# Patient Record
Sex: MALE | Race: WHITE | NOT HISPANIC OR LATINO | Employment: OTHER | URBAN - METROPOLITAN AREA
[De-identification: names, ages, dates, MRNs, and addresses within clinical notes are randomized per-mention and may not be internally consistent; named-entity substitution may affect disease eponyms.]

---

## 2017-01-24 ENCOUNTER — PATIENT MESSAGE (OUTPATIENT)
Dept: FAMILY MEDICINE | Facility: CLINIC | Age: 72
End: 2017-01-24

## 2017-01-24 NOTE — TELEPHONE ENCOUNTER
You have to question all referrals for diagnoses to attach.  Ask the patietn.  I assume bph for the urologist.

## 2017-01-26 ENCOUNTER — PATIENT OUTREACH (OUTPATIENT)
Dept: ADMINISTRATIVE | Facility: HOSPITAL | Age: 72
End: 2017-01-26
Payer: MEDICARE

## 2017-01-26 DIAGNOSIS — E78.5 HYPERLIPIDEMIA WITH TARGET LDL LESS THAN 130: Primary | ICD-10-CM

## 2017-01-26 DIAGNOSIS — I10 ESSENTIAL HYPERTENSION: ICD-10-CM

## 2017-01-26 NOTE — LETTER
January 26, 2017    Alexander Patel  [address] [facility] [address]           [facility]  [address]  [facility] [address]  Phone: [phone] Dear Mr. Patel:    [facility] is committed to your overall health.  To help you get the most out of each of your visits, we will review your information to make sure you are up to date on all of your recommended tests and/or procedures.      Avinash Lamb MD has found that you may be due for   Health Maintenance Due   Topic    Zoster Vaccine     Lipid Panel         If you have had any of the above done at another facility, please bring the records or information with you so that your record at [facility] will be complete.    If you are currently taking medication, please bring it with you to your appointment for review.    We will be happy to assist you with scheduling any necessary appointments or you may contact the [facility] appointment desk at [phone] to schedule at your convenience.     Thank you for choosing [facility] for your healthcare needs,        If you have any questions or concerns, please don't hesitate to call.    Sincerely,    Annette IRIZARRY LPN  Care Coordination Department  [facility]

## 2017-01-27 ENCOUNTER — OFFICE VISIT (OUTPATIENT)
Dept: FAMILY MEDICINE | Facility: CLINIC | Age: 72
End: 2017-01-27
Payer: MEDICARE

## 2017-01-27 VITALS
HEIGHT: 65 IN | SYSTOLIC BLOOD PRESSURE: 138 MMHG | BODY MASS INDEX: 28.23 KG/M2 | HEART RATE: 43 BPM | TEMPERATURE: 98 F | DIASTOLIC BLOOD PRESSURE: 76 MMHG | WEIGHT: 169.44 LBS

## 2017-01-27 DIAGNOSIS — J98.01 BRONCHOSPASM: ICD-10-CM

## 2017-01-27 DIAGNOSIS — J40 BRONCHITIS: Primary | ICD-10-CM

## 2017-01-27 PROCEDURE — 3078F DIAST BP <80 MM HG: CPT | Mod: S$GLB,,, | Performed by: FAMILY MEDICINE

## 2017-01-27 PROCEDURE — 1157F ADVNC CARE PLAN IN RCRD: CPT | Mod: S$GLB,,, | Performed by: FAMILY MEDICINE

## 2017-01-27 PROCEDURE — 96372 THER/PROPH/DIAG INJ SC/IM: CPT | Mod: S$GLB,,, | Performed by: FAMILY MEDICINE

## 2017-01-27 PROCEDURE — 1159F MED LIST DOCD IN RCRD: CPT | Mod: S$GLB,,, | Performed by: FAMILY MEDICINE

## 2017-01-27 PROCEDURE — 1126F AMNT PAIN NOTED NONE PRSNT: CPT | Mod: S$GLB,,, | Performed by: FAMILY MEDICINE

## 2017-01-27 PROCEDURE — 1160F RVW MEDS BY RX/DR IN RCRD: CPT | Mod: S$GLB,,, | Performed by: FAMILY MEDICINE

## 2017-01-27 PROCEDURE — 3075F SYST BP GE 130 - 139MM HG: CPT | Mod: S$GLB,,, | Performed by: FAMILY MEDICINE

## 2017-01-27 PROCEDURE — 99999 PR PBB SHADOW E&M-EST. PATIENT-LVL III: CPT | Mod: PBBFAC,,, | Performed by: FAMILY MEDICINE

## 2017-01-27 PROCEDURE — 99213 OFFICE O/P EST LOW 20 MIN: CPT | Mod: 25,S$GLB,, | Performed by: FAMILY MEDICINE

## 2017-01-27 RX ORDER — DEXAMETHASONE SODIUM PHOSPHATE 4 MG/ML
8 INJECTION, SOLUTION INTRA-ARTICULAR; INTRALESIONAL; INTRAMUSCULAR; INTRAVENOUS; SOFT TISSUE
Status: COMPLETED | OUTPATIENT
Start: 2017-01-27 | End: 2017-01-27

## 2017-01-27 RX ORDER — DOXYCYCLINE 100 MG/1
100 CAPSULE ORAL EVERY 12 HOURS
Qty: 14 CAPSULE | Refills: 0 | Status: SHIPPED | OUTPATIENT
Start: 2017-01-27 | End: 2017-03-31

## 2017-01-27 RX ORDER — ALBUTEROL SULFATE 90 UG/1
2 AEROSOL, METERED RESPIRATORY (INHALATION) EVERY 6 HOURS PRN
Qty: 18 G | Refills: 1 | Status: SHIPPED | OUTPATIENT
Start: 2017-01-27 | End: 2017-07-24

## 2017-01-27 RX ADMIN — DEXAMETHASONE SODIUM PHOSPHATE 8 MG: 4 INJECTION, SOLUTION INTRA-ARTICULAR; INTRALESIONAL; INTRAMUSCULAR; INTRAVENOUS; SOFT TISSUE at 10:01

## 2017-01-27 NOTE — PROGRESS NOTES
Alexander Patel presents with moderate upper respiratory congestion,rhinnorhea,moderate cough past 2-3 days. OTC help slightly. Denies nausea,vomiting,diarrhea or significant fever.    Past Medical History   Diagnosis Date    BPH (benign prostatic hyperplasia)     Essential hypertension 8/28/2015    Gout 2012    Hyperlipidemia LDL goal < 130      Past Surgical History   Procedure Laterality Date    Fracture surgery      Colonoscopy N/A 10/8/2015     Procedure: COLONOSCOPY;  Surgeon: Avinash Lamb MD;  Location: [facility];  Service: Endoscopy;  Laterality: N/A;     Review of patient's allergies indicates:   Allergen Reactions    No known drug allergies      Current Outpatient Prescriptions on File Prior to Visit   Medication Sig Dispense Refill    aspirin 81 MG chewable tablet Every day      GLUCOSAMINE HCL/CHONDR LAM A NA (OSTEO BI-FLEX ORAL) Take 1 tablet by mouth once daily.       lisinopril (PRINIVIL,ZESTRIL) 5 MG tablet Take 1 tablet (5 mg total) by mouth once daily. 90 tablet 3    multivitamin capsule Take 1 capsule by mouth once daily.      tamsulosin (FLOMAX) 0.4 mg Cp24 Take 0.4 mg by mouth once daily.       No current facility-administered medications on file prior to visit.      Social History     Social History    Marital status:      Spouse name: Modesta    Number of children: 2    Years of education: N/A     Occupational History    retired      Social History Main Topics    Smoking status: Never Smoker    Smokeless tobacco: Not on file    Alcohol use Yes      Comment: occasional    Drug use: No    Sexual activity: Not on file     Other Topics Concern    Not on file     Social History Narrative     Family History   Problem Relation Age of Onset    Heart disease Mother     Alcohol abuse Father     Emphysema Father          ROS:  SKIN: No rashes, itching or changes in color or texture of skin.  EYES: Visual acuity fine. No photophobia, ocular pain or diplopia.EARS: Denies  ear pain, discharge or vertigo.NOSE: No loss of smell, no epistaxis some postnasal drip.MOUTH & THROAT: No hoarseness or change in voice. No excessive gum bleeding.CHEST: Denies URENA, cyanosis, wheezing  CARDIOVASCULAR: Denies chest pain, PND, orthopnea or reduced exercise tolerance.  ABDOMEN:  No weight loss.No abdominal pain, no hematemesis or blood in stool.  URINARY: No flank pain, dysuria or hematuria.  PERIPHERAL VASCULAR: No claudication or cyanosis.  MUSCULOSKELETAL: Negative   NEUROLOGIC: No history of seizures, paralysis, alteration of gait or coordination.    PE: Vital signs as noted  Heent:Normocephalic with no recent cranial trauma,PERRLA,EOMI,conjunctiva clear,fundi reveal no hemmorhage exudate or papilledema.Otic canals clear, tympanic membranes slightly dull bilaterally.Nasal mucosa slightly red and edematous.Posterior pharynx slightly red but without exudate.  Neck:Supple with minimal anterior cervical adenopathy.  Chest:Clear bilateral breath sounds with mild scattered ronchi  Heart:Regular rhthym without murmer  Abdomen:Soft, non tender,no masses, no hepatosplenomegalyExtremeties and Neurologic:Grossly within normal limits  Impression: Bronchitis w bronchospasm   Plan: Dea8im, Doxyxcycline 100

## 2017-02-06 ENCOUNTER — LAB VISIT (OUTPATIENT)
Dept: LAB | Facility: HOSPITAL | Age: 72
End: 2017-02-06
Attending: FAMILY MEDICINE
Payer: MEDICARE

## 2017-02-06 ENCOUNTER — OFFICE VISIT (OUTPATIENT)
Dept: FAMILY MEDICINE | Facility: CLINIC | Age: 72
End: 2017-02-06
Payer: MEDICARE

## 2017-02-06 ENCOUNTER — TELEPHONE (OUTPATIENT)
Dept: CARDIOLOGY | Facility: CLINIC | Age: 72
End: 2017-02-06

## 2017-02-06 VITALS
HEART RATE: 50 BPM | SYSTOLIC BLOOD PRESSURE: 156 MMHG | TEMPERATURE: 98 F | BODY MASS INDEX: 27.1 KG/M2 | HEIGHT: 65 IN | DIASTOLIC BLOOD PRESSURE: 79 MMHG | WEIGHT: 162.69 LBS | OXYGEN SATURATION: 98 %

## 2017-02-06 DIAGNOSIS — N40.0 BENIGN PROSTATIC HYPERPLASIA, PRESENCE OF LOWER URINARY TRACT SYMPTOMS UNSPECIFIED, UNSPECIFIED MORPHOLOGY: ICD-10-CM

## 2017-02-06 DIAGNOSIS — I10 ESSENTIAL HYPERTENSION: Primary | ICD-10-CM

## 2017-02-06 DIAGNOSIS — E78.5 HYPERLIPIDEMIA WITH TARGET LDL LESS THAN 130: ICD-10-CM

## 2017-02-06 DIAGNOSIS — I49.5 SSS (SICK SINUS SYNDROME): ICD-10-CM

## 2017-02-06 LAB
ALBUMIN SERPL BCP-MCNC: 3.9 G/DL
ALP SERPL-CCNC: 72 U/L
ALT SERPL W/O P-5'-P-CCNC: 17 U/L
ANION GAP SERPL CALC-SCNC: 4 MMOL/L
AST SERPL-CCNC: 22 U/L
BILIRUB SERPL-MCNC: 0.6 MG/DL
BUN SERPL-MCNC: 20 MG/DL
CALCIUM SERPL-MCNC: 9.4 MG/DL
CHLORIDE SERPL-SCNC: 105 MMOL/L
CHOLEST/HDLC SERPL: 3.7 {RATIO}
CO2 SERPL-SCNC: 29 MMOL/L
CREAT SERPL-MCNC: 1.2 MG/DL
EST. GFR  (AFRICAN AMERICAN): >60 ML/MIN/1.73 M^2
EST. GFR  (NON AFRICAN AMERICAN): 59.6 ML/MIN/1.73 M^2
GLUCOSE SERPL-MCNC: 97 MG/DL
HDL/CHOLESTEROL RATIO: 26.8 %
HDLC SERPL-MCNC: 190 MG/DL
HDLC SERPL-MCNC: 51 MG/DL
LDLC SERPL CALC-MCNC: 124 MG/DL
NONHDLC SERPL-MCNC: 139 MG/DL
POTASSIUM SERPL-SCNC: 5.1 MMOL/L
PROT SERPL-MCNC: 7.1 G/DL
SODIUM SERPL-SCNC: 138 MMOL/L
TRIGL SERPL-MCNC: 75 MG/DL

## 2017-02-06 PROCEDURE — 3078F DIAST BP <80 MM HG: CPT | Mod: S$GLB,,, | Performed by: FAMILY MEDICINE

## 2017-02-06 PROCEDURE — 80061 LIPID PANEL: CPT

## 2017-02-06 PROCEDURE — 99499 UNLISTED E&M SERVICE: CPT | Mod: S$GLB,,, | Performed by: FAMILY MEDICINE

## 2017-02-06 PROCEDURE — 1160F RVW MEDS BY RX/DR IN RCRD: CPT | Mod: S$GLB,,, | Performed by: FAMILY MEDICINE

## 2017-02-06 PROCEDURE — 3077F SYST BP >= 140 MM HG: CPT | Mod: S$GLB,,, | Performed by: FAMILY MEDICINE

## 2017-02-06 PROCEDURE — 1157F ADVNC CARE PLAN IN RCRD: CPT | Mod: S$GLB,,, | Performed by: FAMILY MEDICINE

## 2017-02-06 PROCEDURE — 80053 COMPREHEN METABOLIC PANEL: CPT

## 2017-02-06 PROCEDURE — 36415 COLL VENOUS BLD VENIPUNCTURE: CPT | Mod: PO

## 2017-02-06 PROCEDURE — 99214 OFFICE O/P EST MOD 30 MIN: CPT | Mod: S$GLB,,, | Performed by: FAMILY MEDICINE

## 2017-02-06 PROCEDURE — 1159F MED LIST DOCD IN RCRD: CPT | Mod: S$GLB,,, | Performed by: FAMILY MEDICINE

## 2017-02-06 PROCEDURE — 99999 PR PBB SHADOW E&M-EST. PATIENT-LVL III: CPT | Mod: PBBFAC,,, | Performed by: FAMILY MEDICINE

## 2017-02-06 RX ORDER — LISINOPRIL 10 MG/1
10 TABLET ORAL EVERY OTHER DAY
Qty: 45 TABLET | Refills: 3 | Status: SHIPPED | OUTPATIENT
Start: 2017-02-06 | End: 2017-10-17 | Stop reason: SDUPTHER

## 2017-02-06 RX ORDER — TAMSULOSIN HYDROCHLORIDE 0.4 MG/1
0.4 CAPSULE ORAL EVERY OTHER DAY
Qty: 45 CAPSULE | Refills: 3 | Status: SHIPPED | OUTPATIENT
Start: 2017-02-06 | End: 2017-10-17 | Stop reason: SDUPTHER

## 2017-02-06 NOTE — TELEPHONE ENCOUNTER
Call placed to schedule patient for follow up with Dr. Stern per referral from Dr. Lamb. Per patient wife, referral was placed for annual follow up with Dr. Stern in December 2017. Wife states there insurance requires them to have one for specialty visit. Referral deferred to later date.

## 2017-02-06 NOTE — PROGRESS NOTES
"Subjective:      Patient ID: Alexander Patel is a 72 y.o. male.    Chief Complaint: review chronic problems.   HPI  The patient presents with essential hypertension.  The patient is tolerating the medication well and is in excellent compliance.  The patient is experiencing no side effects.  Counseling was offered regarding low salt diets.  The patient has a reduced salt intake.  The patient denies chest pain, palpitations, shortness of breath, dyspnea on exertion, left or murmur neck pain, nausea, vomiting, diaphoresis, paroxysmal nocturnal dyspnea, and orthopnea.     Visit Vitals    BP (!) 156/79    Pulse (!) 50    Temp 97.6 °F (36.4 °C) (Oral)    Ht 5' 5" (1.651 m)    Wt 73.8 kg (162 lb 11.2 oz)    SpO2 98%    BMI 27.07 kg/m2     The patient presents with hyperlipidemia.  The patient reports tolerating the medication well and is in excellent compliance.  There have been no medication side effects.  The patient denies chest pain, neuropathy, and myalgias.  The patient has reduced fat intake and has been exercising.  Current treatment has included the medications listed in the med card.    Lab Results   Component Value Date    CHOL 192 08/24/2015    CHOL 182 05/23/2014    CHOL 209 (H) 05/11/2012       Lab Results   Component Value Date    HDL 42 08/24/2015    HDL 49 05/23/2014    HDL 44 05/11/2012       Lab Results   Component Value Date    LDLCALC 114.2 08/24/2015    LDLCALC 114.8 05/23/2014    LDLCALC 141.0 05/11/2012       Lab Results   Component Value Date    TRIG 179 (H) 08/24/2015    TRIG 91 05/23/2014    TRIG 120 05/11/2012       Lab Results   Component Value Date    CHOLHDL 21.9 08/24/2015    CHOLHDL 26.9 05/23/2014    CHOLHDL 21.1 05/11/2012     Lab Results   Component Value Date    ALT 17 08/24/2015    AST 21 08/24/2015    ALKPHOS 66 08/24/2015    BILITOT 0.5 08/24/2015       He has bph and he is not on medicine for this at this time.      He has been seen by the cardioloigst for his SSS.  He has not " had cp or SOB.    Health Maintenance Due   Topic Date Due    Zoster Vaccine  02/02/2004    Lipid Panel  08/24/2016       Past Medical History:  Past Medical History   Diagnosis Date    BPH (benign prostatic hyperplasia)     Essential hypertension 8/28/2015    Gout 2012    Hyperlipidemia LDL goal < 130      Past Surgical History   Procedure Laterality Date    Fracture surgery      Colonoscopy N/A 10/8/2015     Procedure: COLONOSCOPY;  Surgeon: Avinash Lamb MD;  Location: [facility];  Service: Endoscopy;  Laterality: N/A;     Review of patient's allergies indicates:   Allergen Reactions    No known drug allergies      Current Outpatient Prescriptions on File Prior to Visit   Medication Sig Dispense Refill    albuterol 90 mcg/actuation inhaler Inhale 2 puffs into the lungs every 6 (six) hours as needed for Wheezing. Rescue 18 g 1    aspirin 81 MG chewable tablet Every day      doxycycline (VIBRAMYCIN) 100 MG Cap Take 1 capsule (100 mg total) by mouth every 12 (twelve) hours. 14 capsule 0    GLUCOSAMINE HCL/CHONDR LAM A NA (OSTEO BI-FLEX ORAL) Take 1 tablet by mouth once daily.       lisinopril (PRINIVIL,ZESTRIL) 5 MG tablet Take 1 tablet (5 mg total) by mouth once daily. 90 tablet 3    multivitamin capsule Take 1 capsule by mouth once daily.      tamsulosin (FLOMAX) 0.4 mg Cp24 Take 0.4 mg by mouth once daily.       No current facility-administered medications on file prior to visit.      Social History     Social History    Marital status:      Spouse name: Modesta    Number of children: 2    Years of education: N/A     Occupational History    retired      Social History Main Topics    Smoking status: Never Smoker    Smokeless tobacco: Not on file    Alcohol use Yes      Comment: occasional    Drug use: No    Sexual activity: Not on file     Other Topics Concern    Not on file     Social History Narrative     Family History   Problem Relation Age of Onset    Heart disease Mother      "Alcohol abuse Father     Emphysema Father            Review of Systems   Constitutional: Negative.  Negative for chills, diaphoresis and fever.   HENT: Negative for congestion, hearing loss, mouth sores, postnasal drip and sore throat.    Eyes: Negative for pain and visual disturbance.   Respiratory: Negative for cough, chest tightness, shortness of breath and wheezing.    Cardiovascular: Negative for chest pain.   Gastrointestinal: Negative for abdominal pain, anal bleeding, blood in stool, constipation, diarrhea, nausea and vomiting.   Genitourinary: Negative for dysuria and hematuria.   Musculoskeletal: Negative for back pain, neck pain and neck stiffness.   Skin: Negative for rash.   Neurological: Negative for dizziness and weakness.       Objective:     Visit Vitals    BP (!) 156/79    Pulse (!) 50    Temp 97.6 °F (36.4 °C) (Oral)    Ht 5' 5" (1.651 m)    Wt 73.8 kg (162 lb 11.2 oz)    SpO2 98%    BMI 27.07 kg/m2       Physical Exam   Constitutional: He is oriented to person, place, and time. He appears well-developed and well-nourished.   HENT:   Head: Normocephalic and atraumatic.   Right Ear: External ear normal.   Left Ear: External ear normal.   Nose: Nose normal.   Mouth/Throat: Oropharynx is clear and moist. No oropharyngeal exudate.   Eyes: Conjunctivae and EOM are normal. Pupils are equal, round, and reactive to light. Right eye exhibits no discharge. Left eye exhibits no discharge. No scleral icterus.   Neck: Normal range of motion. Neck supple. No JVD present. No thyromegaly present.   Cardiovascular: Normal rate, regular rhythm, normal heart sounds and intact distal pulses.  Exam reveals no gallop and no friction rub.    No murmur heard.  Pulmonary/Chest: Effort normal and breath sounds normal. No respiratory distress. He has no wheezes. He has no rales. He exhibits no tenderness.   Abdominal: Soft. Bowel sounds are normal. He exhibits no distension and no mass. There is no tenderness. There " is no rebound and no guarding.   Genitourinary: Rectal exam shows no mass and no tenderness. Prostate is enlarged. Prostate is not tender.   Musculoskeletal: Normal range of motion. He exhibits no edema or tenderness.   Lymphadenopathy:     He has no cervical adenopathy.   Neurological: He is alert and oriented to person, place, and time. No cranial nerve deficit. Coordination normal.   Skin: Skin is warm and dry. He is not diaphoretic.   Psychiatric: He has a normal mood and affect.       Assessment:     1. Essential hypertension    2. SSS (sick sinus syndrome)    3. Hyperlipidemia with target LDL less than 130    4. Benign prostatic hyperplasia, presence of lower urinary tract symptoms unspecified, unspecified morphology        Plan:   Diagnoses and all orders for this visit:    Essential hypertension  -     lisinopril 10 MG tablet; Take 1 tablet (10 mg total) by mouth every other day.    SSS (sick sinus syndrome)  -     Ambulatory referral to Cardiology    Hyperlipidemia with target LDL less than 130  -     Comprehensive metabolic panel; Future  -     Lipid panel; Future    Benign prostatic hyperplasia, presence of lower urinary tract symptoms unspecified, unspecified morphology  -     tamsulosin (FLOMAX) 0.4 mg Cp24; Take 1 capsule (0.4 mg total) by mouth every other day.      rtc in 1 month for a bp check.

## 2017-02-06 NOTE — MR AVS SNAPSHOT
[facility]  [address] [facility] [facility] [address]  Phone: [phone]  Fax: [phone]                  Alexander Patel   2017 11:00 AM   Office Visit    Description:  Male : 1945   Provider:  Avinash Lamb MD   Department:  [facility]           Diagnoses this Visit        Comments    Essential hypertension    -  Primary     SSS (sick sinus syndrome)         Hyperlipidemia with target LDL less than 130         Benign prostatic hyperplasia, presence of lower urinary tract symptoms unspecified, unspecified morphology                To Do List           Future Appointments        Provider Department Dept Phone    3/13/2017 8:00 AM Avinash Lamb MD [facility] [phone]    3/24/2017 9:30 AM Ryan Stern MD [facility] [phone]      Goals (5 Years of Data)     None      Follow-Up and Disposition     Return in about 4 weeks (around 3/6/2017) for blood pressure check.    Follow-up and Disposition History       These Medications        Disp Refills Start End    lisinopril 10 MG tablet 45 tablet 3 2017    Take 1 tablet (10 mg total) by mouth every other day. - Oral    Pharmacy: [facility] - [facility] [address] Ph #: [phone]       tamsulosin (FLOMAX) 0.4 mg Cp24 45 capsule 3 2017     Take 1 capsule (0.4 mg total) by mouth every other day. - Oral    Pharmacy: [facility] - [facility] [address] Ph #: [phone]         [facility]     [facility] On [facility] Nurse Care Line -  Assistance  Registered nurses in the [facility] provide clinical advisement, health education, appointment booking, and other advisory services.  Call for this free service at [phone].             Medications           Message regarding Medications     Verify the changes and/or additions to your medication regime listed below are the same as discussed  "with your clinician today.  If any of these changes or additions are incorrect, please notify your healthcare provider.        START taking these NEW medications        Refills    lisinopril 10 MG tablet 3    Sig: Take 1 tablet (10 mg total) by mouth every other day.    Class: Normal    Route: Oral    tamsulosin (FLOMAX) 0.4 mg Cp24 3    Sig: Take 1 capsule (0.4 mg total) by mouth every other day.    Class: Normal    Route: Oral           Verify that the below list of medications is an accurate representation of the medications you are currently taking.  If none reported, the list may be blank. If incorrect, please contact your healthcare provider. Carry this list with you in case of emergency.           Current Medications     albuterol 90 mcg/actuation inhaler Inhale 2 puffs into the lungs every 6 (six) hours as needed for Wheezing. Rescue    aspirin 81 MG chewable tablet Every day    doxycycline (VIBRAMYCIN) 100 MG Cap Take 1 capsule (100 mg total) by mouth every 12 (twelve) hours.    GLUCOSAMINE HCL/CHONDR LAM A NA (OSTEO BI-FLEX ORAL) Take 1 tablet by mouth once daily.     multivitamin capsule Take 1 capsule by mouth once daily.    lisinopril 10 MG tablet Take 1 tablet (10 mg total) by mouth every other day.    tamsulosin (FLOMAX) 0.4 mg Cp24 Take 1 capsule (0.4 mg total) by mouth every other day.           Clinical Reference Information           Your Vitals Were     BP Pulse Temp Height Weight SpO2    156/79 50 97.6 °F (36.4 °C) (Oral) 5' 5" (1.651 m) 73.8 kg (162 lb 11.2 oz) 98%    BMI                27.07 kg/m2          Blood Pressure          Most Recent Value    BP  (!)  156/79      Allergies as of 2/6/2017     No Known Drug Allergies      Immunizations Administered on Date of Encounter - 2/6/2017     None      Orders Placed During Today's Visit      Normal Orders This Visit    Ambulatory referral to Cardiology     Future Labs/Procedures Expected by Expires    Comprehensive metabolic panel  2/6/2017 " (Approximate) 2/6/2018    Lipid panel  2/6/2017 (Approximate) 2/6/2018      Language Assistance Services     ATTENTION: Language assistance services are available, free of charge. Please call [phone].      ATENCIÓN: Si habla abdoulaye, tiene a persaud disposición servicios gratuitos de asistencia lingüística. Llame al [phone].     CHÚ Ý: N?u b?n nói Ti?ng Vi?t, có các d?ch v? h? tr? ngôn ng? mi?n phí dành cho b?n. G?i s? [phone].         [facility] complies with applicable Federal civil rights laws and does not discriminate on the basis of race, color, national origin, age, disability, or sex.

## 2017-03-31 ENCOUNTER — OFFICE VISIT (OUTPATIENT)
Dept: FAMILY MEDICINE | Facility: CLINIC | Age: 72
End: 2017-03-31
Payer: MEDICARE

## 2017-03-31 VITALS — WEIGHT: 163.69 LBS | HEIGHT: 64 IN | BODY MASS INDEX: 27.95 KG/M2

## 2017-03-31 DIAGNOSIS — I10 ESSENTIAL HYPERTENSION: Primary | ICD-10-CM

## 2017-03-31 PROCEDURE — 99999 PR PBB SHADOW E&M-EST. PATIENT-LVL II: CPT | Mod: PBBFAC,,, | Performed by: FAMILY MEDICINE

## 2017-03-31 PROCEDURE — 99213 OFFICE O/P EST LOW 20 MIN: CPT | Mod: S$GLB,,, | Performed by: FAMILY MEDICINE

## 2017-03-31 PROCEDURE — 99499 UNLISTED E&M SERVICE: CPT | Mod: S$GLB,,, | Performed by: FAMILY MEDICINE

## 2017-03-31 PROCEDURE — 1126F AMNT PAIN NOTED NONE PRSNT: CPT | Mod: S$GLB,,, | Performed by: FAMILY MEDICINE

## 2017-03-31 PROCEDURE — 1157F ADVNC CARE PLAN IN RCRD: CPT | Mod: S$GLB,,, | Performed by: FAMILY MEDICINE

## 2017-03-31 PROCEDURE — 1160F RVW MEDS BY RX/DR IN RCRD: CPT | Mod: S$GLB,,, | Performed by: FAMILY MEDICINE

## 2017-03-31 PROCEDURE — 1159F MED LIST DOCD IN RCRD: CPT | Mod: S$GLB,,, | Performed by: FAMILY MEDICINE

## 2017-03-31 NOTE — PROGRESS NOTES
"Subjective:   Alexander Patel is a 72 y.o. male with hypertension.  He is tolerating the medication well and is in excellent compliance.  The patient is experiencing no side effects.  Counseling was offered regarding low salt diets.  He has a reduced salt intake.  He denies chest pain, palpitations, shortness of breath, dyspnea on exertion, left or murmur neck pain, nausea, vomiting, diaphoresis, paroxysmal nocturnal dyspnea, and orthopnea.   The patient's Health Maintenance was reviewed and the following appears to be due at this time:  Health Maintenance Due   Topic Date Due    Zoster Vaccine  02/02/2004       Outpatient Medications Prior to Visit   Medication Sig Dispense Refill    albuterol 90 mcg/actuation inhaler Inhale 2 puffs into the lungs every 6 (six) hours as needed for Wheezing. Rescue 18 g 1    aspirin 81 MG chewable tablet Every day      GLUCOSAMINE HCL/CHONDR LAM A NA (OSTEO BI-FLEX ORAL) Take 1 tablet by mouth once daily.       lisinopril 10 MG tablet Take 1 tablet (10 mg total) by mouth every other day. 45 tablet 3    multivitamin capsule Take 1 capsule by mouth once daily.      tamsulosin (FLOMAX) 0.4 mg Cp24 Take 1 capsule (0.4 mg total) by mouth every other day. 45 capsule 3    doxycycline (VIBRAMYCIN) 100 MG Cap Take 1 capsule (100 mg total) by mouth every 12 (twelve) hours. 14 capsule 0     No facility-administered medications prior to visit.         Objective:   Ht 5' 4" (1.626 m)  Wt 74.2 kg (163 lb 11.1 oz)  BMI 28.1 kg/m2   Body mass index is 28.1 kg/(m^2).  Genl:Alert and oriented in NAD.   Cardiovascular: Normal rate, regular rhythm, S1 normal, S2 normal and normal heart sounds.  Exam reveals no gallop, no S3, no S4 and no friction rub.    No murmur heard.  Pulmonary/Chest: Effort normal and breath sounds normal. No stridor. Not tachypneic. No respiratory distress. The patient has no wheezes. The patient has no rhonchi. The patient has no rales.   Skin: The patient is not " diaphoretic.     Encounter Diagnosis   Name Primary?    Essential hypertension Yes       PLAN:    I have discontinued Mr. Patle's doxycycline. I am also having him maintain his aspirin, GLUCOSAMINE HCL/CHONDR LAM A NA (OSTEO BI-FLEX ORAL), multivitamin, albuterol, lisinopril, and tamsulosin.    Alexander was seen today for follow-up.    Diagnoses and all orders for this visit:    Essential hypertension         No orders of the defined types were placed in this encounter.    Use a low salt diet.   Exercise and diet enough to keep the BMI less than 30.

## 2017-04-21 ENCOUNTER — TELEPHONE (OUTPATIENT)
Dept: FAMILY MEDICINE | Facility: CLINIC | Age: 72
End: 2017-04-21

## 2017-04-21 DIAGNOSIS — M79.643 PAIN OF HAND, UNSPECIFIED LATERALITY: Primary | ICD-10-CM

## 2017-04-23 NOTE — TELEPHONE ENCOUNTER
I have signed for the following orders AND/OR meds.  Please call the patient and ask the patient to schedule the testing AND/OR inform about any medications that were sent.      Orders Placed This Encounter   Procedures    Ambulatory referral to Orthopedics     Referral Priority:   Routine     Referral Type:   Consultation     Referred to Provider:   Zain Spann Jr., MD     Requested Specialty:   Orthopedic Surgery     Number of Visits Requested:   1

## 2017-07-11 ENCOUNTER — OFFICE VISIT (OUTPATIENT)
Dept: FAMILY MEDICINE | Facility: CLINIC | Age: 72
End: 2017-07-11
Payer: MEDICARE

## 2017-07-11 ENCOUNTER — LAB VISIT (OUTPATIENT)
Dept: LAB | Facility: HOSPITAL | Age: 72
End: 2017-07-11
Attending: NURSE PRACTITIONER
Payer: MEDICARE

## 2017-07-11 VITALS
HEART RATE: 46 BPM | SYSTOLIC BLOOD PRESSURE: 120 MMHG | WEIGHT: 164.81 LBS | DIASTOLIC BLOOD PRESSURE: 64 MMHG | TEMPERATURE: 98 F | BODY MASS INDEX: 27.46 KG/M2 | HEIGHT: 65 IN

## 2017-07-11 DIAGNOSIS — E86.0 DEHYDRATION: ICD-10-CM

## 2017-07-11 DIAGNOSIS — I95.9 HYPOTENSION, UNSPECIFIED HYPOTENSION TYPE: Primary | ICD-10-CM

## 2017-07-11 DIAGNOSIS — R00.1 BRADYCARDIA: ICD-10-CM

## 2017-07-11 DIAGNOSIS — I49.5 SSS (SICK SINUS SYNDROME): ICD-10-CM

## 2017-07-11 DIAGNOSIS — I95.9 HYPOTENSION, UNSPECIFIED HYPOTENSION TYPE: ICD-10-CM

## 2017-07-11 LAB
ANION GAP SERPL CALC-SCNC: 6 MMOL/L
BUN SERPL-MCNC: 29 MG/DL
CALCIUM SERPL-MCNC: 9.4 MG/DL
CHLORIDE SERPL-SCNC: 106 MMOL/L
CO2 SERPL-SCNC: 27 MMOL/L
CREAT SERPL-MCNC: 1.9 MG/DL
EST. GFR  (AFRICAN AMERICAN): 39.6 ML/MIN/1.73 M^2
EST. GFR  (NON AFRICAN AMERICAN): 34.2 ML/MIN/1.73 M^2
GLUCOSE SERPL-MCNC: 107 MG/DL
POTASSIUM SERPL-SCNC: 5.6 MMOL/L
SODIUM SERPL-SCNC: 139 MMOL/L

## 2017-07-11 PROCEDURE — 1126F AMNT PAIN NOTED NONE PRSNT: CPT | Mod: S$GLB,,, | Performed by: NURSE PRACTITIONER

## 2017-07-11 PROCEDURE — 99999 PR PBB SHADOW E&M-EST. PATIENT-LVL IV: CPT | Mod: PBBFAC,,, | Performed by: NURSE PRACTITIONER

## 2017-07-11 PROCEDURE — 80048 BASIC METABOLIC PNL TOTAL CA: CPT

## 2017-07-11 PROCEDURE — 99499 UNLISTED E&M SERVICE: CPT | Mod: S$GLB,,, | Performed by: NURSE PRACTITIONER

## 2017-07-11 PROCEDURE — 36415 COLL VENOUS BLD VENIPUNCTURE: CPT | Mod: PO

## 2017-07-11 PROCEDURE — 1159F MED LIST DOCD IN RCRD: CPT | Mod: S$GLB,,, | Performed by: NURSE PRACTITIONER

## 2017-07-11 PROCEDURE — 99213 OFFICE O/P EST LOW 20 MIN: CPT | Mod: S$GLB,,, | Performed by: NURSE PRACTITIONER

## 2017-07-11 NOTE — PROGRESS NOTES
Subjective:       Patient ID: Alexander Patel is a 72 y.o. male.    Chief Complaint: Hypotension  Pt in today for hypotension. His wife states that his BP was 60s/40s yesterday after doing yard work at about 1 in the afternoon. She also states that his pulse was in the 70s, which is high for him; his pulse usually runs in the 40-50s.Pt states that he did not hydrate while working outside. He states that his BP increased to 90s/60s, then to 110s/60s after hydrating. He does have sick sinus syndrome, bradycardia. He sees cardiology yearly; last appt 12/2016; requests referral. His wife states that his PCP increased his BP medication in April and states BPs have been WNL since the adjustment. BP WNL today. Pt denies any weakness, HA, fatigue, CP, SOB, dizziness. Pt has no other complaints today.   Past Medical History:   Diagnosis Date    BPH (benign prostatic hyperplasia)     Essential hypertension 8/28/2015    Gout 2012    Hyperlipidemia LDL goal < 130      Social History     Social History    Marital status:      Spouse name: Modesta    Number of children: 2    Years of education: N/A     Occupational History    retired      Social History Main Topics    Smoking status: Never Smoker    Smokeless tobacco: Not on file    Alcohol use Yes      Comment: occasional    Drug use: No    Sexual activity: Not on file     Past Surgical History:   Procedure Laterality Date    COLONOSCOPY N/A 10/8/2015    Procedure: COLONOSCOPY;  Surgeon: Avinash Lamb MD;  [facility]: [facility];  Service: Endoscopy;  Laterality: N/A;    FRACTURE SURGERY         HPI  Review of Systems   Constitutional: Negative.    HENT: Negative.    Eyes: Negative.    Respiratory: Negative.    Cardiovascular: Negative.    Gastrointestinal: Negative.    Endocrine: Negative.    Genitourinary: Negative.    Musculoskeletal: Negative.    Skin: Negative.    Allergic/Immunologic: Negative.    Neurological: Negative.    Psychiatric/Behavioral:  Negative.        Objective:      Physical Exam   Constitutional: He is oriented to person, place, and time. He appears well-developed and well-nourished.   HENT:   Head: Normocephalic.   Right Ear: External ear normal.   Left Ear: External ear normal.   Nose: Nose normal.   Mouth/Throat: Oropharynx is clear and moist.   Eyes: Conjunctivae are normal. Pupils are equal, round, and reactive to light.   Neck: Normal range of motion. Neck supple.   Cardiovascular: Normal rate, regular rhythm and normal heart sounds.    Pulmonary/Chest: Effort normal and breath sounds normal.   Abdominal: Soft. Bowel sounds are normal.   Musculoskeletal: Normal range of motion.   Neurological: He is alert and oriented to person, place, and time.   Skin: Skin is warm and dry. Capillary refill takes 2 to 3 seconds.   Psychiatric: He has a normal mood and affect. His behavior is normal. Judgment and thought content normal.   Nursing note and vitals reviewed.      Assessment:       1. Hypotension, unspecified hypotension type    2. SSS (sick sinus syndrome)    3. Bradycardia    4. Dehydration        Plan:           Alexander was seen today for hypotension.    Diagnoses and all orders for this visit:    Hypotension, unspecified hypotension type  SSS (sick sinus syndrome)  Bradycardia  -     Ambulatory referral to Cardiology  -     Basic metabolic panel; Future  Hydrate well  Avoid working in heat   Report to ER immediately if symptoms worsen or persist    Dehydration  Comments:  possible  Orders:  -     Basic metabolic panel; Future

## 2017-07-12 ENCOUNTER — TELEPHONE (OUTPATIENT)
Dept: FAMILY MEDICINE | Facility: CLINIC | Age: 72
End: 2017-07-12

## 2017-07-12 DIAGNOSIS — N28.9 FUNCTION KIDNEY DECREASED: Primary | ICD-10-CM

## 2017-07-12 DIAGNOSIS — E87.5 HYPERKALEMIA: ICD-10-CM

## 2017-07-12 NOTE — TELEPHONE ENCOUNTER
----- Message from Eulalia Kirkland NP sent at 7/12/2017 11:34 AM CDT -----  Reviewed; potassium elevated; stop any potassium supplements. Kidney function decreased; may be due to dehydration, however recommend seeing nephrology for further evaluation. Repeat BMP in 1 w.

## 2017-07-19 ENCOUNTER — LAB VISIT (OUTPATIENT)
Dept: LAB | Facility: HOSPITAL | Age: 72
End: 2017-07-19
Attending: FAMILY MEDICINE
Payer: MEDICARE

## 2017-07-19 DIAGNOSIS — E87.5 HYPERKALEMIA: ICD-10-CM

## 2017-07-19 PROBLEM — R94.4 DECREASED GFR: Status: ACTIVE | Noted: 2017-07-19

## 2017-07-19 LAB
ANION GAP SERPL CALC-SCNC: 9 MMOL/L
BUN SERPL-MCNC: 28 MG/DL
CALCIUM SERPL-MCNC: 8.9 MG/DL
CHLORIDE SERPL-SCNC: 107 MMOL/L
CO2 SERPL-SCNC: 23 MMOL/L
CREAT SERPL-MCNC: 1.7 MG/DL
EST. GFR  (AFRICAN AMERICAN): 45.2 ML/MIN/1.73 M^2
EST. GFR  (NON AFRICAN AMERICAN): 39.1 ML/MIN/1.73 M^2
GLUCOSE SERPL-MCNC: 123 MG/DL
POTASSIUM SERPL-SCNC: 3.9 MMOL/L
SODIUM SERPL-SCNC: 139 MMOL/L

## 2017-07-19 PROCEDURE — 80048 BASIC METABOLIC PNL TOTAL CA: CPT

## 2017-07-19 PROCEDURE — 36415 COLL VENOUS BLD VENIPUNCTURE: CPT | Mod: PO

## 2017-07-20 NOTE — PROGRESS NOTES
I am concerned because the glucose is high and the kidney function is low.  I need for a visit to be arranged with a kidney doctor and at the same time, Please arrange for a 2 hour gluocose challenge and an a1c to be done.  Refer to nephrology here in the clinic for the increased creatinine and decreased gfr.  If any anti-inflammatories like ibuprofen are being taken, stop these now.Hydrate well.

## 2017-07-24 ENCOUNTER — PATIENT MESSAGE (OUTPATIENT)
Dept: NEPHROLOGY | Facility: CLINIC | Age: 72
End: 2017-07-24

## 2017-07-24 ENCOUNTER — OFFICE VISIT (OUTPATIENT)
Dept: NEPHROLOGY | Facility: CLINIC | Age: 72
End: 2017-07-24
Payer: MEDICARE

## 2017-07-24 ENCOUNTER — LAB VISIT (OUTPATIENT)
Dept: LAB | Facility: HOSPITAL | Age: 72
End: 2017-07-24
Attending: INTERNAL MEDICINE
Payer: MEDICARE

## 2017-07-24 ENCOUNTER — TELEPHONE (OUTPATIENT)
Dept: FAMILY MEDICINE | Facility: CLINIC | Age: 72
End: 2017-07-24

## 2017-07-24 VITALS
BODY MASS INDEX: 27.32 KG/M2 | HEART RATE: 49 BPM | TEMPERATURE: 98 F | SYSTOLIC BLOOD PRESSURE: 144 MMHG | DIASTOLIC BLOOD PRESSURE: 72 MMHG | OXYGEN SATURATION: 98 % | HEIGHT: 65 IN | WEIGHT: 164 LBS

## 2017-07-24 DIAGNOSIS — N17.9 AKI (ACUTE KIDNEY INJURY): Primary | ICD-10-CM

## 2017-07-24 DIAGNOSIS — R73.09 ELEVATED GLUCOSE LEVEL: Primary | ICD-10-CM

## 2017-07-24 DIAGNOSIS — N17.9 AKI (ACUTE KIDNEY INJURY): ICD-10-CM

## 2017-07-24 LAB
BACTERIA #/AREA URNS HPF: ABNORMAL /HPF
BILIRUB UR QL STRIP: NEGATIVE
CLARITY UR: CLEAR
COLOR UR: YELLOW
GLUCOSE UR QL STRIP: NEGATIVE
HGB UR QL STRIP: NEGATIVE
HYALINE CASTS #/AREA URNS LPF: 3 /LPF
KETONES UR QL STRIP: NEGATIVE
LEUKOCYTE ESTERASE UR QL STRIP: NEGATIVE
MICROSCOPIC COMMENT: ABNORMAL
NITRITE UR QL STRIP: NEGATIVE
PH UR STRIP: 6 [PH] (ref 5–8)
PROT UR QL STRIP: NEGATIVE
RBC #/AREA URNS HPF: 1 /HPF (ref 0–4)
SP GR UR STRIP: 1.01 (ref 1–1.03)
URN SPEC COLLECT METH UR: NORMAL
WBC #/AREA URNS HPF: 1 /HPF (ref 0–5)

## 2017-07-24 PROCEDURE — 1159F MED LIST DOCD IN RCRD: CPT | Mod: S$GLB,,, | Performed by: INTERNAL MEDICINE

## 2017-07-24 PROCEDURE — 99999 PR PBB SHADOW E&M-EST. PATIENT-LVL III: CPT | Mod: PBBFAC,,, | Performed by: INTERNAL MEDICINE

## 2017-07-24 PROCEDURE — 1125F AMNT PAIN NOTED PAIN PRSNT: CPT | Mod: S$GLB,,, | Performed by: INTERNAL MEDICINE

## 2017-07-24 PROCEDURE — 81000 URINALYSIS NONAUTO W/SCOPE: CPT | Mod: PO

## 2017-07-24 PROCEDURE — 99204 OFFICE O/P NEW MOD 45 MIN: CPT | Mod: S$GLB,,, | Performed by: INTERNAL MEDICINE

## 2017-07-24 NOTE — TELEPHONE ENCOUNTER
----- Message from Avinash Lamb MD sent at 7/19/2017 10:57 PM CDT -----  I am concerned because the glucose is high and the kidney function is low.  I need for a visit to be arranged with a kidney doctor and at the same time, Please arrange for a 2 hour gluocose challenge and an a1c to be done.  Refer to nephrology here in the clinic for the increased creatinine and decreased gfr.  If any anti-inflammatories like ibuprofen are being taken, stop these now.Hydrate well.

## 2017-07-24 NOTE — Clinical Note
July 31, 2017      [place]  [address] [address] [place] [address]           [facility] [address] [facility] [address] [place] [address]  Phone: [phone]          Patient: Alexander Patel   MR Number: [id number]   YOB: 1945   Date of Visit: 7/24/2017       Dear Eulalia Kirkland:    Thank you for referring Alexander Patel to me for evaluation. Attached you will find relevant portions of my assessment and plan of care.    If you have questions, please do not hesitate to call me. I look forward to following Alexander Patel along with you.    Sincerely,    Keyshawn Dai MD    Enclosure  CC:  No Recipients    If you would like to receive this communication electronically, please contact [email or web address] or [phone] to request more information on [employer] Link access.    For providers and/or their staff who would like to refer a patient to [facility], please contact us through our one-stop-shop provider referral line, [facility] , at [phone].    If you feel you have received this communication in error or would no longer like to receive these types of communications, please e-mail [email or web address]

## 2017-07-24 NOTE — TELEPHONE ENCOUNTER
I have signed for the following orders AND/OR meds.  Please call the patient and ask the patient to schedule the testing AND/OR inform about any medications that were sent.      Orders Placed This Encounter   Procedures    Glucose Tolerance 2 Hour     Standing Status:   Future     Standing Expiration Date:   9/22/2018    HEMOGLOBIN A1C     Standing Status:   Future     Standing Expiration Date:   9/22/2018

## 2017-07-28 ENCOUNTER — LAB VISIT (OUTPATIENT)
Dept: LAB | Facility: HOSPITAL | Age: 72
End: 2017-07-28
Attending: INTERNAL MEDICINE
Payer: MEDICARE

## 2017-07-28 DIAGNOSIS — N17.9 AKI (ACUTE KIDNEY INJURY): ICD-10-CM

## 2017-07-28 LAB
ANION GAP SERPL CALC-SCNC: 6 MMOL/L
BUN SERPL-MCNC: 23 MG/DL
CALCIUM SERPL-MCNC: 9 MG/DL
CHLORIDE SERPL-SCNC: 104 MMOL/L
CO2 SERPL-SCNC: 25 MMOL/L
CREAT SERPL-MCNC: 1.5 MG/DL
EST. GFR  (AFRICAN AMERICAN): 52.6 ML/MIN/1.73 M^2
EST. GFR  (NON AFRICAN AMERICAN): 45.5 ML/MIN/1.73 M^2
GLUCOSE SERPL-MCNC: 97 MG/DL
POTASSIUM SERPL-SCNC: 4.3 MMOL/L
SODIUM SERPL-SCNC: 135 MMOL/L

## 2017-07-28 PROCEDURE — 80048 BASIC METABOLIC PNL TOTAL CA: CPT

## 2017-07-28 PROCEDURE — 36415 COLL VENOUS BLD VENIPUNCTURE: CPT | Mod: PO

## 2017-07-31 ENCOUNTER — TELEPHONE (OUTPATIENT)
Dept: NEPHROLOGY | Facility: CLINIC | Age: 72
End: 2017-07-31

## 2017-07-31 ENCOUNTER — PATIENT MESSAGE (OUTPATIENT)
Dept: NEPHROLOGY | Facility: CLINIC | Age: 72
End: 2017-07-31

## 2017-07-31 DIAGNOSIS — N18.30 CKD (CHRONIC KIDNEY DISEASE) STAGE 3, GFR 30-59 ML/MIN: Primary | ICD-10-CM

## 2017-07-31 PROBLEM — N17.9 AKI (ACUTE KIDNEY INJURY): Status: ACTIVE | Noted: 2017-07-31

## 2017-07-31 NOTE — PROGRESS NOTES
Subjective:       Patient ID: Alexander Patel is a 72 y.o. White male who presents for new evaluation of ZEINA    HPI  Review of Systems    Objective:      Physical Exam    Assessment:       1. ZEINA (acute kidney injury)        Plan:

## 2017-08-03 ENCOUNTER — LAB VISIT (OUTPATIENT)
Dept: LAB | Facility: HOSPITAL | Age: 72
End: 2017-08-03
Attending: INTERNAL MEDICINE
Payer: MEDICARE

## 2017-08-03 ENCOUNTER — TELEPHONE (OUTPATIENT)
Dept: RADIOLOGY | Facility: HOSPITAL | Age: 72
End: 2017-08-03

## 2017-08-03 DIAGNOSIS — N18.30 CKD (CHRONIC KIDNEY DISEASE) STAGE 3, GFR 30-59 ML/MIN: ICD-10-CM

## 2017-08-03 LAB — URATE SERPL-MCNC: 8.8 MG/DL

## 2017-08-03 PROCEDURE — 87340 HEPATITIS B SURFACE AG IA: CPT

## 2017-08-03 PROCEDURE — 86038 ANTINUCLEAR ANTIBODIES: CPT

## 2017-08-03 PROCEDURE — 36415 COLL VENOUS BLD VENIPUNCTURE: CPT | Mod: PO

## 2017-08-03 PROCEDURE — 86803 HEPATITIS C AB TEST: CPT

## 2017-08-03 PROCEDURE — 86431 RHEUMATOID FACTOR QUANT: CPT

## 2017-08-03 PROCEDURE — 84165 PROTEIN E-PHORESIS SERUM: CPT

## 2017-08-03 PROCEDURE — 84165 PROTEIN E-PHORESIS SERUM: CPT | Mod: 26,,, | Performed by: PATHOLOGY

## 2017-08-03 PROCEDURE — 84550 ASSAY OF BLOOD/URIC ACID: CPT

## 2017-08-04 ENCOUNTER — HOSPITAL ENCOUNTER (OUTPATIENT)
Dept: RADIOLOGY | Facility: HOSPITAL | Age: 72
Discharge: HOME | End: 2017-08-04
Attending: INTERNAL MEDICINE
Payer: MEDICARE

## 2017-08-04 DIAGNOSIS — N18.30 CKD (CHRONIC KIDNEY DISEASE) STAGE 3, GFR 30-59 ML/MIN: ICD-10-CM

## 2017-08-04 LAB
ALBUMIN SERPL ELPH-MCNC: 4.25 G/DL
ALPHA1 GLOB SERPL ELPH-MCNC: 0.25 G/DL
ALPHA2 GLOB SERPL ELPH-MCNC: 0.58 G/DL
ANA SER QL IF: NORMAL
B-GLOBULIN SERPL ELPH-MCNC: 0.58 G/DL
GAMMA GLOB SERPL ELPH-MCNC: 1.03 G/DL
HBV SURFACE AG SERPL QL IA: NEGATIVE
HCV AB SERPL QL IA: NEGATIVE
PATHOLOGIST INTERPRETATION SPE: NORMAL
PROT SERPL-MCNC: 6.7 G/DL
RHEUMATOID FACT SERPL-ACNC: <10 IU/ML

## 2017-08-04 PROCEDURE — 76770 US EXAM ABDO BACK WALL COMP: CPT | Mod: 26,,, | Performed by: RADIOLOGY

## 2017-08-04 PROCEDURE — 76770 US EXAM ABDO BACK WALL COMP: CPT | Mod: TC,PO

## 2017-08-08 ENCOUNTER — PATIENT MESSAGE (OUTPATIENT)
Dept: NEPHROLOGY | Facility: CLINIC | Age: 72
End: 2017-08-08

## 2017-08-08 DIAGNOSIS — N18.30 CKD (CHRONIC KIDNEY DISEASE) STAGE 3, GFR 30-59 ML/MIN: Primary | ICD-10-CM

## 2017-09-11 DIAGNOSIS — Z00.00 ANNUAL PHYSICAL EXAM: Primary | ICD-10-CM

## 2017-09-22 ENCOUNTER — OFFICE VISIT (OUTPATIENT)
Dept: CARDIOLOGY | Facility: CLINIC | Age: 72
End: 2017-09-22
Payer: MEDICARE

## 2017-09-22 VITALS
HEIGHT: 65 IN | DIASTOLIC BLOOD PRESSURE: 74 MMHG | SYSTOLIC BLOOD PRESSURE: 124 MMHG | HEART RATE: 56 BPM | WEIGHT: 163.81 LBS | BODY MASS INDEX: 27.29 KG/M2

## 2017-09-22 DIAGNOSIS — R00.1 BRADYCARDIA: ICD-10-CM

## 2017-09-22 DIAGNOSIS — E78.5 HYPERLIPIDEMIA WITH TARGET LDL LESS THAN 130: ICD-10-CM

## 2017-09-22 DIAGNOSIS — I10 ESSENTIAL HYPERTENSION: Primary | ICD-10-CM

## 2017-09-22 PROCEDURE — 99499 UNLISTED E&M SERVICE: CPT | Mod: S$GLB,,, | Performed by: INTERNAL MEDICINE

## 2017-09-22 PROCEDURE — 99213 OFFICE O/P EST LOW 20 MIN: CPT | Mod: S$GLB,,, | Performed by: INTERNAL MEDICINE

## 2017-09-22 PROCEDURE — 1159F MED LIST DOCD IN RCRD: CPT | Mod: S$GLB,,, | Performed by: INTERNAL MEDICINE

## 2017-09-22 PROCEDURE — 3078F DIAST BP <80 MM HG: CPT | Mod: S$GLB,,, | Performed by: INTERNAL MEDICINE

## 2017-09-22 PROCEDURE — 99999 PR PBB SHADOW E&M-EST. PATIENT-LVL II: CPT | Mod: PBBFAC,,, | Performed by: INTERNAL MEDICINE

## 2017-09-22 PROCEDURE — 3074F SYST BP LT 130 MM HG: CPT | Mod: S$GLB,,, | Performed by: INTERNAL MEDICINE

## 2017-09-22 PROCEDURE — 3008F BODY MASS INDEX DOCD: CPT | Mod: S$GLB,,, | Performed by: INTERNAL MEDICINE

## 2017-09-22 NOTE — PROGRESS NOTES
Subjective:   Patient ID:  Alexander Patel is a 72 y.o. male who presents for follow up of Bradycardia (low pulse today) and Dizziness (only when exerting self in the heat or dehydrated)      69 yo, male, came in for bradycardia. Part-time perez now   PMH bradycardia, HTN,HLD, orthostatic hypotension resolved, BPH. When he was young, he run 10 k monthly. He is physically active, with yard work and part time job as a perez.   Chronic slightly URENA. Syncope last year when he was sick and dehydrated.  No chest pain, dyspnea and fainting.  Dizziness resolved after using Lisinopril/flomasx alternative    No smoking/drinking/drugs.  No f/h premature CAD and sudden death.  EKG on 08- sinus juan, HR 42 bpm.  Holter in :  1. Sinus rhythm with heart rates varying between 74 and 36 bpm with an average of 47 bpm.   VENTRICULAR ARRHYTHMIAS  1. There were no PVCs. There was no ventricular ectopic activity.  SUPRA VENTRICULAR ARRHYTHMIAS  1. There were frequent PACs totalling 1093 and averaging 49 per hour. There were 47 couplets.  2. There were no episodes of sustained supraventricular tachycardia.  SINUS NODE FUNCTION  1. The circadian pattern of sinus rate variability followed a blunted curve with HRs averaging 49 bpm during waking hours and 44 bpm during sleep.   2. There was no evidence of high grade SA august block.   AV CONDUCTION  1. There was no evidence of high grade AV block.   ECHO  On 09-:  1 - Concentric hypertrophy.   2 - Normal left ventricular systolic function (EF 55-60%).   3 - Left ventricular diastolic dysfunction.   4 - Normal right ventricular systolic function .   5 - The estimated PA systolic pressure is 29 mmHg.   6 - Trivial to mild tricuspid regurgitation.                 Past Medical History:   Diagnosis Date    BPH (benign prostatic hyperplasia)     Essential hypertension 8/28/2015    Gout 2012    Hyperlipidemia LDL goal < 130        Past Surgical History:   Procedure  Laterality Date    COLONOSCOPY N/A 10/8/2015    Procedure: COLONOSCOPY;  Surgeon: Avinash Lamb MD;  Location: [facility];  Service: Endoscopy;  Laterality: N/A;    FRACTURE SURGERY         Social History   Substance Use Topics    Smoking status: Never Smoker    Smokeless tobacco: Former User     Types: Chew    Alcohol use Yes      Comment: occasional       Family History   Problem Relation Age of Onset    Heart disease Mother     Alcohol abuse Father     Emphysema Father          Review of Systems   Constitution: Negative for decreased appetite, diaphoresis, fever, weakness, malaise/fatigue and night sweats.   HENT: Negative for nosebleeds.    Eyes: Negative for blurred vision and double vision.   Cardiovascular: Negative for chest pain, claudication, dyspnea on exertion, irregular heartbeat, leg swelling, near-syncope, orthopnea, palpitations, paroxysmal nocturnal dyspnea and syncope.   Respiratory: Negative for cough, shortness of breath, sleep disturbances due to breathing, snoring, sputum production and wheezing.    Endocrine: Negative for cold intolerance and polyuria.   Hematologic/Lymphatic: Does not bruise/bleed easily.   Skin: Negative for rash.   Musculoskeletal: Negative for back pain, falls, joint pain, joint swelling and neck pain.   Gastrointestinal: Negative for abdominal pain, heartburn, nausea and vomiting.   Genitourinary: Negative for dysuria, frequency and hematuria.   Neurological: Negative for difficulty with concentration, dizziness, focal weakness, headaches, light-headedness, numbness and seizures.   Psychiatric/Behavioral: Negative for depression, memory loss and substance abuse. The patient does not have insomnia.    Allergic/Immunologic: Negative for HIV exposure and hives.       Objective:   Physical Exam   Constitutional: He is oriented to person, place, and time. He appears well-nourished.   HENT:   Head: Normocephalic.   Eyes: Pupils are equal, round, and reactive to light.    Neck: Normal carotid pulses and no JVD present. Carotid bruit is not present. No thyromegaly present.   Cardiovascular: Normal rate, regular rhythm, normal heart sounds and normal pulses.   No extrasystoles are present. PMI is not displaced.  Exam reveals no gallop and no S3.    No murmur heard.  Pulmonary/Chest: Breath sounds normal. No stridor. No respiratory distress.   Abdominal: Soft. Bowel sounds are normal. There is no tenderness. There is no rebound.   Musculoskeletal: Normal range of motion.   Neurological: He is alert and oriented to person, place, and time.   Skin: Skin is intact. No rash noted.   Psychiatric: His behavior is normal.       Lab Results   Component Value Date    CHOL 190 02/06/2017    CHOL 192 08/24/2015    CHOL 182 05/23/2014     Lab Results   Component Value Date    HDL 51 02/06/2017    HDL 42 08/24/2015    HDL 49 05/23/2014     Lab Results   Component Value Date    LDLCALC 124.0 02/06/2017    LDLCALC 114.2 08/24/2015    LDLCALC 114.8 05/23/2014     Lab Results   Component Value Date    TRIG 75 02/06/2017    TRIG 179 (H) 08/24/2015    TRIG 91 05/23/2014     Lab Results   Component Value Date    CHOLHDL 26.8 02/06/2017    CHOLHDL 21.9 08/24/2015    CHOLHDL 26.9 05/23/2014       Chemistry        Component Value Date/Time     (L) 07/28/2017 1400    K 4.3 07/28/2017 1400     07/28/2017 1400    CO2 25 07/28/2017 1400    BUN 23 07/28/2017 1400    CREATININE 1.5 (H) 07/28/2017 1400    GLU 97 07/28/2017 1400        Component Value Date/Time    CALCIUM 9.0 07/28/2017 1400    ALKPHOS 72 02/06/2017 1123    AST 22 02/06/2017 1123    ALT 17 02/06/2017 1123    BILITOT 0.6 02/06/2017 1123    ESTGFRAFRICA 52.6 (A) 07/28/2017 1400    EGFRNONAA 45.5 (A) 07/28/2017 1400          Lab Results   Component Value Date    TSH 1.401 08/24/2015     No results found for: INR, PROTIME  Lab Results   Component Value Date    WBC 8.93 08/24/2015    HGB 14.4 08/24/2015    HCT 42.4 08/24/2015    MCV 89  08/24/2015     08/24/2015     BMP  Sodium   Date Value Ref Range Status   07/28/2017 135 (L) 136 - 145 mmol/L Final     Potassium   Date Value Ref Range Status   07/28/2017 4.3 3.5 - 5.1 mmol/L Final     Chloride   Date Value Ref Range Status   07/28/2017 104 95 - 110 mmol/L Final     CO2   Date Value Ref Range Status   07/28/2017 25 23 - 29 mmol/L Final     BUN, Bld   Date Value Ref Range Status   07/28/2017 23 8 - 23 mg/dL Final     Creatinine   Date Value Ref Range Status   07/28/2017 1.5 (H) 0.5 - 1.4 mg/dL Final     Calcium   Date Value Ref Range Status   07/28/2017 9.0 8.7 - 10.5 mg/dL Final     Anion Gap   Date Value Ref Range Status   07/28/2017 6 (L) 8 - 16 mmol/L Final     eGFR if    Date Value Ref Range Status   07/28/2017 52.6 (A) >60 mL/min/1.73 m^2 Final     eGFR if non    Date Value Ref Range Status   07/28/2017 45.5 (A) >60 mL/min/1.73 m^2 Final     Comment:     Calculation used to obtain the estimated glomerular filtration  rate (eGFR) is the CKD-EPI equation. Since race is unknown   in our information system, the eGFR values for   -American and Non--American patients are given   for each creatinine result.       CrCl cannot be calculated (Patient's most recent lab result is older than the maximum 7 days allowed.).     Assessment:      1. Essential hypertension    2. Bradycardia    3. Hyperlipidemia with target LDL less than 130        Plan:   Continue Lisinopril with Flomax every another day.  Continue current meds.  Recommend heart-healthy diet, weight control and regular exercise.  Shakila. Risk modification.   RTC in 1 yr    I have reviewed all pertinent labs and cardiac studies. Plans and recommendations have been formulated under my direct supervision. All questions answered and patient voiced understanding. Patient to continue current medications.

## 2017-09-22 NOTE — LETTER
September 22, 2017      [place]  [address] [address]  [place] [address]           [facility]  [address] [facility]  [place] [address]  Phone: [phone]  Fax: [phone]          Patient: Alexander Patel   MR Number: [id number]   YOB: 1945   Date of Visit: 9/22/2017       Dear Eulalia Kirkland:    Thank you for referring Alexander Patel to me for evaluation. Attached you will find relevant portions of my assessment and plan of care.    If you have questions, please do not hesitate to call me. I look forward to following Alexander Patel along with you.    Sincerely,    Ryan Stern MD    Enclosure  CC:  No Recipients    If you would like to receive this communication electronically, please contact [email or web address] or [phone] to request more information on [employer] Link access.    For providers and/or their staff who would like to refer a patient to [facility], please contact us through our one-stop-shop provider referral line, [facility] , at [phone].    If you feel you have received this communication in error or would no longer like to receive these types of communications, please e-mail [email or web address]

## 2017-10-17 DIAGNOSIS — I10 ESSENTIAL HYPERTENSION: ICD-10-CM

## 2017-10-17 DIAGNOSIS — N40.0 BENIGN PROSTATIC HYPERPLASIA: ICD-10-CM

## 2017-10-17 RX ORDER — LISINOPRIL 10 MG/1
10 TABLET ORAL EVERY OTHER DAY
Qty: 45 TABLET | Refills: 3 | Status: SHIPPED | OUTPATIENT
Start: 2017-10-17 | End: 2018-05-14 | Stop reason: SDUPTHER

## 2017-10-17 RX ORDER — TAMSULOSIN HYDROCHLORIDE 0.4 MG/1
0.4 CAPSULE ORAL EVERY OTHER DAY
Qty: 45 CAPSULE | Refills: 3 | Status: SHIPPED | OUTPATIENT
Start: 2017-10-17 | End: 2018-05-14 | Stop reason: SDUPTHER

## 2017-12-04 ENCOUNTER — LAB VISIT (OUTPATIENT)
Dept: LAB | Facility: HOSPITAL | Age: 72
End: 2017-12-04
Attending: FAMILY MEDICINE
Payer: MEDICARE

## 2017-12-04 DIAGNOSIS — N18.30 CKD (CHRONIC KIDNEY DISEASE) STAGE 3, GFR 30-59 ML/MIN: ICD-10-CM

## 2017-12-04 LAB
25(OH)D3+25(OH)D2 SERPL-MCNC: 28 NG/ML
ALBUMIN SERPL BCP-MCNC: 4 G/DL
ANION GAP SERPL CALC-SCNC: 6 MMOL/L
BUN SERPL-MCNC: 24 MG/DL
CALCIUM SERPL-MCNC: 9.7 MG/DL
CHLORIDE SERPL-SCNC: 107 MMOL/L
CO2 SERPL-SCNC: 26 MMOL/L
CREAT SERPL-MCNC: 1.4 MG/DL
EST. GFR  (AFRICAN AMERICAN): 57.2 ML/MIN/1.73 M^2
EST. GFR  (NON AFRICAN AMERICAN): 49.5 ML/MIN/1.73 M^2
GLUCOSE SERPL-MCNC: 100 MG/DL
PHOSPHATE SERPL-MCNC: 3.1 MG/DL
POTASSIUM SERPL-SCNC: 4.9 MMOL/L
PTH-INTACT SERPL-MCNC: 62 PG/ML
SODIUM SERPL-SCNC: 139 MMOL/L

## 2017-12-04 PROCEDURE — 36415 COLL VENOUS BLD VENIPUNCTURE: CPT | Mod: PO

## 2017-12-04 PROCEDURE — 82306 VITAMIN D 25 HYDROXY: CPT

## 2017-12-04 PROCEDURE — 80069 RENAL FUNCTION PANEL: CPT

## 2017-12-04 PROCEDURE — 83970 ASSAY OF PARATHORMONE: CPT

## 2017-12-15 ENCOUNTER — OFFICE VISIT (OUTPATIENT)
Dept: NEPHROLOGY | Facility: CLINIC | Age: 72
End: 2017-12-15
Payer: MEDICARE

## 2017-12-15 VITALS
SYSTOLIC BLOOD PRESSURE: 142 MMHG | DIASTOLIC BLOOD PRESSURE: 76 MMHG | HEIGHT: 64 IN | BODY MASS INDEX: 27.86 KG/M2 | TEMPERATURE: 98 F | RESPIRATION RATE: 14 BRPM | WEIGHT: 163.19 LBS | HEART RATE: 45 BPM

## 2017-12-15 DIAGNOSIS — E78.5 HYPERLIPIDEMIA WITH TARGET LDL LESS THAN 130: ICD-10-CM

## 2017-12-15 DIAGNOSIS — I49.5 SSS (SICK SINUS SYNDROME): ICD-10-CM

## 2017-12-15 DIAGNOSIS — N18.30 CKD (CHRONIC KIDNEY DISEASE) STAGE 3, GFR 30-59 ML/MIN: Primary | ICD-10-CM

## 2017-12-15 DIAGNOSIS — I10 ESSENTIAL HYPERTENSION: ICD-10-CM

## 2017-12-15 PROCEDURE — 99999 PR PBB SHADOW E&M-EST. PATIENT-LVL III: CPT | Mod: PBBFAC,,, | Performed by: INTERNAL MEDICINE

## 2017-12-15 PROCEDURE — 99213 OFFICE O/P EST LOW 20 MIN: CPT | Mod: S$GLB,,, | Performed by: INTERNAL MEDICINE

## 2017-12-15 PROCEDURE — 99499 UNLISTED E&M SERVICE: CPT | Mod: S$GLB,,, | Performed by: INTERNAL MEDICINE

## 2017-12-25 PROBLEM — N17.9 AKI (ACUTE KIDNEY INJURY): Status: RESOLVED | Noted: 2017-07-31 | Resolved: 2017-12-25

## 2017-12-25 PROBLEM — N18.30 CKD (CHRONIC KIDNEY DISEASE) STAGE 3, GFR 30-59 ML/MIN: Status: ACTIVE | Noted: 2017-12-25

## 2017-12-26 ENCOUNTER — OFFICE VISIT (OUTPATIENT)
Dept: FAMILY MEDICINE | Facility: CLINIC | Age: 72
End: 2017-12-26
Payer: MEDICARE

## 2017-12-26 ENCOUNTER — HOSPITAL ENCOUNTER (OUTPATIENT)
Dept: RADIOLOGY | Facility: HOSPITAL | Age: 72
Discharge: HOME | End: 2017-12-26
Attending: NURSE PRACTITIONER
Payer: MEDICARE

## 2017-12-26 VITALS
HEART RATE: 41 BPM | WEIGHT: 165.19 LBS | HEIGHT: 65 IN | DIASTOLIC BLOOD PRESSURE: 66 MMHG | SYSTOLIC BLOOD PRESSURE: 119 MMHG | TEMPERATURE: 98 F | BODY MASS INDEX: 27.52 KG/M2

## 2017-12-26 DIAGNOSIS — J20.9 ACUTE BRONCHITIS, UNSPECIFIED ORGANISM: Primary | ICD-10-CM

## 2017-12-26 DIAGNOSIS — R05.9 COUGH: ICD-10-CM

## 2017-12-26 PROCEDURE — 71020 XR CHEST PA AND LATERAL: CPT | Mod: 26,,, | Performed by: RADIOLOGY

## 2017-12-26 PROCEDURE — 99213 OFFICE O/P EST LOW 20 MIN: CPT | Mod: 25,S$GLB,, | Performed by: NURSE PRACTITIONER

## 2017-12-26 PROCEDURE — 71020 XR CHEST PA AND LATERAL: CPT | Mod: TC,PO

## 2017-12-26 PROCEDURE — 99999 PR PBB SHADOW E&M-EST. PATIENT-LVL III: CPT | Mod: PBBFAC,,, | Performed by: NURSE PRACTITIONER

## 2017-12-26 PROCEDURE — 96372 THER/PROPH/DIAG INJ SC/IM: CPT | Mod: S$GLB,,, | Performed by: FAMILY MEDICINE

## 2017-12-26 RX ORDER — DEXAMETHASONE SODIUM PHOSPHATE 4 MG/ML
8 INJECTION, SOLUTION INTRA-ARTICULAR; INTRALESIONAL; INTRAMUSCULAR; INTRAVENOUS; SOFT TISSUE
Status: COMPLETED | OUTPATIENT
Start: 2017-12-26 | End: 2017-12-26

## 2017-12-26 RX ORDER — ALBUTEROL SULFATE 90 UG/1
2 AEROSOL, METERED RESPIRATORY (INHALATION) EVERY 6 HOURS PRN
Qty: 18 G | Refills: 1 | Status: SHIPPED | OUTPATIENT
Start: 2017-12-26 | End: 2018-05-14

## 2017-12-26 RX ORDER — CODEINE PHOSPHATE AND GUAIFENESIN 10; 100 MG/5ML; MG/5ML
10 SOLUTION ORAL EVERY 4 HOURS PRN
Qty: 240 ML | Refills: 0 | Status: SHIPPED | OUTPATIENT
Start: 2017-12-26 | End: 2018-01-05

## 2017-12-26 RX ORDER — DOXYCYCLINE 100 MG/1
100 CAPSULE ORAL EVERY 12 HOURS
Qty: 14 CAPSULE | Refills: 0 | Status: SHIPPED | OUTPATIENT
Start: 2017-12-26 | End: 2018-01-02

## 2017-12-26 RX ADMIN — DEXAMETHASONE SODIUM PHOSPHATE 8 MG: 4 INJECTION, SOLUTION INTRA-ARTICULAR; INTRALESIONAL; INTRAMUSCULAR; INTRAVENOUS; SOFT TISSUE at 10:12

## 2017-12-26 NOTE — PROGRESS NOTES
Subjective:       Patient ID: Alexander Patel is a 72 y.o. White male who presents for re-evaluation of progression of Chronic Kidney Disease (lab results)    HPI     He reports he is doing well. He is active but no routine exercise. No LE edema and no SOB. Appetite is fine and weight is stable. He feels he stays hydrated    Review of Systems   Constitutional: Negative for activity change, appetite change, fatigue and unexpected weight change.   HENT: Negative for facial swelling.    Respiratory: Negative for shortness of breath.    Cardiovascular: Negative for chest pain and leg swelling.   Gastrointestinal: Negative for abdominal pain, constipation and diarrhea.   Genitourinary: Positive for frequency. Negative for difficulty urinating, dysuria and hematuria.   Musculoskeletal: Negative for arthralgias.   Neurological: Negative for weakness.   Hematological: Does not bruise/bleed easily.   Psychiatric/Behavioral: Negative for decreased concentration.       Objective:      Physical Exam   Constitutional: He is oriented to person, place, and time. He appears well-developed and well-nourished. No distress.   Neck: No JVD present.   Cardiovascular: S1 normal and S2 normal.  Exam reveals no friction rub.    Pulmonary/Chest: Breath sounds normal. He has no wheezes. He has no rales.   Abdominal: Soft.   Musculoskeletal: He exhibits no edema.   Neurological: He is alert and oriented to person, place, and time.   Skin: Skin is warm and dry.   Psychiatric: He has a normal mood and affect.   Nursing note and vitals reviewed.      Assessment:       1. CKD (chronic kidney disease) stage 3, GFR 30-59 ml/min    2. Essential hypertension    3. SSS (sick sinus syndrome)    4. Hyperlipidemia with target LDL less than 130        Plan:             CKD Stage 3. His kidney function from ZEINA which remains unclear as to the etiology has overall improved and stable.     HTN is controlled    Mineral and Bone Disease--add D3  2000u/day      RTC 5 months

## 2017-12-26 NOTE — PATIENT INSTRUCTIONS
What Is Acute Bronchitis?  Acute bronchitis is when the airways in your lungs (bronchial tubes) become red and swollen (inflamed). It is usually caused by a viral infection. But it can also occur because of a bacteria or allergen. Symptoms include a cough that produces yellow or greenish mucus and can last for days or sometimes weeks.  Inside healthy lungs    Air travels in and out of the lungs through the airways. The linings of these airways produce sticky mucus. This mucus traps particles that enter the lungs. Tiny structures called cilia then sweep the particles out of the airways.     Healthy airway: Airways are normally open. Air moves in and out easily.      Healthy cilia: Tiny, hairlike cilia sweep mucus and particles up and out of the airways.   Lungs with bronchitis  Bronchitis often occurs with a cold or the flu virus. The airways become inflamed (red and swollen). There is a deep hacking cough from the extra mucus. Other symptoms may include:  · Wheezing  · Chest discomfort  · Shortness of breath  · Mild fever  A second infection, this time due to bacteria, may then occur. And airways irritated by allergens or smoke are more likely to get infected.        Inflamed airway: Inflammation and extra mucus narrow the airway, causing shortness of breath.      Impaired cilia: Extra mucus impairs cilia, causing congestion and wheezing. Smoking makes the problem worse.   Making a diagnosis  A physical exam, health history, and certain tests help your healthcare provider make the diagnosis.  Health history  Your healthcare provider will ask you about your symptoms.  The exam  Your provider listens to your chest for signs of congestion. He or she may also check your ears, nose, and throat.  Possible tests  · A sputum test for bacteria. This requires a sample of mucus from your lungs.  · A nasal or throat swab. This tests to see if you have a bacterial infection.  · A chest X-ray. This is done if your healthcare  provider thinks you have pneumonia.  · Tests to check for an underlying condition. Other tests may be done to check for things such as allergies, asthma, or COPD (chronic obstructive pulmonary disease). You may need to see a specialist for more lung function testing.  Treating a cough  The main treatment for bronchitis is easing symptoms. Avoiding smoke, allergens, and other things that trigger coughing can often help. If the infection is bacterial, you may be given antibiotics. During the illness, it's important to get plenty of sleep. To ease symptoms:  · Dont smoke. Also avoid secondhand smoke.  · Use a humidifier. Or try breathing in steam from a hot shower. This may help loosen mucus.  · Drink a lot of water and juice. They can soothe the throat and may help thin mucus.  · Sit up or use extra pillows when in bed. This helps to lessen coughing and congestion.  · Ask your provider about using medicine. Ask about using cough medicine, pain and fever medicine, or a decongestant.  Antibiotics  Most cases of bronchitis are caused by cold or flu viruses. They dont need antibiotics to treat them, even if your mucus is thick and green or yellow. Antibiotics dont treat viral illness and antibiotics have not been shown to have any benefit in cases of acute bronchitis. Taking antibiotics when they are not needed increases your risk of getting an infection later that is antibiotic-resistant. Antibiotics can also cause severe cases of diarrhea that require other antibiotics to treat.  It is important that you accept your healthcare provider's opinion to not use antibiotics. Your provider will prescribe antibiotics if the infection is caused by bacteria. If they are prescribed:  · Take all of the medicine. Take the medicine until it is used up, even if symptoms have improved. If you dont, the bronchitis may come back.  · Take the medicines as directed. For instance, some medicines should be taken with food.  · Ask about  side effects. Ask your provider or pharmacist what side effects are common, and what to do about them.  Follow-up care  You should see your provider again in 2 to 3 weeks. By this time, symptoms should have improved. An infection that lasts longer may mean you have a more serious problem.  Prevention  · Avoid tobacco smoke. If you smoke, quit. Stay away from smoky places. Ask friends and family not to smoke around you, or in your home or car.  · Get checked for allergies.  · Ask your provider about getting a yearly flu shot. Also ask about pneumococcal or pneumonia shots.  · Wash your hands often. This helps reduce the chance of picking up viruses that cause colds and flu.  Call your healthcare provider if:  · Symptoms worsen, or you have new symptoms  · Breathing problems worsen or  become severe  · Symptoms dont get better within a week, or within 3 days of taking antibiotics   Date Last Reviewed: 2/1/2017  © 1886-4608 [employer]. [address] [place] [address], [place] [address]. All rights reserved. This information is not intended as a substitute for professional medical care. Always follow your healthcare professional's instructions.

## 2017-12-26 NOTE — PROGRESS NOTES
"Subjective:      Patient ID: Alexander Patel is a 72 y.o. male.    Chief Complaint: Cough and Chest Congestion    Cough   This is a new problem. The current episode started in the past 7 days (Sunday). The problem has been gradually worsening. The cough is non-productive. Associated symptoms include nasal congestion, postnasal drip and rhinorrhea. Pertinent negatives include no chest pain, chills, ear congestion, ear pain, fever, headaches, hemoptysis, myalgias, rash, sore throat, shortness of breath or wheezing. Nothing aggravates the symptoms. Treatments tried: Nyquil. The treatment provided moderate relief.     Review of Systems   Constitutional: Negative for chills, fatigue and fever.   HENT: Positive for postnasal drip and rhinorrhea. Negative for congestion, ear pain and sore throat.    Eyes: Negative.    Respiratory: Positive for cough. Negative for hemoptysis, shortness of breath and wheezing.    Cardiovascular: Negative for chest pain, palpitations and leg swelling.   Gastrointestinal: Negative.    Endocrine: Negative.    Genitourinary: Negative.    Musculoskeletal: Negative.  Negative for myalgias.   Skin: Negative for rash and wound.   Allergic/Immunologic: Negative.    Neurological: Negative for weakness, numbness and headaches.   Psychiatric/Behavioral: The patient is not nervous/anxious.        Objective:     /66 (BP Location: Left arm, Patient Position: Sitting, BP Method: Medium (Automatic))   Pulse (!) 41   Temp 97.8 °F (36.6 °C) (Oral)   Ht 5' 5" (1.651 m)   Wt 74.9 kg (165 lb 3.2 oz)   BMI 27.49 kg/m²     Physical Exam   Constitutional: He is oriented to person, place, and time. He appears well-developed and well-nourished.   HENT:   Head: Normocephalic.   Right Ear: Tympanic membrane normal.   Left Ear: Tympanic membrane normal.   Nose: Mucosal edema and rhinorrhea (nasal mucosa erythematous and boggy) present. Right sinus exhibits no maxillary sinus tenderness and no frontal sinus " tenderness. Left sinus exhibits no maxillary sinus tenderness and no frontal sinus tenderness.   Mouth/Throat: Oropharynx is clear and moist. No oropharyngeal exudate, posterior oropharyngeal edema or posterior oropharyngeal erythema.   Eyes: Pupils are equal, round, and reactive to light.   Cardiovascular: Normal rate, regular rhythm and normal heart sounds.    Pulmonary/Chest: Effort normal. No respiratory distress. He has no decreased breath sounds. He has wheezes in the left upper field, the left middle field and the left lower field. He has no rhonchi. He has no rales.   Lymphadenopathy:     He has no cervical adenopathy.   Neurological: He is alert and oriented to person, place, and time.   Skin: Skin is warm and dry. No rash noted.   Psychiatric: He has a normal mood and affect. His behavior is normal. Judgment and thought content normal.   Vitals reviewed.    Assessment:     1. Acute bronchitis, unspecified organism        Plan:     Problem List Items Addressed This Visit     None      Visit Diagnoses     Acute bronchitis, unspecified organism    -  Primary    Relevant Medications    dexamethasone injection 8 mg (Completed)    doxycycline (VIBRAMYCIN) 100 MG Cap    albuterol 90 mcg/actuation inhaler    guaifenesin-codeine 100-10 mg/5 ml (GUAIFENESIN AC)  mg/5 mL syrup    Other Relevant Orders    X-Ray Chest PA And Lateral (Completed)      Symptomatic care discussed and educational handout provided  Report to ER if symptoms worsen    Return if symptoms worsen or fail to improve.

## 2017-12-29 ENCOUNTER — OFFICE VISIT (OUTPATIENT)
Dept: FAMILY MEDICINE | Facility: CLINIC | Age: 72
End: 2017-12-29
Payer: MEDICARE

## 2017-12-29 VITALS
HEIGHT: 65 IN | SYSTOLIC BLOOD PRESSURE: 129 MMHG | HEART RATE: 45 BPM | DIASTOLIC BLOOD PRESSURE: 65 MMHG | WEIGHT: 162.94 LBS | TEMPERATURE: 98 F | BODY MASS INDEX: 27.15 KG/M2

## 2017-12-29 DIAGNOSIS — J06.9 UPPER RESPIRATORY TRACT INFECTION, UNSPECIFIED TYPE: Primary | ICD-10-CM

## 2017-12-29 PROCEDURE — 99999 PR PBB SHADOW E&M-EST. PATIENT-LVL III: CPT | Mod: PBBFAC,,, | Performed by: FAMILY MEDICINE

## 2017-12-29 PROCEDURE — 99213 OFFICE O/P EST LOW 20 MIN: CPT | Mod: S$GLB,,, | Performed by: FAMILY MEDICINE

## 2017-12-29 RX ORDER — METHYLPREDNISOLONE 4 MG/1
TABLET ORAL
Qty: 1 PACKAGE | Refills: 0 | Status: SHIPPED | OUTPATIENT
Start: 2017-12-29 | End: 2018-01-19

## 2017-12-29 RX ORDER — CEFACLOR 500 MG
500 CAPSULE ORAL 2 TIMES DAILY
Qty: 14 CAPSULE | Refills: 0 | Status: SHIPPED | OUTPATIENT
Start: 2017-12-29 | End: 2018-05-14

## 2017-12-29 NOTE — PROGRESS NOTES
Alexander Patel presents with moderate upper respiratory congestion,rhinnorhea,moderate cough past 2-3 days. OTC help slightly. Denies nausea,vomiting,diarrhea or significant fever.    Past Medical History:   Diagnosis Date    BPH (benign prostatic hyperplasia)     Essential hypertension 8/28/2015    Gout 2012    Hyperlipidemia LDL goal < 130      Past Surgical History:   Procedure Laterality Date    COLONOSCOPY N/A 10/8/2015    Procedure: COLONOSCOPY;  Surgeon: Avinash Lamb MD;  Location: [facility];  Service: Endoscopy;  Laterality: N/A;    FRACTURE SURGERY       Review of patient's allergies indicates:   Allergen Reactions    No known drug allergies      Current Outpatient Prescriptions on File Prior to Visit   Medication Sig Dispense Refill    albuterol 90 mcg/actuation inhaler Inhale 2 puffs into the lungs every 6 (six) hours as needed for Wheezing. 18 g 1    aspirin 81 MG chewable tablet Every day      doxycycline (VIBRAMYCIN) 100 MG Cap Take 1 capsule (100 mg total) by mouth every 12 (twelve) hours. 14 capsule 0    GLUCOSAMINE HCL/CHONDR LAM A NA (OSTEO BI-FLEX ORAL) Take 1 tablet by mouth once daily.       guaifenesin-codeine 100-10 mg/5 ml (GUAIFENESIN AC)  mg/5 mL syrup Take 10 mLs by mouth every 4 (four) hours as needed for Cough. 240 mL 0    lisinopril 10 MG tablet TAKE 1 TABLET (10 MG TOTAL) BY MOUTH EVERY OTHER DAY. 45 tablet 3    tamsulosin (FLOMAX) 0.4 mg Cp24 TAKE 1 CAPSULE (0.4 MG TOTAL) BY MOUTH EVERY OTHER DAY. 45 capsule 3     No current facility-administered medications on file prior to visit.      Social History     Social History    Marital status:      Spouse name: Modesta    Number of children: 2    Years of education: N/A     Occupational History    retired      Social History Main Topics    Smoking status: Never Smoker    Smokeless tobacco: Former User     Types: Chew    Alcohol use Yes      Comment: occasional    Drug use: No    Sexual activity: Yes      Other Topics Concern    Not on file     Social History Narrative    No narrative on file     Family History   Problem Relation Age of Onset    Heart disease Mother     Alcohol abuse Father     Emphysema Father          ROS:  SKIN: No rashes, itching or changes in color or texture of skin.  EYES: Visual acuity fine. No photophobia, ocular pain or diplopia.EARS: Denies ear pain, discharge or vertigo.NOSE: No loss of smell, no epistaxis some postnasal drip.MOUTH & THROAT: No hoarseness or change in voice. No excessive gum bleeding.CHEST: Denies URENA, cyanosis, wheezing  CARDIOVASCULAR: Denies chest pain, PND, orthopnea or reduced exercise tolerance.  ABDOMEN:  No weight loss.No abdominal pain, no hematemesis or blood in stool.  URINARY: No flank pain, dysuria or hematuria.  PERIPHERAL VASCULAR: No claudication or cyanosis.  MUSCULOSKELETAL: Negative   NEUROLOGIC: No history of seizures, paralysis, alteration of gait or coordination.    PE: Vital signs as noted  Heent:Normocephalic with no recent cranial trauma,PERRLA,EOMI,conjunctiva clear,fundi reveal no hemmorhage exudate or papilledema.Otic canals clear, tympanic membranes slightly dull bilaterally.Nasal mucosa slightly red and edematous.Posterior pharynx slightly red but without exudate.  Neck:Supple with minimal anterior cervical adenopathy.  Chest:Clear bilateral breath sounds with mild scattered ronchi  Heart:Regular rhthym without murmer  Abdomen:Soft, non tender,no masses, no hepatosplenomegalyExtremeties and Neurologic:Grossly within normal limits  Impression: Upper Respiratory Infection. 465.9  Plan:   Ceclor 500 medrol dspk

## 2018-01-23 ENCOUNTER — PES CALL (OUTPATIENT)
Dept: ADMINISTRATIVE | Facility: CLINIC | Age: 73
End: 2018-01-23

## 2018-03-22 ENCOUNTER — PES CALL (OUTPATIENT)
Dept: ADMINISTRATIVE | Facility: CLINIC | Age: 73
End: 2018-03-22

## 2018-04-23 ENCOUNTER — LAB VISIT (OUTPATIENT)
Dept: LAB | Facility: HOSPITAL | Age: 73
End: 2018-04-23
Attending: FAMILY MEDICINE
Payer: MEDICARE

## 2018-04-23 DIAGNOSIS — N18.30 CKD (CHRONIC KIDNEY DISEASE) STAGE 3, GFR 30-59 ML/MIN: ICD-10-CM

## 2018-04-23 LAB
25(OH)D3+25(OH)D2 SERPL-MCNC: 26 NG/ML
ALBUMIN SERPL BCP-MCNC: 4.1 G/DL
ANION GAP SERPL CALC-SCNC: 7 MMOL/L
BASOPHILS # BLD AUTO: 0.06 K/UL
BASOPHILS NFR BLD: 1 %
BUN SERPL-MCNC: 22 MG/DL
CALCIUM SERPL-MCNC: 9.7 MG/DL
CHLORIDE SERPL-SCNC: 106 MMOL/L
CO2 SERPL-SCNC: 27 MMOL/L
CREAT SERPL-MCNC: 1.3 MG/DL
DIFFERENTIAL METHOD: ABNORMAL
EOSINOPHIL # BLD AUTO: 0.1 K/UL
EOSINOPHIL NFR BLD: 2.4 %
ERYTHROCYTE [DISTWIDTH] IN BLOOD BY AUTOMATED COUNT: 12.6 %
EST. GFR  (AFRICAN AMERICAN): >60 ML/MIN/1.73 M^2
EST. GFR  (NON AFRICAN AMERICAN): 53.8 ML/MIN/1.73 M^2
GLUCOSE SERPL-MCNC: 90 MG/DL
HCT VFR BLD AUTO: 41.3 %
HGB BLD-MCNC: 13.6 G/DL
IMM GRANULOCYTES # BLD AUTO: 0.02 K/UL
IMM GRANULOCYTES NFR BLD AUTO: 0.3 %
LYMPHOCYTES # BLD AUTO: 1.7 K/UL
LYMPHOCYTES NFR BLD: 29.7 %
MCH RBC QN AUTO: 29.5 PG
MCHC RBC AUTO-ENTMCNC: 32.9 G/DL
MCV RBC AUTO: 90 FL
MONOCYTES # BLD AUTO: 0.6 K/UL
MONOCYTES NFR BLD: 9.6 %
NEUTROPHILS # BLD AUTO: 3.3 K/UL
NEUTROPHILS NFR BLD: 57 %
NRBC BLD-RTO: 0 /100 WBC
PHOSPHATE SERPL-MCNC: 3.2 MG/DL
PLATELET # BLD AUTO: 158 K/UL
PMV BLD AUTO: 11.3 FL
POTASSIUM SERPL-SCNC: 5 MMOL/L
PTH-INTACT SERPL-MCNC: 71 PG/ML
RBC # BLD AUTO: 4.61 M/UL
SODIUM SERPL-SCNC: 140 MMOL/L
WBC # BLD AUTO: 5.82 K/UL

## 2018-04-23 PROCEDURE — 80069 RENAL FUNCTION PANEL: CPT

## 2018-04-23 PROCEDURE — 36415 COLL VENOUS BLD VENIPUNCTURE: CPT | Mod: PO

## 2018-04-23 PROCEDURE — 82306 VITAMIN D 25 HYDROXY: CPT

## 2018-04-23 PROCEDURE — 85025 COMPLETE CBC W/AUTO DIFF WBC: CPT

## 2018-04-23 PROCEDURE — 83970 ASSAY OF PARATHORMONE: CPT

## 2018-04-30 ENCOUNTER — PATIENT OUTREACH (OUTPATIENT)
Dept: ADMINISTRATIVE | Facility: HOSPITAL | Age: 73
End: 2018-04-30

## 2018-04-30 NOTE — LETTER
May 7, 2018    Alexander Patel  [address] [place] [address]           [facility]  [address]  [facility] [address]  Phone: [phone] Dear Mr. Patel:    [facility] is committed to your overall health.  To help you get the most out of each of your visits, we will review your information to make sure you are up to date on all of your recommended tests and/or procedures.      Avinash Lamb MD has found that you may be due for   Health Maintenance Due   Topic    Zoster Vaccine     Abdominal Aortic Aneurysm Screening     Lipid Panel       If you have had any of the above done at another facility, please bring the records or information with you so that your record at [facility] will be complete.    If you are currently taking medication, please bring it with you to your appointment for review.    We will be happy to assist you with scheduling any necessary appointments or you may contact the [facility] appointment desk at [phone] to schedule at your convenience.   This information was sent to you via your patient portal.  Please check your message portal for important information.      Thank you for choosing [facility] for your healthcare needs,    If you have any questions or concerns, please don't hesitate to call.    Sincerely,    OLEGARIO Bryant  Care Coordination Department  [facility]  [phone]

## 2018-05-14 ENCOUNTER — OFFICE VISIT (OUTPATIENT)
Dept: FAMILY MEDICINE | Facility: CLINIC | Age: 73
End: 2018-05-14
Payer: MEDICARE

## 2018-05-14 VITALS
DIASTOLIC BLOOD PRESSURE: 64 MMHG | HEART RATE: 43 BPM | WEIGHT: 167.19 LBS | HEIGHT: 65 IN | BODY MASS INDEX: 27.86 KG/M2 | TEMPERATURE: 98 F | SYSTOLIC BLOOD PRESSURE: 128 MMHG

## 2018-05-14 DIAGNOSIS — E78.5 HYPERLIPIDEMIA WITH TARGET LDL LESS THAN 130: ICD-10-CM

## 2018-05-14 DIAGNOSIS — R94.4 DECREASED GFR: ICD-10-CM

## 2018-05-14 DIAGNOSIS — I10 ESSENTIAL HYPERTENSION: Primary | ICD-10-CM

## 2018-05-14 DIAGNOSIS — Z13.6 ENCOUNTER FOR ABDOMINAL AORTIC ANEURYSM (AAA) SCREENING: ICD-10-CM

## 2018-05-14 DIAGNOSIS — N40.0 BENIGN PROSTATIC HYPERPLASIA, UNSPECIFIED WHETHER LOWER URINARY TRACT SYMPTOMS PRESENT: ICD-10-CM

## 2018-05-14 PROCEDURE — 3078F DIAST BP <80 MM HG: CPT | Mod: CPTII,S$GLB,, | Performed by: FAMILY MEDICINE

## 2018-05-14 PROCEDURE — 99499 UNLISTED E&M SERVICE: CPT | Mod: S$GLB,,, | Performed by: FAMILY MEDICINE

## 2018-05-14 PROCEDURE — 99999 PR PBB SHADOW E&M-EST. PATIENT-LVL III: CPT | Mod: PBBFAC,,, | Performed by: FAMILY MEDICINE

## 2018-05-14 PROCEDURE — 3074F SYST BP LT 130 MM HG: CPT | Mod: CPTII,S$GLB,, | Performed by: FAMILY MEDICINE

## 2018-05-14 PROCEDURE — 99214 OFFICE O/P EST MOD 30 MIN: CPT | Mod: S$GLB,,, | Performed by: FAMILY MEDICINE

## 2018-05-14 RX ORDER — TAMSULOSIN HYDROCHLORIDE 0.4 MG/1
0.4 CAPSULE ORAL EVERY OTHER DAY
Qty: 45 CAPSULE | Refills: 3 | Status: SHIPPED | OUTPATIENT
Start: 2018-05-14 | End: 2018-07-18 | Stop reason: SDUPTHER

## 2018-05-14 RX ORDER — LISINOPRIL 10 MG/1
10 TABLET ORAL EVERY OTHER DAY
Qty: 45 TABLET | Refills: 3 | Status: SHIPPED | OUTPATIENT
Start: 2018-05-14 | End: 2018-07-18 | Stop reason: SDUPTHER

## 2018-05-14 NOTE — PROGRESS NOTES
"Subjective:      Patient ID: Alexander Patel is a 73 y.o. male.    Chief Complaint: Annual Exam    Problem List Items Addressed This Visit     BPH (benign prostatic hyperplasia)    Overview     He has BPH and this has been stable.  He has to get up to the restroom once a night on average. The tamsulosin has helped.           Decreased GFR    Overview     He has had a Decreased GFR that is followed.   BMP  Lab Results   Component Value Date     04/23/2018    K 5.0 04/23/2018     04/23/2018    CO2 27 04/23/2018    BUN 22 04/23/2018    CREATININE 1.3 04/23/2018    CALCIUM 9.7 04/23/2018    ANIONGAP 7 (L) 04/23/2018    ESTGFRAFRICA >60.0 04/23/2018    EGFRNONAA 53.8 (A) 04/23/2018     He has been stable with this and it will be checked over time.         Essential hypertension    Overview     The patient presents with essential hypertension.  The patient is tolerating the medication well and is in excellent compliance.  The patient is experiencing no side effects.  Counseling was offered regarding low salt diets.  The patient has a reduced salt intake.  The patient denies chest pain, palpitations, shortness of breath, dyspnea on exertion, left or murmur neck pain, nausea, vomiting, diaphoresis, paroxysmal nocturnal dyspnea, and orthopnea.   /64 Comment: Manual  Pulse (!) 43 Comment: Manual  Temp 97.6 °F (36.4 °C) (Oral)   Ht 5' 5" (1.651 m)   Wt 75.8 kg (167 lb 3.2 oz)   BMI 27.82 kg/m²            Hyperlipidemia with target LDL less than 130    Overview     He has hyperlipidemia but has not required medicine for this.  I will check this in Oct.  Lab Results   Component Value Date    CHOL 190 02/06/2017    CHOL 192 08/24/2015    CHOL 182 05/23/2014     Lab Results   Component Value Date    HDL 51 02/06/2017    HDL 42 08/24/2015    HDL 49 05/23/2014     Lab Results   Component Value Date    LDLCALC 124.0 02/06/2017    LDLCALC 114.2 08/24/2015    LDLCALC 114.8 05/23/2014     Lab Results   Component " Value Date    TRIG 75 02/06/2017    TRIG 179 (H) 08/24/2015    TRIG 91 05/23/2014     Lab Results   Component Value Date    CHOLHDL 26.8 02/06/2017    CHOLHDL 21.9 08/24/2015    CHOLHDL 26.9 05/23/2014                    Past Medical History:  Past Medical History:   Diagnosis Date    BPH (benign prostatic hyperplasia)     Essential hypertension 8/28/2015    Gout 2012    Hyperlipidemia LDL goal < 130      Past Surgical History:   Procedure Laterality Date    COLONOSCOPY N/A 10/8/2015    Procedure: COLONOSCOPY;  Surgeon: Avinash Lamb MD;  Location: [facility];  Service: Endoscopy;  Laterality: N/A;    FRACTURE SURGERY       Review of patient's allergies indicates:   Allergen Reactions    No known drug allergies      Current Outpatient Prescriptions on File Prior to Visit   Medication Sig Dispense Refill    aspirin 81 MG chewable tablet Every day      GLUCOSAMINE HCL/CHONDR LAM A NA (OSTEO BI-FLEX ORAL) Take 1 tablet by mouth once daily.       lisinopril 10 MG tablet TAKE 1 TABLET (10 MG TOTAL) BY MOUTH EVERY OTHER DAY. 45 tablet 3    tamsulosin (FLOMAX) 0.4 mg Cp24 TAKE 1 CAPSULE (0.4 MG TOTAL) BY MOUTH EVERY OTHER DAY. 45 capsule 3    [DISCONTINUED] albuterol 90 mcg/actuation inhaler Inhale 2 puffs into the lungs every 6 (six) hours as needed for Wheezing. 18 g 1    [DISCONTINUED] cefaclor (CECLOR) 500 mg Cap Take 1 capsule (500 mg total) by mouth 2 (two) times daily. 14 capsule 0     No current facility-administered medications on file prior to visit.      Social History     Social History    Marital status:      Spouse name: Modesta    Number of children: 2    Years of education: N/A     Occupational History    retired      Social History Main Topics    Smoking status: Never Smoker    Smokeless tobacco: Former User     Types: Chew    Alcohol use Yes      Comment: occasional    Drug use: No    Sexual activity: Yes     Other Topics Concern    Not on file     Social History Narrative     "No narrative on file     Family History   Problem Relation Age of Onset    Heart disease Mother     Alcohol abuse Father     Emphysema Father        Review of Systems   Constitutional: Negative.  Negative for chills, diaphoresis and fever.   HENT: Negative for congestion, hearing loss, mouth sores, postnasal drip and sore throat.    Eyes: Negative for pain and visual disturbance.   Respiratory: Negative for cough, chest tightness, shortness of breath and wheezing.    Cardiovascular: Negative for chest pain.   Gastrointestinal: Negative for abdominal pain, anal bleeding, blood in stool, constipation, diarrhea, nausea and vomiting.   Genitourinary: Negative for dysuria and hematuria.   Musculoskeletal: Negative for back pain, neck pain and neck stiffness.   Skin: Negative for rash.   Neurological: Negative for dizziness and weakness.       Objective:     /64 Comment: Manual  Pulse (!) 43 Comment: Manual  Temp 97.6 °F (36.4 °C) (Oral)   Ht 5' 5" (1.651 m)   Wt 75.8 kg (167 lb 3.2 oz)   BMI 27.82 kg/m²     Physical Exam   Constitutional: He is oriented to person, place, and time. He appears well-developed and well-nourished.   HENT:   Head: Normocephalic and atraumatic.   Right Ear: External ear normal.   Left Ear: External ear normal.   Nose: Nose normal.   Mouth/Throat: Oropharynx is clear and moist. No oropharyngeal exudate.   Eyes: Conjunctivae and EOM are normal. Pupils are equal, round, and reactive to light. Right eye exhibits no discharge. Left eye exhibits no discharge. No scleral icterus.   Neck: Normal range of motion. Neck supple. No JVD present. No thyromegaly present.   Cardiovascular: Regular rhythm, normal heart sounds and intact distal pulses.  Bradycardia present.  Exam reveals no gallop and no friction rub.    No murmur heard.  Pulmonary/Chest: Effort normal and breath sounds normal. No respiratory distress. He has no wheezes. He has no rales. He exhibits no tenderness.   Abdominal: " Soft. Bowel sounds are normal. He exhibits no distension and no mass. There is no tenderness. There is no rebound and no guarding.   Genitourinary: Rectal exam shows no mass and no tenderness. Prostate is enlarged. Prostate is not tender.   Musculoskeletal: Normal range of motion. He exhibits no edema or tenderness.   Lymphadenopathy:     He has no cervical adenopathy.   Neurological: He is alert and oriented to person, place, and time. No cranial nerve deficit. Coordination normal.   Skin: Skin is warm and dry. He is not diaphoretic.   Psychiatric: He has a normal mood and affect.     Assessment:     1. Essential hypertension    2. Benign prostatic hyperplasia, unspecified whether lower urinary tract symptoms present    3. Decreased GFR    4. Hyperlipidemia with target LDL less than 130    5. Encounter for abdominal aortic aneurysm (AAA) screening        Plan:     Problem List Items Addressed This Visit     BPH (benign prostatic hyperplasia)    Relevant Medications    tamsulosin (FLOMAX) 0.4 mg Cp24    Decreased GFR    Essential hypertension - Primary    Relevant Medications    lisinopril 10 MG tablet    Other Relevant Orders    Basic metabolic panel    Hyperlipidemia with target LDL less than 130    Relevant Orders    Lipid panel      Other Visit Diagnoses     Encounter for abdominal aortic aneurysm (AAA) screening        Relevant Orders    US Abdominal Aorta        No Follow-up on file.

## 2018-05-23 ENCOUNTER — TELEPHONE (OUTPATIENT)
Dept: RADIOLOGY | Facility: HOSPITAL | Age: 73
End: 2018-05-23

## 2018-05-24 ENCOUNTER — HOSPITAL ENCOUNTER (OUTPATIENT)
Dept: RADIOLOGY | Facility: HOSPITAL | Age: 73
Discharge: HOME | End: 2018-05-24
Attending: FAMILY MEDICINE
Payer: MEDICARE

## 2018-05-24 DIAGNOSIS — Z13.6 ENCOUNTER FOR ABDOMINAL AORTIC ANEURYSM (AAA) SCREENING: ICD-10-CM

## 2018-05-24 PROCEDURE — 76775 US EXAM ABDO BACK WALL LIM: CPT | Mod: TC,PO

## 2018-05-24 PROCEDURE — 76775 US EXAM ABDO BACK WALL LIM: CPT | Mod: 26,,, | Performed by: RADIOLOGY

## 2018-07-09 ENCOUNTER — PES CALL (OUTPATIENT)
Dept: ADMINISTRATIVE | Facility: CLINIC | Age: 73
End: 2018-07-09

## 2018-07-18 DIAGNOSIS — N40.0 BENIGN PROSTATIC HYPERPLASIA, UNSPECIFIED WHETHER LOWER URINARY TRACT SYMPTOMS PRESENT: ICD-10-CM

## 2018-07-18 DIAGNOSIS — I10 ESSENTIAL HYPERTENSION: ICD-10-CM

## 2018-07-18 RX ORDER — TAMSULOSIN HYDROCHLORIDE 0.4 MG/1
CAPSULE ORAL
Qty: 45 CAPSULE | Refills: 3 | Status: SHIPPED | OUTPATIENT
Start: 2018-07-18 | End: 2019-01-15 | Stop reason: SDUPTHER

## 2018-07-18 RX ORDER — LISINOPRIL 10 MG/1
TABLET ORAL
Qty: 45 TABLET | Refills: 3 | Status: SHIPPED | OUTPATIENT
Start: 2018-07-18 | End: 2019-01-15 | Stop reason: SDUPTHER

## 2019-01-15 DIAGNOSIS — I10 ESSENTIAL HYPERTENSION: ICD-10-CM

## 2019-01-15 DIAGNOSIS — N40.0 BENIGN PROSTATIC HYPERPLASIA, UNSPECIFIED WHETHER LOWER URINARY TRACT SYMPTOMS PRESENT: ICD-10-CM

## 2019-01-15 RX ORDER — LISINOPRIL 10 MG/1
10 TABLET ORAL EVERY OTHER DAY
Qty: 45 TABLET | Refills: 0 | Status: SHIPPED | OUTPATIENT
Start: 2019-01-15 | End: 2019-05-03 | Stop reason: SDUPTHER

## 2019-01-15 RX ORDER — TAMSULOSIN HYDROCHLORIDE 0.4 MG/1
CAPSULE ORAL
Qty: 45 CAPSULE | Refills: 0 | Status: SHIPPED | OUTPATIENT
Start: 2019-01-15 | End: 2019-07-22 | Stop reason: SDUPTHER

## 2019-01-15 NOTE — TELEPHONE ENCOUNTER
Health Maintenance Due   Topic Date Due    Zoster Vaccine  02/02/2004    Lipid Panel  02/06/2018     He is due for a lipid profile.  Check a cmp also.    CMP  Sodium   Date Value Ref Range Status   04/23/2018 140 136 - 145 mmol/L Final     Potassium   Date Value Ref Range Status   04/23/2018 5.0 3.5 - 5.1 mmol/L Final     Chloride   Date Value Ref Range Status   04/23/2018 106 95 - 110 mmol/L Final     CO2   Date Value Ref Range Status   04/23/2018 27 23 - 29 mmol/L Final     Glucose   Date Value Ref Range Status   04/23/2018 90 70 - 110 mg/dL Final     BUN, Bld   Date Value Ref Range Status   04/23/2018 22 8 - 23 mg/dL Final     Creatinine   Date Value Ref Range Status   04/23/2018 1.3 0.5 - 1.4 mg/dL Final     Calcium   Date Value Ref Range Status   04/23/2018 9.7 8.7 - 10.5 mg/dL Final     Total Protein   Date Value Ref Range Status   02/06/2017 7.1 6.0 - 8.4 g/dL Final     Albumin   Date Value Ref Range Status   04/23/2018 4.1 3.5 - 5.2 g/dL Final     Total Bilirubin   Date Value Ref Range Status   02/06/2017 0.6 0.1 - 1.0 mg/dL Final     Comment:     For infants and newborns, interpretation of results should be based  on gestational age, weight and in agreement with clinical  observations.  Premature Infant recommended reference ranges:  Up to 24 hours.............<8.0 mg/dL  Up to 48 hours............<12.0 mg/dL  3-5 days..................<15.0 mg/dL  6-29 days.................<15.0 mg/dL       Alkaline Phosphatase   Date Value Ref Range Status   02/06/2017 72 55 - 135 U/L Final     AST   Date Value Ref Range Status   02/06/2017 22 10 - 40 U/L Final     ALT   Date Value Ref Range Status   02/06/2017 17 10 - 44 U/L Final     Anion Gap   Date Value Ref Range Status   04/23/2018 7 (L) 8 - 16 mmol/L Final     eGFR if    Date Value Ref Range Status   04/23/2018 >60.0 >60 mL/min/1.73 m^2 Final     eGFR if non    Date Value Ref Range Status   04/23/2018 53.8 (A) >60 mL/min/1.73 m^2  Final     Comment:     Calculation used to obtain the estimated glomerular filtration  rate (eGFR) is the CKD-EPI equation.

## 2019-03-18 ENCOUNTER — PES CALL (OUTPATIENT)
Dept: ADMINISTRATIVE | Facility: CLINIC | Age: 74
End: 2019-03-18

## 2019-05-03 DIAGNOSIS — I10 ESSENTIAL HYPERTENSION: ICD-10-CM

## 2019-05-06 RX ORDER — LISINOPRIL 10 MG/1
TABLET ORAL
Qty: 45 TABLET | Refills: 0 | Status: SHIPPED | OUTPATIENT
Start: 2019-05-06 | End: 2019-07-22

## 2019-05-06 NOTE — TELEPHONE ENCOUNTER
I refilled the requested medication x 1 month.    The patient is due for an visit in the office.  Call the patient on the phone and book the patient with Nel Jacob NP for a visit.     PLEASE DOCUMENT THE FACT THAT YOU HAVE CONTACTED THE PATIENT IN THE CHART FOR FUTURE REFERENCE.    Health Maintenance Due   Topic Date Due    Lipid Panel  02/06/2018

## 2019-07-22 ENCOUNTER — OFFICE VISIT (OUTPATIENT)
Dept: FAMILY MEDICINE | Facility: CLINIC | Age: 74
End: 2019-07-22
Payer: MEDICARE

## 2019-07-22 VITALS
TEMPERATURE: 98 F | SYSTOLIC BLOOD PRESSURE: 138 MMHG | HEIGHT: 65 IN | WEIGHT: 165.81 LBS | DIASTOLIC BLOOD PRESSURE: 68 MMHG | HEART RATE: 42 BPM | BODY MASS INDEX: 27.63 KG/M2

## 2019-07-22 DIAGNOSIS — R00.1 BRADYCARDIA: ICD-10-CM

## 2019-07-22 DIAGNOSIS — N40.0 BENIGN PROSTATIC HYPERPLASIA, UNSPECIFIED WHETHER LOWER URINARY TRACT SYMPTOMS PRESENT: ICD-10-CM

## 2019-07-22 DIAGNOSIS — N18.30 CKD (CHRONIC KIDNEY DISEASE) STAGE 3, GFR 30-59 ML/MIN: ICD-10-CM

## 2019-07-22 DIAGNOSIS — I95.1 ORTHOSTASIS: Primary | ICD-10-CM

## 2019-07-22 PROCEDURE — 99214 PR OFFICE/OUTPT VISIT, EST, LEVL IV, 30-39 MIN: ICD-10-PCS | Mod: S$GLB,,, | Performed by: FAMILY MEDICINE

## 2019-07-22 PROCEDURE — 99499 UNLISTED E&M SERVICE: CPT | Mod: S$GLB,,, | Performed by: FAMILY MEDICINE

## 2019-07-22 PROCEDURE — 99499 RISK ADDL DX/OHS AUDIT: ICD-10-PCS | Mod: S$GLB,,, | Performed by: FAMILY MEDICINE

## 2019-07-22 PROCEDURE — 3078F PR MOST RECENT DIASTOLIC BLOOD PRESSURE < 80 MM HG: ICD-10-PCS | Mod: CPTII,S$GLB,, | Performed by: FAMILY MEDICINE

## 2019-07-22 PROCEDURE — 3075F PR MOST RECENT SYSTOLIC BLOOD PRESS GE 130-139MM HG: ICD-10-PCS | Mod: CPTII,S$GLB,, | Performed by: FAMILY MEDICINE

## 2019-07-22 PROCEDURE — 99999 PR PBB SHADOW E&M-EST. PATIENT-LVL III: ICD-10-PCS | Mod: PBBFAC,,, | Performed by: FAMILY MEDICINE

## 2019-07-22 PROCEDURE — 3078F DIAST BP <80 MM HG: CPT | Mod: CPTII,S$GLB,, | Performed by: FAMILY MEDICINE

## 2019-07-22 PROCEDURE — 1101F PR PT FALLS ASSESS DOC 0-1 FALLS W/OUT INJ PAST YR: ICD-10-PCS | Mod: CPTII,S$GLB,, | Performed by: FAMILY MEDICINE

## 2019-07-22 PROCEDURE — 99214 OFFICE O/P EST MOD 30 MIN: CPT | Mod: S$GLB,,, | Performed by: FAMILY MEDICINE

## 2019-07-22 PROCEDURE — 99999 PR PBB SHADOW E&M-EST. PATIENT-LVL III: CPT | Mod: PBBFAC,,, | Performed by: FAMILY MEDICINE

## 2019-07-22 PROCEDURE — 3075F SYST BP GE 130 - 139MM HG: CPT | Mod: CPTII,S$GLB,, | Performed by: FAMILY MEDICINE

## 2019-07-22 PROCEDURE — 1101F PT FALLS ASSESS-DOCD LE1/YR: CPT | Mod: CPTII,S$GLB,, | Performed by: FAMILY MEDICINE

## 2019-07-22 RX ORDER — TAMSULOSIN HYDROCHLORIDE 0.4 MG/1
CAPSULE ORAL
Qty: 45 CAPSULE | Refills: 3 | Status: SHIPPED | OUTPATIENT
Start: 2019-07-22 | End: 2019-08-14 | Stop reason: SDUPTHER

## 2019-07-22 RX ORDER — LISINOPRIL 5 MG/1
5 TABLET ORAL EVERY OTHER DAY
Qty: 45 TABLET | Refills: 3 | Status: SHIPPED | OUTPATIENT
Start: 2019-07-22 | End: 2019-08-14 | Stop reason: SDUPTHER

## 2019-07-22 NOTE — PROGRESS NOTES
Subjective:      Patient ID: Alexander Patel is a 74 y.o. male.    Chief Complaint: Dizziness (history of low pulse rate) and Rash    HPIhe has had light headedness for a while but it is worsening. He has been walking and he has had problems with walking.  Going up steps causes him to get light headed.  He had been driving at a time and he states that he stopped and got out of the car and he felt very light headed.  He works out in the yard some and he states that he works 2 days a week and sweats. He drinks a lot of fluids to help prevent problems.  He alternates taking tamsulosin qod and the lisinopril qod.      Health Maintenance Due   Topic Date Due    Lipid Panel  02/06/2018       Problem List Items Addressed This Visit     BPH (benign prostatic hyperplasia)    Overview     He has BPH and this has been stable.  He has to get up to the restroom once a night on average. The tamsulosin has helped.           Relevant Medications    tamsulosin (FLOMAX) 0.4 mg Cap    Bradycardia    Relevant Medications    lisinopril (PRINIVIL,ZESTRIL) 5 MG tablet    Other Relevant Orders    MyChart Patient Entered Blood Pressure    MyChart Patient Entered Pulse    Holter monitor - 24 hour    Ambulatory referral to Cardiology    CBC auto differential    Comprehensive metabolic panel    TSH    CKD (chronic kidney disease) stage 3, GFR 30-59 ml/min    Overview     Alexander Patel has an Estimated Glumerular Filtration Rate (EGFR) between 30 and 59 consistent with the definition of chronic kidney disease stage 3.    eGFR if non    Date Value Ref Range Status   04/23/2018 53.8 (A) >60 mL/min/1.73 m^2 Final     Comment:     Calculation used to obtain the estimated glomerular filtration  rate (eGFR) is the CKD-EPI equation.            Lab Results   Component Value Date    CREATININE 1.3 04/23/2018    BUN 22 04/23/2018     04/23/2018    K 5.0 04/23/2018     04/23/2018    CO2 27 04/23/2018       The patient's  chronic kidney disease stage 3 was monitored, evaluated, addressed and/or treated.               Other Visit Diagnoses     Orthostasis    -  Primary    Relevant Medications    lisinopril (PRINIVIL,ZESTRIL) 5 MG tablet    Other Relevant Orders    MyChart Patient Entered Blood Pressure    MyChart Patient Entered Pulse    Holter monitor - 24 hour    Ambulatory referral to Cardiology    CBC auto differential    Comprehensive metabolic panel    TSH            Past Medical History:  Past Medical History:   Diagnosis Date    BPH (benign prostatic hyperplasia)     Essential hypertension 8/28/2015    Gout 2012    Hyperlipidemia LDL goal < 130      Past Surgical History:   Procedure Laterality Date    COLONOSCOPY N/A 10/8/2015    [facility]    FRACTURE SURGERY       Review of patient's allergies indicates:   Allergen Reactions    No known drug allergies      Current Outpatient Medications on File Prior to Visit   Medication Sig Dispense Refill    aspirin 81 MG chewable tablet Every day      GLUCOSAMINE HCL/CHONDR LAM A NA (OSTEO BI-FLEX ORAL) Take 1 tablet by mouth once daily.       tamsulosin (FLOMAX) 0.4 mg Cap TAKE 1 CAPSULE EVERY OTHER DAY. 45 capsule 0    [DISCONTINUED] lisinopril 10 MG tablet TAKE 1 TABLET EVERY OTHER  DAY 45 tablet 0     No current facility-administered medications on file prior to visit.      Social History     Socioeconomic History    Marital status:      Spouse name: Modesta    Number of children: 2    Years of education: Not on file    Highest education level: Not on file   Occupational History    Occupation: retired   Social Needs    Financial resource strain: Not on file    Food insecurity:     Worry: Not on file     Inability: Not on file    Transportation needs:     Medical: Not on file     Non-medical: Not on file   Tobacco Use    Smoking status: Never Smoker    Smokeless tobacco: Former User     Types: Chew   Substance and Sexual  "Activity    Alcohol use: Yes     Comment: occasional    Drug use: No    Sexual activity: Yes   Lifestyle    Physical activity:     Days per week: Not on file     Minutes per session: Not on file    Stress: Not on file   Relationships    Social connections:     Talks on phone: Not on file     Gets together: Not on file     Attends Sikhism service: Not on file     Active member of club or organization: Not on file     Attends meetings of clubs or organizations: Not on file     Relationship status: Not on file   Other Topics Concern    Not on file   Social History Narrative    Not on file     Family History   Problem Relation Age of Onset    Heart disease Mother     Alcohol abuse Father     Emphysema Father              Review of Systems   Constitutional: Negative.  Negative for chills, diaphoresis and fever.   HENT: Negative for congestion, hearing loss, mouth sores, postnasal drip and sore throat.    Eyes: Negative for pain and visual disturbance.   Respiratory: Negative for cough, chest tightness, shortness of breath and wheezing.    Cardiovascular: Negative for chest pain.   Gastrointestinal: Negative for abdominal pain, anal bleeding, blood in stool, constipation, diarrhea, nausea and vomiting.   Genitourinary: Negative for dysuria and hematuria.   Musculoskeletal: Negative for back pain, neck pain and neck stiffness.   Skin: Negative for rash.   Neurological: Positive for dizziness and weakness.       Objective:   BP (!) 103/53   Pulse (!) 42   Temp 97.6 °F (36.4 °C) (Oral)   Ht 5' 5" (1.651 m)   Wt 75.2 kg (165 lb 12.8 oz)   BMI 27.59 kg/m²   The bp did not change with sitting or standing.      Physical Exam   Constitutional: He is oriented to person, place, and time. He appears well-developed and well-nourished.   HENT:   Head: Normocephalic and atraumatic.   Right Ear: External ear normal.   Left Ear: External ear normal.   Nose: Nose normal.   Mouth/Throat: Oropharynx is clear and moist. No " oropharyngeal exudate.   Eyes: Pupils are equal, round, and reactive to light. Conjunctivae and EOM are normal. Right eye exhibits no discharge. Left eye exhibits no discharge. No scleral icterus.   Neck: Normal range of motion. Neck supple. No JVD present. No thyromegaly present.   Cardiovascular: Regular rhythm, normal heart sounds and intact distal pulses. Bradycardia present. Exam reveals no gallop and no friction rub.   No murmur heard.  Pulmonary/Chest: Effort normal and breath sounds normal. No respiratory distress. He has no wheezes. He has no rales. He exhibits no tenderness.   Abdominal: Soft. Bowel sounds are normal. He exhibits no distension and no mass. There is no tenderness. There is no rebound and no guarding.   Musculoskeletal: Normal range of motion. He exhibits no edema or tenderness.   Lymphadenopathy:     He has no cervical adenopathy.   Neurological: He is alert and oriented to person, place, and time. No cranial nerve deficit. Coordination normal.   Skin: Skin is warm and dry. He is not diaphoretic.   Psychiatric: He has a normal mood and affect.       Assessment:     1. Orthostasis    2. Bradycardia    3. Benign prostatic hyperplasia, unspecified whether lower urinary tract symptoms present    4. CKD (chronic kidney disease) stage 3, GFR 30-59 ml/min        Plan:   Alexander was seen today for dizziness and rash.    Diagnoses and all orders for this visit:    Orthostasis  -     [facility] Patient Entered Blood Pressure  -     [facility] Patient Entered Pulse  -     lisinopril (PRINIVIL,ZESTRIL) 5 MG tablet; Take 1 tablet (5 mg total) by mouth every other day.  -     Holter monitor - 24 hour; Future  -     Ambulatory referral to Cardiology  -     CBC auto differential; Future  -     Comprehensive metabolic panel; Future  -     TSH; Future    Bradycardia  -     [facility] Patient Entered Blood Pressure  -     [facility] Patient Entered Pulse  -     lisinopril (PRINIVIL,ZESTRIL) 5 MG tablet; Take 1 tablet (5  mg total) by mouth every other day.  -     Holter monitor - 24 hour; Future  -     Ambulatory referral to Cardiology  -     CBC auto differential; Future  -     Comprehensive metabolic panel; Future  -     TSH; Future    Benign prostatic hyperplasia, unspecified whether lower urinary tract symptoms present  -     tamsulosin (FLOMAX) 0.4 mg Cap; TAKE 1 CAPSULE EVERY OTHER DAY.    CKD (chronic kidney disease) stage 3, GFR 30-59 ml/min     Monitor for the kidneys function over time.  A CMP will be checked above.

## 2019-07-26 ENCOUNTER — LAB VISIT (OUTPATIENT)
Dept: LAB | Facility: HOSPITAL | Age: 74
End: 2019-07-26
Attending: FAMILY MEDICINE
Payer: MEDICARE

## 2019-07-26 DIAGNOSIS — R00.1 BRADYCARDIA: ICD-10-CM

## 2019-07-26 DIAGNOSIS — I95.1 ORTHOSTASIS: ICD-10-CM

## 2019-07-26 LAB
ALBUMIN SERPL BCP-MCNC: 3.9 G/DL (ref 3.5–5.2)
ALP SERPL-CCNC: 76 U/L (ref 55–135)
ALT SERPL W/O P-5'-P-CCNC: 11 U/L (ref 10–44)
ANION GAP SERPL CALC-SCNC: 8 MMOL/L (ref 8–16)
AST SERPL-CCNC: 20 U/L (ref 10–40)
BASOPHILS # BLD AUTO: 0.04 K/UL (ref 0–0.2)
BASOPHILS NFR BLD: 0.6 % (ref 0–1.9)
BILIRUB SERPL-MCNC: 0.3 MG/DL (ref 0.1–1)
BUN SERPL-MCNC: 19 MG/DL (ref 8–23)
CALCIUM SERPL-MCNC: 9.7 MG/DL (ref 8.7–10.5)
CHLORIDE SERPL-SCNC: 108 MMOL/L (ref 95–110)
CO2 SERPL-SCNC: 25 MMOL/L (ref 23–29)
CREAT SERPL-MCNC: 1.5 MG/DL (ref 0.5–1.4)
DIFFERENTIAL METHOD: ABNORMAL
EOSINOPHIL # BLD AUTO: 0.1 K/UL (ref 0–0.5)
EOSINOPHIL NFR BLD: 2.1 % (ref 0–8)
ERYTHROCYTE [DISTWIDTH] IN BLOOD BY AUTOMATED COUNT: 12.9 % (ref 11.5–14.5)
EST. GFR  (AFRICAN AMERICAN): 51.9 ML/MIN/1.73 M^2
EST. GFR  (NON AFRICAN AMERICAN): 44.9 ML/MIN/1.73 M^2
GLUCOSE SERPL-MCNC: 101 MG/DL (ref 70–110)
HCT VFR BLD AUTO: 38.4 % (ref 40–54)
HGB BLD-MCNC: 12.7 G/DL (ref 14–18)
IMM GRANULOCYTES # BLD AUTO: 0.03 K/UL (ref 0–0.04)
IMM GRANULOCYTES NFR BLD AUTO: 0.4 % (ref 0–0.5)
LYMPHOCYTES # BLD AUTO: 1.6 K/UL (ref 1–4.8)
LYMPHOCYTES NFR BLD: 23 % (ref 18–48)
MCH RBC QN AUTO: 30.5 PG (ref 27–31)
MCHC RBC AUTO-ENTMCNC: 33.1 G/DL (ref 32–36)
MCV RBC AUTO: 92 FL (ref 82–98)
MONOCYTES # BLD AUTO: 0.6 K/UL (ref 0.3–1)
MONOCYTES NFR BLD: 9 % (ref 4–15)
NEUTROPHILS # BLD AUTO: 4.4 K/UL (ref 1.8–7.7)
NEUTROPHILS NFR BLD: 64.9 % (ref 38–73)
NRBC BLD-RTO: 0 /100 WBC
PLATELET # BLD AUTO: 130 K/UL (ref 150–350)
PMV BLD AUTO: 11.7 FL (ref 9.2–12.9)
POTASSIUM SERPL-SCNC: 4.3 MMOL/L (ref 3.5–5.1)
PROT SERPL-MCNC: 6.8 G/DL (ref 6–8.4)
RBC # BLD AUTO: 4.16 M/UL (ref 4.6–6.2)
SODIUM SERPL-SCNC: 141 MMOL/L (ref 136–145)
TSH SERPL DL<=0.005 MIU/L-ACNC: 0.72 UIU/ML (ref 0.4–4)
WBC # BLD AUTO: 6.75 K/UL (ref 3.9–12.7)

## 2019-07-26 PROCEDURE — 80053 COMPREHEN METABOLIC PANEL: CPT

## 2019-07-26 PROCEDURE — 36415 COLL VENOUS BLD VENIPUNCTURE: CPT | Mod: PO

## 2019-07-26 PROCEDURE — 84443 ASSAY THYROID STIM HORMONE: CPT

## 2019-07-26 PROCEDURE — 85025 COMPLETE CBC W/AUTO DIFF WBC: CPT

## 2019-07-31 ENCOUNTER — CLINICAL SUPPORT (OUTPATIENT)
Dept: CARDIOLOGY | Facility: CLINIC | Age: 74
End: 2019-07-31
Attending: FAMILY MEDICINE
Payer: MEDICARE

## 2019-07-31 DIAGNOSIS — R00.1 BRADYCARDIA: ICD-10-CM

## 2019-07-31 DIAGNOSIS — I95.1 ORTHOSTASIS: ICD-10-CM

## 2019-07-31 PROCEDURE — 93224 HOLTER MONITOR - 24 HOUR: ICD-10-PCS | Mod: S$GLB,,, | Performed by: INTERNAL MEDICINE

## 2019-07-31 PROCEDURE — 93224 XTRNL ECG REC UP TO 48 HRS: CPT | Mod: S$GLB,,, | Performed by: INTERNAL MEDICINE

## 2019-07-31 NOTE — PROGRESS NOTES
Alexander, I am happy to say that your thyroid function and all of your electrolytes appear to be within normal limits of what you have had in the past.  Your kidney function is a little low but this is stable compared to the past.  You were mildly anemic but again this is comparable to wear you have been in the past.  Your platelet count was minimally low so I would like to recheck a CBC in 3 months to confirm that this is stable.  Platelets are what help make you clot and if they are extremely low, it can inhibit your clotting formation.  You are no where near that level at this point.

## 2019-08-02 NOTE — PROGRESS NOTES
Pat, this patient's pulse is going as low as 31.  Would you see him earlier than is booked on 8/12?  Avinash

## 2019-08-06 NOTE — PROGRESS NOTES
The holter shows the pulse going to the low 30's.  I have alerted Dr. Gutierrez about this and he states that it appears similar to the one you had in the past.  He will see you as scheduled.

## 2019-08-12 ENCOUNTER — OFFICE VISIT (OUTPATIENT)
Dept: CARDIOLOGY | Facility: CLINIC | Age: 74
End: 2019-08-12
Payer: MEDICARE

## 2019-08-12 VITALS
DIASTOLIC BLOOD PRESSURE: 65 MMHG | SYSTOLIC BLOOD PRESSURE: 121 MMHG | WEIGHT: 163.5 LBS | HEIGHT: 65 IN | BODY MASS INDEX: 27.24 KG/M2 | HEART RATE: 41 BPM

## 2019-08-12 DIAGNOSIS — R00.1 BRADYCARDIA: Primary | ICD-10-CM

## 2019-08-12 DIAGNOSIS — R53.83 FATIGUE, UNSPECIFIED TYPE: ICD-10-CM

## 2019-08-12 DIAGNOSIS — R00.1 SINUS BRADYCARDIA, CHRONIC: ICD-10-CM

## 2019-08-12 DIAGNOSIS — I10 ESSENTIAL HYPERTENSION: ICD-10-CM

## 2019-08-12 PROCEDURE — 3074F SYST BP LT 130 MM HG: CPT | Mod: CPTII,S$GLB,, | Performed by: INTERNAL MEDICINE

## 2019-08-12 PROCEDURE — 99214 OFFICE O/P EST MOD 30 MIN: CPT | Mod: S$GLB,,, | Performed by: INTERNAL MEDICINE

## 2019-08-12 PROCEDURE — 99214 PR OFFICE/OUTPT VISIT, EST, LEVL IV, 30-39 MIN: ICD-10-PCS | Mod: S$GLB,,, | Performed by: INTERNAL MEDICINE

## 2019-08-12 PROCEDURE — 3074F PR MOST RECENT SYSTOLIC BLOOD PRESSURE < 130 MM HG: ICD-10-PCS | Mod: CPTII,S$GLB,, | Performed by: INTERNAL MEDICINE

## 2019-08-12 PROCEDURE — 1101F PT FALLS ASSESS-DOCD LE1/YR: CPT | Mod: CPTII,S$GLB,, | Performed by: INTERNAL MEDICINE

## 2019-08-12 PROCEDURE — 99999 PR PBB SHADOW E&M-EST. PATIENT-LVL III: ICD-10-PCS | Mod: PBBFAC,,, | Performed by: INTERNAL MEDICINE

## 2019-08-12 PROCEDURE — 1101F PR PT FALLS ASSESS DOC 0-1 FALLS W/OUT INJ PAST YR: ICD-10-PCS | Mod: CPTII,S$GLB,, | Performed by: INTERNAL MEDICINE

## 2019-08-12 PROCEDURE — 3078F PR MOST RECENT DIASTOLIC BLOOD PRESSURE < 80 MM HG: ICD-10-PCS | Mod: CPTII,S$GLB,, | Performed by: INTERNAL MEDICINE

## 2019-08-12 PROCEDURE — 99999 PR PBB SHADOW E&M-EST. PATIENT-LVL III: CPT | Mod: PBBFAC,,, | Performed by: INTERNAL MEDICINE

## 2019-08-12 PROCEDURE — 3078F DIAST BP <80 MM HG: CPT | Mod: CPTII,S$GLB,, | Performed by: INTERNAL MEDICINE

## 2019-08-12 NOTE — LETTER
August 12, 2019      Avinash Lamb MD  [address]  [place] [address]           [facility]  [address]  [place] [address]  Phone: [phone]  Fax: [phone]          Patient: Alexander Patel   MR Number: [id number]   YOB: 1945   Date of Visit: 8/12/2019       Dear Dr. Avinash Lamb:    Thank you for referring Alexander Patel to me for evaluation. Attached you will find relevant portions of my assessment and plan of care.    If you have questions, please do not hesitate to call me. I look forward to following Alexander Patel along with you.    Sincerely,    Erich Gutierrez Jr., MD    Enclosure  CC:  No Recipients    If you would like to receive this communication electronically, please contact [email or web address] or [phone] to request more information on [employer] access.    For providers and/or their staff who would like to refer a patient to [facility], please contact us through our one-stop-shop provider referral line, [facility], at [phone].    If you feel you have received this communication in error or would no longer like to receive these types of communications, please e-mail [email or web address]

## 2019-08-12 NOTE — PROGRESS NOTES
Subjective:    Patient ID:  Alexander Patel is a 74 y.o. male who presents for evaluation of Consult (ref from PCP) and Bradycardia      HPI74 yo WM who has had sinus bradycardia for years. Complains of fatigue , URENA and occasional light headiness. Has only had one syncopal episode and this happened in 2017. Had recent holter with marked bradycardia, pauses of 2.5 seconds and occasional Mobitz II second degree AV block. Maximum HR was 67.     Review of Systems   Constitution: Positive for malaise/fatigue. Negative for decreased appetite, fever, weight gain and weight loss.   HENT: Positive for hearing loss. Negative for nosebleeds.    Eyes: Negative for visual disturbance.   Cardiovascular: Positive for dyspnea on exertion. Negative for chest pain, claudication, cyanosis, irregular heartbeat, leg swelling, near-syncope, orthopnea, palpitations, paroxysmal nocturnal dyspnea and syncope.   Respiratory: Positive for shortness of breath. Negative for cough, hemoptysis, sleep disturbances due to breathing, snoring and wheezing.    Endocrine: Negative for cold intolerance, heat intolerance, polydipsia and polyuria.   Hematologic/Lymphatic: Negative for adenopathy and bleeding problem. Does not bruise/bleed easily.   Skin: Negative for color change, itching, poor wound healing, rash and suspicious lesions.   Musculoskeletal: Negative for arthritis, back pain, falls, joint pain, joint swelling, muscle cramps, muscle weakness and myalgias.   Gastrointestinal: Negative for bloating, abdominal pain, change in bowel habit, constipation, flatus, heartburn, hematemesis, hematochezia, hemorrhoids, jaundice, melena, nausea and vomiting.   Genitourinary: Negative for bladder incontinence, decreased libido, frequency, hematuria, hesitancy and urgency.   Neurological: Positive for dizziness and light-headedness. Negative for brief paralysis, difficulty with concentration, excessive daytime sleepiness, focal weakness, headaches, loss of  "balance, numbness, vertigo and weakness.   Psychiatric/Behavioral: Negative for altered mental status, depression and memory loss. The patient does not have insomnia and is not nervous/anxious.    Allergic/Immunologic: Negative for environmental allergies, hives and persistent infections.        Objective:    Physical Exam   Constitutional: He is oriented to person, place, and time. He appears well-developed and well-nourished. No distress.   /65   Pulse (!) 41   Ht 5' 5" (1.651 m)   Wt 74.2 kg (163 lb 8 oz)   BMI 27.21 kg/m²      HENT:   Head: Normocephalic and atraumatic.   Eyes: Pupils are equal, round, and reactive to light. Conjunctivae and lids are normal. Right eye exhibits no discharge. No scleral icterus.   Neck: Normal range of motion. Neck supple. No JVD present. No tracheal deviation present. No thyromegaly present.   Cardiovascular: Regular rhythm, S1 normal, S2 normal, normal heart sounds and intact distal pulses. Bradycardia present. Exam reveals no gallop and no friction rub.   No murmur heard.  Pulses:       Carotid pulses are 2+ on the right side, and 2+ on the left side.       Radial pulses are 2+ on the right side, and 2+ on the left side.        Femoral pulses are 2+ on the right side, and 2+ on the left side.       Popliteal pulses are 2+ on the right side, and 2+ on the left side.        Dorsalis pedis pulses are 2+ on the right side, and 2+ on the left side.        Posterior tibial pulses are 2+ on the right side, and 2+ on the left side.   Pulmonary/Chest: Effort normal and breath sounds normal. No respiratory distress. He has no wheezes. He has no rales. He exhibits no tenderness.   Abdominal: Soft. Bowel sounds are normal. He exhibits no distension and no mass. There is no hepatosplenomegaly or hepatomegaly. There is no tenderness. There is no rebound and no guarding.   Musculoskeletal: Normal range of motion. He exhibits no edema or tenderness.   Lymphadenopathy:     He has no " cervical adenopathy.   Neurological: He is alert and oriented to person, place, and time. He has normal reflexes. No cranial nerve deficit. Coordination normal.   Skin: Skin is warm and dry. No rash noted. He is not diaphoretic. No erythema. No pallor.   Psychiatric: He has a normal mood and affect. His speech is normal and behavior is normal. Judgment and thought content normal. Cognition and memory are normal.         Assessment:       1. Bradycardia    2. Essential hypertension    3. Sinus bradycardia, chronic    4. Fatigue, unspecified type         Plan:     Feel patient has significant chronotropic incompetence and would benefit from PPM. Discussed this with patient and wife and agreeable. Patient wishes to proceed at [facility].  Schedule PPM   No orders of the defined types were placed in this encounter.    Follow up in about 3 months (around 11/12/2019).

## 2019-08-14 DIAGNOSIS — R00.1 BRADYCARDIA: ICD-10-CM

## 2019-08-14 DIAGNOSIS — I95.1 ORTHOSTASIS: ICD-10-CM

## 2019-08-14 DIAGNOSIS — N40.0 BENIGN PROSTATIC HYPERPLASIA, UNSPECIFIED WHETHER LOWER URINARY TRACT SYMPTOMS PRESENT: ICD-10-CM

## 2019-08-14 RX ORDER — LISINOPRIL 5 MG/1
5 TABLET ORAL EVERY OTHER DAY
Qty: 45 TABLET | Refills: 3 | Status: ON HOLD | OUTPATIENT
Start: 2019-08-14 | End: 2019-08-23 | Stop reason: SDUPTHER

## 2019-08-14 RX ORDER — TAMSULOSIN HYDROCHLORIDE 0.4 MG/1
CAPSULE ORAL
Qty: 45 CAPSULE | Refills: 3 | Status: SHIPPED | OUTPATIENT
Start: 2019-08-14 | End: 2020-02-21 | Stop reason: SDUPTHER

## 2019-08-14 NOTE — TELEPHONE ENCOUNTER
----- Message from Dawood Mark sent at 8/14/2019 10:53 AM CDT -----  ..Type:  Patient Returning Call    Who Called: Modesta ( pt wife )   Who Left Message for Patient  Does the patient know what this is regarding?: please prescription [facility]   Would the patient rather a call back or a response via [facility]? Call back   Best Call Back Number: [phone]  Additional Information: Modesta ( pt wife ) is requesting  prescription be sent to [facility] Tamsulosin and Lisinopril ( pt prescription was sent to wrong pharmacy

## 2019-08-16 ENCOUNTER — TELEPHONE (OUTPATIENT)
Dept: CARDIOLOGY | Facility: CLINIC | Age: 74
End: 2019-08-16

## 2019-08-16 DIAGNOSIS — I44.1 SECOND DEGREE HEART BLOCK: Primary | ICD-10-CM

## 2019-08-16 DIAGNOSIS — R00.1 BRADYCARDIA: ICD-10-CM

## 2019-08-16 NOTE — TELEPHONE ENCOUNTER
Message given to heike so order can be placed, and she will contact patient. ----- Message from Modesta Méndez sent at 8/16/2019 10:49 AM CDT -----  Contact: Patients' wife Modesta  Patient & wife Modesta are  inquiring about procedure for Pacemaker. Please call [phone] to advise.  Tanja/YUNIER

## 2019-08-16 NOTE — TELEPHONE ENCOUNTER
Spoke to patient wife and advised that procedure has been scheduled for 8/23 at 11am. Patient wife verbalized understanding and is aware of procedure time, date, and instructions.

## 2019-08-23 DIAGNOSIS — Z95.0 CARDIAC PACEMAKER IN SITU: Primary | ICD-10-CM

## 2019-08-23 DIAGNOSIS — I44.1 SECOND DEGREE HEART BLOCK: ICD-10-CM

## 2019-08-26 ENCOUNTER — TELEPHONE (OUTPATIENT)
Dept: CARDIOLOGY | Facility: CLINIC | Age: 74
End: 2019-08-26

## 2019-08-26 NOTE — TELEPHONE ENCOUNTER
----- Message from Venus Saleh sent at 8/26/2019  9:50 AM CDT -----    Pt  Wife j carlos  Is calling to  Book a  Hospital   Follow up on   Sept 6 // please call  For details [phone]

## 2019-08-28 ENCOUNTER — PATIENT MESSAGE (OUTPATIENT)
Dept: CARDIOLOGY | Facility: CLINIC | Age: 74
End: 2019-08-28

## 2019-08-30 NOTE — TELEPHONE ENCOUNTER
Tell patient swelling and warm to touch in one foot is not usually the heart. If can't see his primary see and urgent care facility to be sure he does not have an infection

## 2019-09-03 ENCOUNTER — CLINICAL SUPPORT (OUTPATIENT)
Dept: CARDIOLOGY | Facility: CLINIC | Age: 74
End: 2019-09-03
Payer: MEDICARE

## 2019-09-03 ENCOUNTER — TELEPHONE (OUTPATIENT)
Dept: CARDIOLOGY | Facility: CLINIC | Age: 74
End: 2019-09-03

## 2019-09-03 DIAGNOSIS — Z95.0 CARDIAC PACEMAKER IN SITU: ICD-10-CM

## 2019-09-03 DIAGNOSIS — I44.1 SECOND DEGREE HEART BLOCK: ICD-10-CM

## 2019-09-03 RX ORDER — LISINOPRIL 5 MG/1
5 TABLET ORAL DAILY
COMMUNITY
End: 2020-02-21 | Stop reason: SDUPTHER

## 2019-09-03 NOTE — TELEPHONE ENCOUNTER
Pt recently had PPM implanted, wife states they were told to increase Lisinopril from 5 mg PO every other day to Lisinopril 10 mg daily.  She has a log of patients BP since procedure.  Pt states he wishes to be followed by Dr. Meade since he implanted the PPM.      Discussed med dose and BP log with Dr. Meade, new orders to take Lisinopril 5 mg PO daily. Wife and patient notified of MD orders, verbalized understanding, stated they do not need medication called in.  Six week post PPM implant appt scheduled with MD and pacemaker clinic.  Med profile updated.

## 2019-10-09 ENCOUNTER — OFFICE VISIT (OUTPATIENT)
Dept: CARDIOLOGY | Facility: CLINIC | Age: 74
End: 2019-10-09
Payer: MEDICARE

## 2019-10-09 ENCOUNTER — CLINICAL SUPPORT (OUTPATIENT)
Dept: CARDIOLOGY | Facility: CLINIC | Age: 74
End: 2019-10-09
Attending: INTERNAL MEDICINE
Payer: MEDICARE

## 2019-10-09 VITALS
HEIGHT: 65 IN | DIASTOLIC BLOOD PRESSURE: 72 MMHG | HEART RATE: 60 BPM | SYSTOLIC BLOOD PRESSURE: 117 MMHG | BODY MASS INDEX: 27.47 KG/M2 | WEIGHT: 164.88 LBS

## 2019-10-09 DIAGNOSIS — I44.1 SECOND DEGREE HEART BLOCK: ICD-10-CM

## 2019-10-09 DIAGNOSIS — I10 ESSENTIAL HYPERTENSION: ICD-10-CM

## 2019-10-09 DIAGNOSIS — R00.1 SINUS BRADYCARDIA, CHRONIC: Primary | ICD-10-CM

## 2019-10-09 DIAGNOSIS — Z95.0 CARDIAC PACEMAKER IN SITU: ICD-10-CM

## 2019-10-09 PROCEDURE — 99999 PR PBB SHADOW E&M-EST. PATIENT-LVL III: CPT | Mod: PBBFAC,,, | Performed by: INTERNAL MEDICINE

## 2019-10-09 PROCEDURE — 3074F PR MOST RECENT SYSTOLIC BLOOD PRESSURE < 130 MM HG: ICD-10-PCS | Mod: CPTII,S$GLB,, | Performed by: INTERNAL MEDICINE

## 2019-10-09 PROCEDURE — 1101F PT FALLS ASSESS-DOCD LE1/YR: CPT | Mod: CPTII,S$GLB,, | Performed by: INTERNAL MEDICINE

## 2019-10-09 PROCEDURE — 99024 PR POST-OP FOLLOW-UP VISIT: ICD-10-PCS | Mod: S$GLB,,, | Performed by: INTERNAL MEDICINE

## 2019-10-09 PROCEDURE — 3078F DIAST BP <80 MM HG: CPT | Mod: CPTII,S$GLB,, | Performed by: INTERNAL MEDICINE

## 2019-10-09 PROCEDURE — 99499 RISK ADDL DX/OHS AUDIT: ICD-10-PCS | Mod: S$GLB,,, | Performed by: INTERNAL MEDICINE

## 2019-10-09 PROCEDURE — 3078F PR MOST RECENT DIASTOLIC BLOOD PRESSURE < 80 MM HG: ICD-10-PCS | Mod: CPTII,S$GLB,, | Performed by: INTERNAL MEDICINE

## 2019-10-09 PROCEDURE — 99499 UNLISTED E&M SERVICE: CPT | Mod: S$GLB,,, | Performed by: INTERNAL MEDICINE

## 2019-10-09 PROCEDURE — 3074F SYST BP LT 130 MM HG: CPT | Mod: CPTII,S$GLB,, | Performed by: INTERNAL MEDICINE

## 2019-10-09 PROCEDURE — 99999 PR PBB SHADOW E&M-EST. PATIENT-LVL III: ICD-10-PCS | Mod: PBBFAC,,, | Performed by: INTERNAL MEDICINE

## 2019-10-09 PROCEDURE — 99024 POSTOP FOLLOW-UP VISIT: CPT | Mod: S$GLB,,, | Performed by: INTERNAL MEDICINE

## 2019-10-09 PROCEDURE — 1101F PR PT FALLS ASSESS DOC 0-1 FALLS W/OUT INJ PAST YR: ICD-10-PCS | Mod: CPTII,S$GLB,, | Performed by: INTERNAL MEDICINE

## 2019-10-09 NOTE — PROGRESS NOTES
Subjective:    Patient ID:  Alexander Patel is a 74 y.o. male who presents for follow-up of SSS    HPI  He comes for follow up with no major problems, no chest pain, no shortness of breath.  Had PPM  Last month for SSS    Review of Systems   Constitution: Negative for decreased appetite, malaise/fatigue, weight gain and weight loss.   Cardiovascular: Negative for chest pain, dyspnea on exertion, leg swelling, palpitations and syncope.   Respiratory: Negative for cough and shortness of breath.    Gastrointestinal: Negative.    Neurological: Negative for weakness.   All other systems reviewed and are negative.       Objective:      Physical Exam   Constitutional: He is oriented to person, place, and time. He appears well-developed and well-nourished.   HENT:   Head: Normocephalic.   Eyes: Pupils are equal, round, and reactive to light.   Neck: Normal range of motion. Neck supple. No JVD present. Carotid bruit is not present. No thyromegaly present.   Cardiovascular: Normal rate, regular rhythm, normal heart sounds, intact distal pulses and normal pulses. PMI is not displaced. Exam reveals no gallop.   No murmur heard.  Pulmonary/Chest: Effort normal and breath sounds normal.   Abdominal: Soft. Normal appearance. He exhibits no mass. There is no hepatosplenomegaly. There is no tenderness.   Musculoskeletal: Normal range of motion. He exhibits no edema.   Neurological: He is alert and oriented to person, place, and time. He has normal strength and normal reflexes. No sensory deficit.   Skin: Skin is warm and intact.   Psychiatric: He has a normal mood and affect.   Nursing note and vitals reviewed.        Assessment:       1. Sinus bradycardia, chronic    2. Essential hypertension    3. Second degree heart block         Plan:     Continue all cardiac medications  Regular exercise program  1 yr f/u        
Orthopedic

## 2020-02-21 ENCOUNTER — OFFICE VISIT (OUTPATIENT)
Dept: FAMILY MEDICINE | Facility: CLINIC | Age: 75
End: 2020-02-21
Payer: MEDICARE

## 2020-02-21 VITALS
WEIGHT: 168 LBS | HEART RATE: 60 BPM | DIASTOLIC BLOOD PRESSURE: 75 MMHG | TEMPERATURE: 98 F | BODY MASS INDEX: 28.68 KG/M2 | SYSTOLIC BLOOD PRESSURE: 121 MMHG | HEIGHT: 64 IN

## 2020-02-21 DIAGNOSIS — N18.30 CKD (CHRONIC KIDNEY DISEASE) STAGE 3, GFR 30-59 ML/MIN: ICD-10-CM

## 2020-02-21 DIAGNOSIS — E78.5 HYPERLIPIDEMIA WITH TARGET LDL LESS THAN 130: ICD-10-CM

## 2020-02-21 DIAGNOSIS — R00.1 BRADYCARDIA: ICD-10-CM

## 2020-02-21 DIAGNOSIS — I49.5 SICK SINUS SYNDROME: ICD-10-CM

## 2020-02-21 DIAGNOSIS — N40.0 BENIGN PROSTATIC HYPERPLASIA, UNSPECIFIED WHETHER LOWER URINARY TRACT SYMPTOMS PRESENT: ICD-10-CM

## 2020-02-21 DIAGNOSIS — Z12.5 ENCOUNTER FOR PROSTATE CANCER SCREENING: Primary | ICD-10-CM

## 2020-02-21 DIAGNOSIS — I10 ESSENTIAL HYPERTENSION: ICD-10-CM

## 2020-02-21 PROCEDURE — 3074F SYST BP LT 130 MM HG: CPT | Mod: CPTII,S$GLB,, | Performed by: FAMILY MEDICINE

## 2020-02-21 PROCEDURE — 1101F PR PT FALLS ASSESS DOC 0-1 FALLS W/OUT INJ PAST YR: ICD-10-PCS | Mod: CPTII,S$GLB,, | Performed by: FAMILY MEDICINE

## 2020-02-21 PROCEDURE — 1159F MED LIST DOCD IN RCRD: CPT | Mod: S$GLB,,, | Performed by: FAMILY MEDICINE

## 2020-02-21 PROCEDURE — 1101F PT FALLS ASSESS-DOCD LE1/YR: CPT | Mod: CPTII,S$GLB,, | Performed by: FAMILY MEDICINE

## 2020-02-21 PROCEDURE — 99499 RISK ADDL DX/OHS AUDIT: ICD-10-PCS | Mod: S$GLB,,, | Performed by: FAMILY MEDICINE

## 2020-02-21 PROCEDURE — 3074F PR MOST RECENT SYSTOLIC BLOOD PRESSURE < 130 MM HG: ICD-10-PCS | Mod: CPTII,S$GLB,, | Performed by: FAMILY MEDICINE

## 2020-02-21 PROCEDURE — 3078F DIAST BP <80 MM HG: CPT | Mod: CPTII,S$GLB,, | Performed by: FAMILY MEDICINE

## 2020-02-21 PROCEDURE — 99214 OFFICE O/P EST MOD 30 MIN: CPT | Mod: S$GLB,,, | Performed by: FAMILY MEDICINE

## 2020-02-21 PROCEDURE — 1126F PR PAIN SEVERITY QUANTIFIED, NO PAIN PRESENT: ICD-10-PCS | Mod: S$GLB,,, | Performed by: FAMILY MEDICINE

## 2020-02-21 PROCEDURE — 99499 UNLISTED E&M SERVICE: CPT | Mod: S$GLB,,, | Performed by: FAMILY MEDICINE

## 2020-02-21 PROCEDURE — 1159F PR MEDICATION LIST DOCUMENTED IN MEDICAL RECORD: ICD-10-PCS | Mod: S$GLB,,, | Performed by: FAMILY MEDICINE

## 2020-02-21 PROCEDURE — 99214 PR OFFICE/OUTPT VISIT, EST, LEVL IV, 30-39 MIN: ICD-10-PCS | Mod: S$GLB,,, | Performed by: FAMILY MEDICINE

## 2020-02-21 PROCEDURE — 99999 PR PBB SHADOW E&M-EST. PATIENT-LVL III: CPT | Mod: PBBFAC,,, | Performed by: FAMILY MEDICINE

## 2020-02-21 PROCEDURE — 99999 PR PBB SHADOW E&M-EST. PATIENT-LVL III: ICD-10-PCS | Mod: PBBFAC,,, | Performed by: FAMILY MEDICINE

## 2020-02-21 PROCEDURE — 1126F AMNT PAIN NOTED NONE PRSNT: CPT | Mod: S$GLB,,, | Performed by: FAMILY MEDICINE

## 2020-02-21 PROCEDURE — 3078F PR MOST RECENT DIASTOLIC BLOOD PRESSURE < 80 MM HG: ICD-10-PCS | Mod: CPTII,S$GLB,, | Performed by: FAMILY MEDICINE

## 2020-02-21 RX ORDER — LISINOPRIL 5 MG/1
5 TABLET ORAL DAILY
Qty: 90 TABLET | Refills: 3 | Status: SHIPPED | OUTPATIENT
Start: 2020-02-21 | End: 2020-07-14 | Stop reason: ALTCHOICE

## 2020-02-21 RX ORDER — TAMSULOSIN HYDROCHLORIDE 0.4 MG/1
CAPSULE ORAL
Qty: 45 CAPSULE | Refills: 3 | Status: SHIPPED | OUTPATIENT
Start: 2020-02-21 | End: 2021-02-19

## 2020-02-21 NOTE — PROGRESS NOTES
Subjective:      Patient ID: Alexander Patel is a 75 y.o. male.    Chief Complaint: Annual Exam    Problem List Items Addressed This Visit     BPH (benign prostatic hyperplasia)    Overview     He has BPH and this has been stable.  He has to get up to the restroom once a night on average. The tamsulosin has helped.           Relevant Medications    tamsulosin (FLOMAX) 0.4 mg Cap    Bradycardia    Overview     He has bradycardia and he had to get a pacemaker. This is followed by cardiology now.         CKD (chronic kidney disease) stage 3, GFR 30-59 ml/min    Overview     Alexander Patel has an Estimated Glumerular Filtration Rate (EGFR) between 30 and 59 consistent with the definition of chronic kidney disease stage 3.    eGFR if non    Date Value Ref Range Status   04/23/2018 53.8 (A) >60 mL/min/1.73 m^2 Final     Comment:     Calculation used to obtain the estimated glomerular filtration  rate (eGFR) is the CKD-EPI equation.            Lab Results   Component Value Date    CREATININE 1.5 (H) 07/26/2019    BUN 19 07/26/2019     07/26/2019    K 4.3 07/26/2019     07/26/2019    CO2 25 07/26/2019       The patient's chronic kidney disease stage 3 was monitored, evaluated, addressed and/or treated.             Relevant Orders    Comprehensive metabolic panel    Essential hypertension    Overview     The patient presents with essential hypertension.  The patient is tolerating the medication well and is in excellent compliance.  The patient is experiencing no side effects.  Counseling was offered regarding low salt diets.  The patient has a reduced salt intake.  The patient denies chest pain, palpitations, shortness of breath, dyspnea on exertion, left or murmur neck pain, nausea, vomiting, diaphoresis, paroxysmal nocturnal dyspnea, and orthopnea.   Hypertension Medications             lisinopril (PRINIVIL,ZESTRIL) 5 MG tablet Take 5 mg by mouth once daily. Pt takes every other day                    Relevant Medications    lisinopril (PRINIVIL,ZESTRIL) 5 MG tablet    Other Relevant Orders    Comprehensive metabolic panel    Hyperlipidemia with target LDL less than 130    Overview     He has hyperlipidemia but has not required medicine for this.  I will check this in Oct.  Lab Results   Component Value Date    CHOL 190 02/06/2017    CHOL 192 08/24/2015    CHOL 182 05/23/2014     Lab Results   Component Value Date    HDL 51 02/06/2017    HDL 42 08/24/2015    HDL 49 05/23/2014     Lab Results   Component Value Date    LDLCALC 124.0 02/06/2017    LDLCALC 114.2 08/24/2015    LDLCALC 114.8 05/23/2014     Lab Results   Component Value Date    TRIG 75 02/06/2017    TRIG 179 (H) 08/24/2015    TRIG 91 05/23/2014     Lab Results   Component Value Date    CHOLHDL 26.8 02/06/2017    CHOLHDL 21.9 08/24/2015    CHOLHDL 26.9 05/23/2014              Relevant Orders    Lipid panel      Other Visit Diagnoses     Encounter for prostate cancer screening    -  Primary    Relevant Orders    PSA, Screening    Sick sinus syndrome              Past Medical History:  Past Medical History:   Diagnosis Date    BPH (benign prostatic hyperplasia)     Essential hypertension 8/28/2015    Gout 2012    Hyperlipidemia LDL goal < 130      Past Surgical History:   Procedure Laterality Date    A-V CARDIAC PACEMAKER INSERTION N/A 8/23/2019    Procedure: INSERTION, CARDIAC PACEMAKER, DUAL CHAMBER;  Surgeon: Manas Meade MD;  Location: [facility] CATH;  Service: Cardiology;  Laterality: N/A;    COLONOSCOPY N/A 10/8/2015    Procedure: COLONOSCOPY;  Surgeon: Avinash Lamb MD;  Location: [facility] ENDO;  Service: Endoscopy;  Laterality: N/A;    FRACTURE SURGERY      hand     Review of patient's allergies indicates:   Allergen Reactions    No known drug allergies      Current Outpatient Medications on File Prior to Visit   Medication Sig Dispense Refill    aspirin 81 MG chewable tablet Take 81 mg by mouth once daily. Every day       "GLUCOSAMINE HCL/CHONDR LAM A NA (OSTEO BI-FLEX ORAL) Take 1 tablet by mouth once daily.       multivit-min/FA/lycopen/lutein (CENTRUM SILVER MEN ORAL) Take 1 tablet by mouth once daily.      [DISCONTINUED] lisinopril (PRINIVIL,ZESTRIL) 5 MG tablet Take 5 mg by mouth once daily. Pt takes every other day      [DISCONTINUED] tamsulosin (FLOMAX) 0.4 mg Cap TAKE 1 CAPSULE EVERY OTHER DAY. 45 capsule 3     No current facility-administered medications on file prior to visit.      Social History     Socioeconomic History    Marital status:      Spouse name: Modesta    Number of children: 2    Years of education: Not on file    Highest education level: Not on file   Occupational History    Occupation: retired   Social Needs    Financial resource strain: Not on file    Food insecurity:     Worry: Not on file     Inability: Not on file    Transportation needs:     Medical: Not on file     Non-medical: Not on file   Tobacco Use    Smoking status: Never Smoker    Smokeless tobacco: Former User     Types: Chew   Substance and Sexual Activity    Alcohol use: Yes     Comment: occasional    Drug use: No    Sexual activity: Yes   Lifestyle    Physical activity:     Days per week: Not on file     Minutes per session: Not on file    Stress: Not on file   Relationships    Social connections:     Talks on phone: Not on file     Gets together: Not on file     Attends Jewish service: Not on file     Active member of club or organization: Not on file     Attends meetings of clubs or organizations: Not on file     Relationship status: Not on file   Other Topics Concern    Not on file   Social History Narrative    Not on file     Family History   Problem Relation Age of Onset    Heart disease Mother     Alcohol abuse Father     Emphysema Father        Review of Systems    Objective:     /75   Pulse 60   Temp 98 °F (36.7 °C) (Oral)   Ht 5' 4" (1.626 m)   Wt 76.2 kg (168 lb)   BMI 28.84 kg/m² "     Physical Exam  Assessment:     1. Encounter for prostate cancer screening    2. Sick sinus syndrome    3. Benign prostatic hyperplasia, unspecified whether lower urinary tract symptoms present    4. CKD (chronic kidney disease) stage 3, GFR 30-59 ml/min    5. Essential hypertension    6. Hyperlipidemia with target LDL less than 130    7. Bradycardia        Plan:     Problem List Items Addressed This Visit     BPH (benign prostatic hyperplasia)    Relevant Medications    tamsulosin (FLOMAX) 0.4 mg Cap    Bradycardia    CKD (chronic kidney disease) stage 3, GFR 30-59 ml/min    Relevant Orders    Comprehensive metabolic panel    Essential hypertension    Relevant Medications    lisinopril (PRINIVIL,ZESTRIL) 5 MG tablet    Other Relevant Orders    Comprehensive metabolic panel    Hyperlipidemia with target LDL less than 130    Relevant Orders    Lipid panel      Other Visit Diagnoses     Encounter for prostate cancer screening    -  Primary    Relevant Orders    PSA, Screening    Sick sinus syndrome            No follow-ups on file.      I have changed Alexander Patel's lisinopril. I am also having him maintain his aspirin, GLUCOSAMINE HCL/CHONDR LAM A NA (OSTEO BI-FLEX ORAL), multivit-min/FA/lycopen/lutein (CENTRUM SILVER MEN ORAL), and tamsulosin.    Alexander was seen today for annual exam.    Diagnoses and all orders for this visit:    Encounter for prostate cancer screening  -     PSA, Screening; Future    Sick sinus syndrome    Benign prostatic hyperplasia, unspecified whether lower urinary tract symptoms present  -     tamsulosin (FLOMAX) 0.4 mg Cap; TAKE 1 CAPSULE EVERY OTHER DAY.    CKD (chronic kidney disease) stage 3, GFR 30-59 ml/min  -     Comprehensive metabolic panel; Future    Essential hypertension  -     lisinopril (PRINIVIL,ZESTRIL) 5 MG tablet; Take 1 tablet (5 mg total) by mouth once daily. Pt takes every other day  -     Comprehensive metabolic panel; Future    Hyperlipidemia with target LDL less  than 130  -     Lipid panel; Future    Bradycardia      Medications Ordered This Encounter   Medications    lisinopril (PRINIVIL,ZESTRIL) 5 MG tablet     Sig: Take 1 tablet (5 mg total) by mouth once daily. Pt takes every other day     Dispense:  90 tablet     Refill:  3    tamsulosin (FLOMAX) 0.4 mg Cap     Sig: TAKE 1 CAPSULE EVERY OTHER DAY.     Dispense:  45 capsule     Refill:  3     The patient was instructed to stop the following meds:  Medications Discontinued During This Encounter   Medication Reason    lisinopril (PRINIVIL,ZESTRIL) 5 MG tablet Reorder    tamsulosin (FLOMAX) 0.4 mg Cap Reorder     Orders Placed This Encounter   Procedures    Comprehensive metabolic panel     Standing Status:   Future     Standing Expiration Date:   2/20/2021    Lipid panel     Standing Status:   Future     Standing Expiration Date:   2/20/2021    PSA, Screening     Standing Status:   Future     Standing Expiration Date:   2/21/2021

## 2020-02-24 DIAGNOSIS — I10 ESSENTIAL HYPERTENSION: ICD-10-CM

## 2020-02-24 RX ORDER — LISINOPRIL 5 MG/1
5 TABLET ORAL DAILY
Qty: 90 TABLET | Refills: 3 | OUTPATIENT
Start: 2020-02-24

## 2020-03-04 ENCOUNTER — TELEPHONE (OUTPATIENT)
Dept: FAMILY MEDICINE | Facility: CLINIC | Age: 75
End: 2020-03-04

## 2020-03-04 NOTE — TELEPHONE ENCOUNTER
----- Message from Ludmila Theodore sent at 3/4/2020  9:30 AM CST -----  [facility]   Would like to consult with nurse regarding getting clarification on prescription. Please give a call back at [phone] ref #[id number].            Thanks,  Ludmila GIBSON

## 2020-03-04 NOTE — TELEPHONE ENCOUNTER
Which set of directions do you want to use:    Sig - Route: Take 1 tablet (5 mg total) by mouth once daily.    Or     Take one tablet every other day.     Please Advise.     ----    Call [phone]    Ref #: [id number]

## 2020-03-05 ENCOUNTER — LAB VISIT (OUTPATIENT)
Dept: LAB | Facility: HOSPITAL | Age: 75
End: 2020-03-05
Attending: FAMILY MEDICINE
Payer: MEDICARE

## 2020-03-05 DIAGNOSIS — I10 ESSENTIAL HYPERTENSION: ICD-10-CM

## 2020-03-05 DIAGNOSIS — E78.5 HYPERLIPIDEMIA WITH TARGET LDL LESS THAN 130: ICD-10-CM

## 2020-03-05 DIAGNOSIS — Z12.5 ENCOUNTER FOR PROSTATE CANCER SCREENING: ICD-10-CM

## 2020-03-05 DIAGNOSIS — N18.30 CKD (CHRONIC KIDNEY DISEASE) STAGE 3, GFR 30-59 ML/MIN: ICD-10-CM

## 2020-03-05 LAB
ALBUMIN SERPL BCP-MCNC: 4.1 G/DL (ref 3.5–5.2)
ALP SERPL-CCNC: 69 U/L (ref 55–135)
ALT SERPL W/O P-5'-P-CCNC: 19 U/L (ref 10–44)
ANION GAP SERPL CALC-SCNC: 6 MMOL/L (ref 8–16)
AST SERPL-CCNC: 23 U/L (ref 10–40)
BILIRUB SERPL-MCNC: 0.5 MG/DL (ref 0.1–1)
BUN SERPL-MCNC: 29 MG/DL (ref 8–23)
CALCIUM SERPL-MCNC: 9.3 MG/DL (ref 8.7–10.5)
CHLORIDE SERPL-SCNC: 107 MMOL/L (ref 95–110)
CHOLEST SERPL-MCNC: 218 MG/DL (ref 120–199)
CHOLEST/HDLC SERPL: 4.8 {RATIO} (ref 2–5)
CO2 SERPL-SCNC: 27 MMOL/L (ref 23–29)
CREAT SERPL-MCNC: 1.8 MG/DL (ref 0.5–1.4)
EST. GFR  (AFRICAN AMERICAN): 41.3 ML/MIN/1.73 M^2
EST. GFR  (NON AFRICAN AMERICAN): 35.8 ML/MIN/1.73 M^2
GLUCOSE SERPL-MCNC: 105 MG/DL (ref 70–110)
HDLC SERPL-MCNC: 45 MG/DL (ref 40–75)
HDLC SERPL: 20.6 % (ref 20–50)
LDLC SERPL CALC-MCNC: 136.4 MG/DL (ref 63–159)
NONHDLC SERPL-MCNC: 173 MG/DL
POTASSIUM SERPL-SCNC: 4.5 MMOL/L (ref 3.5–5.1)
PROT SERPL-MCNC: 7.2 G/DL (ref 6–8.4)
SODIUM SERPL-SCNC: 140 MMOL/L (ref 136–145)
TRIGL SERPL-MCNC: 183 MG/DL (ref 30–150)

## 2020-03-05 PROCEDURE — 36415 COLL VENOUS BLD VENIPUNCTURE: CPT | Mod: PO

## 2020-03-05 PROCEDURE — 80061 LIPID PANEL: CPT

## 2020-03-05 PROCEDURE — 84153 ASSAY OF PSA TOTAL: CPT

## 2020-03-05 PROCEDURE — 80053 COMPREHEN METABOLIC PANEL: CPT

## 2020-03-06 LAB — COMPLEXED PSA SERPL-MCNC: 0.71 NG/ML (ref 0–4)

## 2020-03-09 NOTE — PROGRESS NOTES
The PSA is currently normal.  Please recheck this in 1 year.  The electrolytes are all currently stable compared to the past.  The kidney function is still low but it is consistent with previous numbers.  Repeat the CMP in 6 months.  The cholesterol panel is stable at this time and I will not make any medication adjustments.  Repeat this in 1 year.

## 2020-03-10 ENCOUNTER — TELEPHONE (OUTPATIENT)
Dept: FAMILY MEDICINE | Facility: CLINIC | Age: 75
End: 2020-03-10

## 2020-03-10 DIAGNOSIS — N40.0 BENIGN PROSTATIC HYPERPLASIA, UNSPECIFIED WHETHER LOWER URINARY TRACT SYMPTOMS PRESENT: ICD-10-CM

## 2020-03-10 DIAGNOSIS — Z12.5 ENCOUNTER FOR SCREENING FOR MALIGNANT NEOPLASM OF PROSTATE: ICD-10-CM

## 2020-03-10 DIAGNOSIS — N18.30 CKD (CHRONIC KIDNEY DISEASE) STAGE 3, GFR 30-59 ML/MIN: Primary | ICD-10-CM

## 2020-03-10 DIAGNOSIS — E78.5 HYPERLIPIDEMIA WITH TARGET LDL LESS THAN 130: ICD-10-CM

## 2020-03-10 NOTE — TELEPHONE ENCOUNTER
I have signed for the following orders AND/OR meds.  Please call the patient and ask the patient to schedule the testing AND/OR inform about any medications that were sent.      Orders Placed This Encounter   Procedures    Comprehensive metabolic panel     Standing Status:   Standing     Number of Occurrences:   99     Standing Expiration Date:   5/10/2039    Lipid panel     Standing Status:   Standing     Number of Occurrences:   99     Standing Expiration Date:   5/10/2039    PSA, Screening     Standing Status:   Future     Standing Expiration Date:   5/9/2021

## 2020-03-10 NOTE — TELEPHONE ENCOUNTER
----- Message from Avinash Lamb MD sent at 3/9/2020  7:39 AM CDT -----  The PSA is currently normal.  Please recheck this in 1 year.  The electrolytes are all currently stable compared to the past.  The kidney function is still low but it is consistent with previous numbers.  Repeat the CMP in 6 months.  The cholesterol panel is stable at this time and I will not make any medication adjustments.  Repeat this in 1 year.

## 2020-06-05 ENCOUNTER — CLINICAL SUPPORT (OUTPATIENT)
Dept: CARDIOLOGY | Facility: CLINIC | Age: 75
End: 2020-06-05
Payer: MEDICARE

## 2020-06-05 PROCEDURE — 93294 CARDIAC DEVICE CHECK - REMOTE: ICD-10-PCS | Mod: ,,, | Performed by: INTERNAL MEDICINE

## 2020-06-05 PROCEDURE — 93296 REM INTERROG EVL PM/IDS: CPT | Mod: PBBFAC,PO | Performed by: INTERNAL MEDICINE

## 2020-06-05 PROCEDURE — 93294 REM INTERROG EVL PM/LDLS PM: CPT | Mod: ,,, | Performed by: INTERNAL MEDICINE

## 2020-06-16 ENCOUNTER — TELEPHONE (OUTPATIENT)
Dept: CARDIOLOGY | Facility: CLINIC | Age: 75
End: 2020-06-16

## 2020-06-16 NOTE — TELEPHONE ENCOUNTER
----- Message from Terri Ferrell sent at 6/16/2020  8:16 AM CDT -----  Pt wife called stating that pt needs to be seen sooner. Pt wife is asking for a call from the office after 1pm today at [phone]

## 2020-07-02 ENCOUNTER — NURSE TRIAGE (OUTPATIENT)
Dept: ADMINISTRATIVE | Facility: CLINIC | Age: 75
End: 2020-07-02

## 2020-07-02 NOTE — TELEPHONE ENCOUNTER
Reason for Disposition   Systolic BP < 90 and feeling dizzy, lightheaded, or weak    Protocols used: LOW BLOOD PRESSURE-A-OH

## 2020-07-02 NOTE — TELEPHONE ENCOUNTER
Speaking to Modesta (wife) on behalf of pt    Caller states pacemaker placed august due to bradycardia, no issues since but lately pt has been c/o of dizziness/lightheadedness. Wife reports BP currently 84/46. While on phone with wife, pt had fall. Denies LOC or head trauma. Pt heard doorbell ring and so pt got up to answer door, reports feeling lightheaded prior to falling. Pt was speaking to wife immediately after fall. Advised to go to ED, closest ED is [place] but wife states she would rather go to [facility]. Wife will call daughter to assist pt into car safely.

## 2020-07-09 ENCOUNTER — HOSPITAL ENCOUNTER (OUTPATIENT)
Dept: RADIOLOGY | Facility: HOSPITAL | Age: 75
Discharge: HOME | End: 2020-07-09
Attending: INTERNAL MEDICINE
Payer: MEDICARE

## 2020-07-09 ENCOUNTER — OFFICE VISIT (OUTPATIENT)
Dept: NEPHROLOGY | Facility: CLINIC | Age: 75
End: 2020-07-09
Payer: MEDICARE

## 2020-07-09 VITALS
WEIGHT: 167.56 LBS | BODY MASS INDEX: 28.6 KG/M2 | HEIGHT: 64 IN | SYSTOLIC BLOOD PRESSURE: 110 MMHG | DIASTOLIC BLOOD PRESSURE: 68 MMHG | HEART RATE: 60 BPM | TEMPERATURE: 99 F

## 2020-07-09 DIAGNOSIS — N40.0 BENIGN PROSTATIC HYPERPLASIA, UNSPECIFIED WHETHER LOWER URINARY TRACT SYMPTOMS PRESENT: ICD-10-CM

## 2020-07-09 DIAGNOSIS — N13.9 OBSTRUCTIVE UROPATHY: ICD-10-CM

## 2020-07-09 DIAGNOSIS — N17.9 AKI (ACUTE KIDNEY INJURY): Primary | ICD-10-CM

## 2020-07-09 DIAGNOSIS — I95.9 HYPOTENSION, UNSPECIFIED HYPOTENSION TYPE: ICD-10-CM

## 2020-07-09 DIAGNOSIS — N18.30 CKD (CHRONIC KIDNEY DISEASE) STAGE 3, GFR 30-59 ML/MIN: ICD-10-CM

## 2020-07-09 DIAGNOSIS — N17.9 AKI (ACUTE KIDNEY INJURY): ICD-10-CM

## 2020-07-09 PROCEDURE — 99999 PR PBB SHADOW E&M-EST. PATIENT-LVL III: ICD-10-PCS | Mod: PBBFAC,,, | Performed by: INTERNAL MEDICINE

## 2020-07-09 PROCEDURE — 99214 PR OFFICE/OUTPT VISIT, EST, LEVL IV, 30-39 MIN: ICD-10-PCS | Mod: S$GLB,,, | Performed by: INTERNAL MEDICINE

## 2020-07-09 PROCEDURE — 3074F SYST BP LT 130 MM HG: CPT | Mod: CPTII,S$GLB,, | Performed by: INTERNAL MEDICINE

## 2020-07-09 PROCEDURE — 76770 US RETROPERITONEAL COMPLETE: ICD-10-PCS | Mod: 26,,, | Performed by: RADIOLOGY

## 2020-07-09 PROCEDURE — 3074F PR MOST RECENT SYSTOLIC BLOOD PRESSURE < 130 MM HG: ICD-10-PCS | Mod: CPTII,S$GLB,, | Performed by: INTERNAL MEDICINE

## 2020-07-09 PROCEDURE — 1159F MED LIST DOCD IN RCRD: CPT | Mod: S$GLB,,, | Performed by: INTERNAL MEDICINE

## 2020-07-09 PROCEDURE — 99999 PR PBB SHADOW E&M-EST. PATIENT-LVL III: CPT | Mod: PBBFAC,,, | Performed by: INTERNAL MEDICINE

## 2020-07-09 PROCEDURE — 1101F PR PT FALLS ASSESS DOC 0-1 FALLS W/OUT INJ PAST YR: ICD-10-PCS | Mod: CPTII,S$GLB,, | Performed by: INTERNAL MEDICINE

## 2020-07-09 PROCEDURE — 99499 UNLISTED E&M SERVICE: CPT | Mod: S$GLB,,, | Performed by: INTERNAL MEDICINE

## 2020-07-09 PROCEDURE — 1159F PR MEDICATION LIST DOCUMENTED IN MEDICAL RECORD: ICD-10-PCS | Mod: S$GLB,,, | Performed by: INTERNAL MEDICINE

## 2020-07-09 PROCEDURE — 3078F DIAST BP <80 MM HG: CPT | Mod: CPTII,S$GLB,, | Performed by: INTERNAL MEDICINE

## 2020-07-09 PROCEDURE — 1101F PT FALLS ASSESS-DOCD LE1/YR: CPT | Mod: CPTII,S$GLB,, | Performed by: INTERNAL MEDICINE

## 2020-07-09 PROCEDURE — 99214 OFFICE O/P EST MOD 30 MIN: CPT | Mod: S$GLB,,, | Performed by: INTERNAL MEDICINE

## 2020-07-09 PROCEDURE — 99499 RISK ADDL DX/OHS AUDIT: ICD-10-PCS | Mod: S$GLB,,, | Performed by: INTERNAL MEDICINE

## 2020-07-09 PROCEDURE — 76770 US EXAM ABDO BACK WALL COMP: CPT | Mod: 26,,, | Performed by: RADIOLOGY

## 2020-07-09 PROCEDURE — 3078F PR MOST RECENT DIASTOLIC BLOOD PRESSURE < 80 MM HG: ICD-10-PCS | Mod: CPTII,S$GLB,, | Performed by: INTERNAL MEDICINE

## 2020-07-09 PROCEDURE — 76770 US EXAM ABDO BACK WALL COMP: CPT | Mod: TC,PO

## 2020-07-09 RX ORDER — LISINOPRIL 5 MG/1
TABLET ORAL
COMMUNITY
Start: 2020-04-29 | End: 2020-07-09

## 2020-07-09 RX ORDER — TERBINAFINE HYDROCHLORIDE 250 MG/1
TABLET ORAL
COMMUNITY
Start: 2020-05-25 | End: 2020-08-10 | Stop reason: ALTCHOICE

## 2020-07-09 RX ORDER — TRIAMCINOLONE ACETONIDE 1 MG/G
CREAM TOPICAL
COMMUNITY
Start: 2020-05-18 | End: 2020-08-10 | Stop reason: ALTCHOICE

## 2020-07-09 RX ORDER — KETOCONAZOLE 20 MG/G
CREAM TOPICAL
COMMUNITY
Start: 2020-05-18 | End: 2021-10-28

## 2020-07-09 NOTE — PROGRESS NOTES
"Subjective:       Patient ID: Alexander Patel is a 75 y.o. White male who presents for return patient evaluation for chronic renal failure.    This patient presents today after a recent ER visit.  He saw Dr. Dai a couple of years ago but has not seen her since.  He had dizziness and near-syncope recently.  His blood pressure has been low at home.  He also freely states that his urine stream is poor and he has frequency and incomplete voiding.  He takes flomax but it is not helping.  He used to see Dr. Hudson in the distant past.  He also has B flank pain and fullness at times.  His blood pressure at home has been /43-60.      Review of Systems   Constitutional: Negative for fever.   HENT: Positive for hearing loss. Negative for congestion.    Respiratory: Negative for cough and shortness of breath.    Cardiovascular: Negative for leg swelling.   Gastrointestinal: Negative for nausea.   Genitourinary: Positive for decreased urine volume, difficulty urinating, flank pain and frequency.   Musculoskeletal: Positive for arthralgias.   Neurological: Positive for syncope and light-headedness.   Hematological: Bruises/bleeds easily.   Psychiatric/Behavioral: Negative for confusion.       The past medical, family and social histories were reviewed for this encounter.     /68   Pulse 60   Temp 99.3 °F (37.4 °C)   Ht 5' 4" (1.626 m)   Wt 76 kg (167 lb 8.8 oz)   BMI 28.76 kg/m²     Objective:      Physical Exam  Vitals signs reviewed.   Constitutional:       General: He is not in acute distress.     Appearance: He is well-developed.   HENT:      Head: Normocephalic and atraumatic.   Eyes:      General: No scleral icterus.     Conjunctiva/sclera: Conjunctivae normal.   Neck:      Musculoskeletal: Normal range of motion.      Vascular: No JVD.   Cardiovascular:      Rate and Rhythm: Normal rate and regular rhythm.      Heart sounds: Normal heart sounds. No murmur. No friction rub. No gallop.       Comments: " diminished  Pulmonary:      Effort: Pulmonary effort is normal. No respiratory distress.      Breath sounds: Normal breath sounds. No wheezing or rales.   Abdominal:      General: Bowel sounds are normal. There is no distension.      Palpations: Abdomen is soft.      Tenderness: There is no abdominal tenderness.   Musculoskeletal:      Right lower leg: Edema present.      Left lower leg: No edema.   Skin:     General: Skin is warm and dry.      Findings: No rash.   Neurological:      Mental Status: He is alert and oriented to person, place, and time.   Psychiatric:         Mood and Affect: Mood normal.         Behavior: Behavior normal.         Assessment:       1. ZEINA (acute kidney injury)    2. CKD (chronic kidney disease) stage 3, GFR 30-59 ml/min    3. Obstructive uropathy    4. Benign prostatic hyperplasia, unspecified whether lower urinary tract symptoms present    5. Hypotension, unspecified hypotension type        Plan:   Return to clinic in 1 month with Dr. Dai in [place].  Labs for next visit include rp.  Baseline creatinine is 1.2 since 2009.  Stop lisinopril.  Renal US with PVD.   referral ASAP.  Renal US in 2017 showed a large prostate and R 9.6 cm L 10.0 cm.

## 2020-07-12 ENCOUNTER — PATIENT MESSAGE (OUTPATIENT)
Dept: NEPHROLOGY | Facility: CLINIC | Age: 75
End: 2020-07-12

## 2020-07-13 ENCOUNTER — TELEPHONE (OUTPATIENT)
Dept: UROLOGY | Facility: CLINIC | Age: 75
End: 2020-07-13

## 2020-07-13 NOTE — TELEPHONE ENCOUNTER
----- Message from Esme  sent at 7/13/2020 11:42 AM CDT -----  Regarding: returning call  Contact: spouse  Type:  Patient Returning Call    Who Called:  spouse  Who Left Message for Patient:  ELLIE  Does the patient know what this is regarding?:  yes  Best Call Back Number:    Additional Information:  spouse called back

## 2020-07-14 ENCOUNTER — OFFICE VISIT (OUTPATIENT)
Dept: CARDIOLOGY | Facility: CLINIC | Age: 75
End: 2020-07-14
Payer: MEDICARE

## 2020-07-14 VITALS
SYSTOLIC BLOOD PRESSURE: 150 MMHG | DIASTOLIC BLOOD PRESSURE: 80 MMHG | HEART RATE: 60 BPM | WEIGHT: 167.75 LBS | TEMPERATURE: 99 F | HEIGHT: 66 IN | BODY MASS INDEX: 26.96 KG/M2

## 2020-07-14 DIAGNOSIS — N18.30 CKD (CHRONIC KIDNEY DISEASE) STAGE 3, GFR 30-59 ML/MIN: ICD-10-CM

## 2020-07-14 DIAGNOSIS — I44.1 SECOND DEGREE HEART BLOCK: Primary | ICD-10-CM

## 2020-07-14 DIAGNOSIS — I95.1 ORTHOSTATIC HYPOTENSION: ICD-10-CM

## 2020-07-14 DIAGNOSIS — E78.5 HYPERLIPIDEMIA WITH TARGET LDL LESS THAN 130: ICD-10-CM

## 2020-07-14 PROCEDURE — 99999 PR PBB SHADOW E&M-EST. PATIENT-LVL III: ICD-10-PCS | Mod: PBBFAC,,, | Performed by: INTERNAL MEDICINE

## 2020-07-14 PROCEDURE — 1100F PR PT FALLS ASSESS DOC 2+ FALLS/FALL W/INJURY/YR: ICD-10-PCS | Mod: CPTII,S$GLB,, | Performed by: INTERNAL MEDICINE

## 2020-07-14 PROCEDURE — 3077F SYST BP >= 140 MM HG: CPT | Mod: CPTII,S$GLB,, | Performed by: INTERNAL MEDICINE

## 2020-07-14 PROCEDURE — 99214 PR OFFICE/OUTPT VISIT, EST, LEVL IV, 30-39 MIN: ICD-10-PCS | Mod: S$GLB,,, | Performed by: INTERNAL MEDICINE

## 2020-07-14 PROCEDURE — 3288F PR FALLS RISK ASSESSMENT DOCUMENTED: ICD-10-PCS | Mod: CPTII,S$GLB,, | Performed by: INTERNAL MEDICINE

## 2020-07-14 PROCEDURE — 3079F PR MOST RECENT DIASTOLIC BLOOD PRESSURE 80-89 MM HG: ICD-10-PCS | Mod: CPTII,S$GLB,, | Performed by: INTERNAL MEDICINE

## 2020-07-14 PROCEDURE — 1126F PR PAIN SEVERITY QUANTIFIED, NO PAIN PRESENT: ICD-10-PCS | Mod: S$GLB,,, | Performed by: INTERNAL MEDICINE

## 2020-07-14 PROCEDURE — 1100F PTFALLS ASSESS-DOCD GE2>/YR: CPT | Mod: CPTII,S$GLB,, | Performed by: INTERNAL MEDICINE

## 2020-07-14 PROCEDURE — 3079F DIAST BP 80-89 MM HG: CPT | Mod: CPTII,S$GLB,, | Performed by: INTERNAL MEDICINE

## 2020-07-14 PROCEDURE — 1159F MED LIST DOCD IN RCRD: CPT | Mod: S$GLB,,, | Performed by: INTERNAL MEDICINE

## 2020-07-14 PROCEDURE — 99214 OFFICE O/P EST MOD 30 MIN: CPT | Mod: S$GLB,,, | Performed by: INTERNAL MEDICINE

## 2020-07-14 PROCEDURE — 1126F AMNT PAIN NOTED NONE PRSNT: CPT | Mod: S$GLB,,, | Performed by: INTERNAL MEDICINE

## 2020-07-14 PROCEDURE — 3077F PR MOST RECENT SYSTOLIC BLOOD PRESSURE >= 140 MM HG: ICD-10-PCS | Mod: CPTII,S$GLB,, | Performed by: INTERNAL MEDICINE

## 2020-07-14 PROCEDURE — 1159F PR MEDICATION LIST DOCUMENTED IN MEDICAL RECORD: ICD-10-PCS | Mod: S$GLB,,, | Performed by: INTERNAL MEDICINE

## 2020-07-14 PROCEDURE — 3288F FALL RISK ASSESSMENT DOCD: CPT | Mod: CPTII,S$GLB,, | Performed by: INTERNAL MEDICINE

## 2020-07-14 PROCEDURE — 99999 PR PBB SHADOW E&M-EST. PATIENT-LVL III: CPT | Mod: PBBFAC,,, | Performed by: INTERNAL MEDICINE

## 2020-07-14 NOTE — PROGRESS NOTES
Subjective:    Patient ID:  Alexander Patel is a 75 y.o. male who presents for follow-up of ER visit    HPI  Went to the ER 2 weeks ago with episode of low BP. Cardiac w/u (-)  Lisinopril stopped  He comes for follow up with no major problems, no chest pain, no shortness of breath.  Creatinine slowly going up    Review of Systems   Constitution: Negative for decreased appetite, malaise/fatigue, weight gain and weight loss.   Cardiovascular: Negative for chest pain, dyspnea on exertion, leg swelling, palpitations and syncope.   Respiratory: Negative for cough and shortness of breath.    Gastrointestinal: Negative.    Neurological: Negative for weakness.   All other systems reviewed and are negative.       Objective:      Physical Exam   Constitutional: He is oriented to person, place, and time. He appears well-developed and well-nourished.   HENT:   Head: Normocephalic.   Eyes: Pupils are equal, round, and reactive to light.   Neck: Normal range of motion. Neck supple. No JVD present. Carotid bruit is not present. No thyromegaly present.   Cardiovascular: Normal rate, regular rhythm, normal heart sounds, intact distal pulses and normal pulses. PMI is not displaced. Exam reveals no gallop.   No murmur heard.  Pulmonary/Chest: Effort normal and breath sounds normal.   Abdominal: Soft. Normal appearance. He exhibits no mass. There is no hepatosplenomegaly. There is no abdominal tenderness.   Musculoskeletal: Normal range of motion.         General: No edema.   Neurological: He is alert and oriented to person, place, and time. He has normal strength and normal reflexes. No sensory deficit.   Skin: Skin is warm and intact.   Psychiatric: He has a normal mood and affect.   Nursing note and vitals reviewed.        Assessment:       1. Second degree heart block    2. Hyperlipidemia with target LDL less than 130    3. CKD (chronic kidney disease) stage 3, GFR 30-59 ml/min    4. Orthostatic hypotension         Plan:   Ok to  stop lisinopril  Continue all cardiac medications  Regular exercise program  3 m f/u with CARISA sandra

## 2020-07-20 ENCOUNTER — OFFICE VISIT (OUTPATIENT)
Dept: UROLOGY | Facility: CLINIC | Age: 75
End: 2020-07-20
Payer: MEDICARE

## 2020-07-20 VITALS
WEIGHT: 167.75 LBS | SYSTOLIC BLOOD PRESSURE: 143 MMHG | HEIGHT: 66 IN | DIASTOLIC BLOOD PRESSURE: 88 MMHG | BODY MASS INDEX: 26.96 KG/M2 | HEART RATE: 66 BPM

## 2020-07-20 DIAGNOSIS — N40.1 ENLARGED PROSTATE WITH URINARY OBSTRUCTION: Primary | ICD-10-CM

## 2020-07-20 DIAGNOSIS — N20.0 KIDNEY STONE: ICD-10-CM

## 2020-07-20 DIAGNOSIS — R35.0 INCREASED URINARY FREQUENCY: ICD-10-CM

## 2020-07-20 DIAGNOSIS — N13.8 ENLARGED PROSTATE WITH URINARY OBSTRUCTION: Primary | ICD-10-CM

## 2020-07-20 LAB
BILIRUB SERPL-MCNC: ABNORMAL MG/DL
BLOOD URINE, POC: ABNORMAL
CLARITY, POC UA: CLEAR
COLOR, POC UA: YELLOW
GLUCOSE UR QL STRIP: ABNORMAL
KETONES UR QL STRIP: ABNORMAL
LEUKOCYTE ESTERASE URINE, POC: ABNORMAL
NITRITE, POC UA: ABNORMAL
PH, POC UA: 5.5
PROTEIN, POC: ABNORMAL
SPECIFIC GRAVITY, POC UA: 1.02
UROBILINOGEN, POC UA: 0.2

## 2020-07-20 PROCEDURE — 1159F MED LIST DOCD IN RCRD: CPT | Mod: S$GLB,,, | Performed by: UROLOGY

## 2020-07-20 PROCEDURE — 99999 PR PBB SHADOW E&M-EST. PATIENT-LVL III: CPT | Mod: PBBFAC,,, | Performed by: UROLOGY

## 2020-07-20 PROCEDURE — 1126F PR PAIN SEVERITY QUANTIFIED, NO PAIN PRESENT: ICD-10-PCS | Mod: S$GLB,,, | Performed by: UROLOGY

## 2020-07-20 PROCEDURE — 81002 URINALYSIS NONAUTO W/O SCOPE: CPT | Mod: S$GLB,,, | Performed by: UROLOGY

## 2020-07-20 PROCEDURE — 99999 PR PBB SHADOW E&M-EST. PATIENT-LVL III: ICD-10-PCS | Mod: PBBFAC,,, | Performed by: UROLOGY

## 2020-07-20 PROCEDURE — 81002 POCT URINE DIPSTICK WITHOUT MICROSCOPE: ICD-10-PCS | Mod: S$GLB,,, | Performed by: UROLOGY

## 2020-07-20 PROCEDURE — 3077F PR MOST RECENT SYSTOLIC BLOOD PRESSURE >= 140 MM HG: ICD-10-PCS | Mod: CPTII,S$GLB,, | Performed by: UROLOGY

## 2020-07-20 PROCEDURE — 3079F DIAST BP 80-89 MM HG: CPT | Mod: CPTII,S$GLB,, | Performed by: UROLOGY

## 2020-07-20 PROCEDURE — 1101F PR PT FALLS ASSESS DOC 0-1 FALLS W/OUT INJ PAST YR: ICD-10-PCS | Mod: CPTII,S$GLB,, | Performed by: UROLOGY

## 2020-07-20 PROCEDURE — 99204 PR OFFICE/OUTPT VISIT, NEW, LEVL IV, 45-59 MIN: ICD-10-PCS | Mod: 25,S$GLB,, | Performed by: UROLOGY

## 2020-07-20 PROCEDURE — 3079F PR MOST RECENT DIASTOLIC BLOOD PRESSURE 80-89 MM HG: ICD-10-PCS | Mod: CPTII,S$GLB,, | Performed by: UROLOGY

## 2020-07-20 PROCEDURE — 1159F PR MEDICATION LIST DOCUMENTED IN MEDICAL RECORD: ICD-10-PCS | Mod: S$GLB,,, | Performed by: UROLOGY

## 2020-07-20 PROCEDURE — 1101F PT FALLS ASSESS-DOCD LE1/YR: CPT | Mod: CPTII,S$GLB,, | Performed by: UROLOGY

## 2020-07-20 PROCEDURE — 1126F AMNT PAIN NOTED NONE PRSNT: CPT | Mod: S$GLB,,, | Performed by: UROLOGY

## 2020-07-20 PROCEDURE — 99204 OFFICE O/P NEW MOD 45 MIN: CPT | Mod: 25,S$GLB,, | Performed by: UROLOGY

## 2020-07-20 PROCEDURE — 3077F SYST BP >= 140 MM HG: CPT | Mod: CPTII,S$GLB,, | Performed by: UROLOGY

## 2020-07-20 NOTE — LETTER
July 20, 2020      Jeremias Rasmussen MD  [address] [facility] [address]  [address]  [facility] [address]           [facility]  [address] [place] [address]  Phone: [phone]  Fax: [phone]          Patient: Alexander Patel   MR Number: [id number]   YOB: 1945   Date of Visit: 7/20/2020       Dear Dr. Jeremias Rasmussen:    Thank you for referring Alexander Patel to me for evaluation. Attached you will find relevant portions of my assessment and plan of care.    If you have questions, please do not hesitate to call me. I look forward to following Alexander Patel along with you.    Sincerely,    ELADIA Martínez MD    Enclosure  CC:  No Recipients    If you would like to receive this communication electronically, please contact [email or web address] or [phone] to request more information on [employer] access.    For providers and/or their staff who would like to refer a patient to [facility], please contact us through our one-stop-shop provider referral line, [facility], at [phone].    If you feel you have received this communication in error or would no longer like to receive these types of communications, please e-mail [email or web address]

## 2020-07-20 NOTE — PROGRESS NOTES
Subjective:       Patient ID: Alexander Patel is a 75 y.o. male.    Chief Complaint: Other (review US)    HPI     75-year-old is here for follow-up regarding the results of a recent kidney ultrasound.  He was noted had elevated creatinine which prompted the imaging.  The ultrasound noted several small hyperechoic foci in the right kidney which may represent small kidney stones.  There is no hydronephrosis of either kidney.  Additionally in the bladder prostate was noted to be moderately enlarged and is postvoid residuals measured at 73 mL.  Does have a history of BPH and has been taking Flomax for years.  He denies hematuria and dysuria.  We discussed alternative treatments for BPH.  I personally reviewed the ultrasound images with the patient.  Urine dipstick shows negative for all components except trace blood.    PSA, SCREEN (ng/mL)   Date Value   03/05/2020 0.71     Past Medical History:   Diagnosis Date    BPH (benign prostatic hyperplasia)     Essential hypertension 8/28/2015    Gout 2012    Hyperlipidemia LDL goal < 130      Past Surgical History:   Procedure Laterality Date    A-V CARDIAC PACEMAKER INSERTION N/A 8/23/2019    Procedure: INSERTION, CARDIAC PACEMAKER, DUAL CHAMBER;  Surgeon: Manas Meade MD;  Location[facility];  Service: Cardiology;  Laterality: N/A;    COLONOSCOPY N/A 10/8/2015    Procedure: COLONOSCOPY;  Surgeon: Avinash Lamb MD;  Location[facility] ENDO;  Service: Endoscopy;  Laterality: N/A;    FRACTURE SURGERY      hand       Current Outpatient Medications:     aspirin 81 MG chewable tablet, Take 81 mg by mouth once daily. Every day, Disp: , Rfl:     GLUCOSAMINE HCL/CHONDR LAM A NA (OSTEO BI-FLEX ORAL), Take 1 tablet by mouth once daily. , Disp: , Rfl:     ketoconazole (NIZORAL) 2 % cream, , Disp: , Rfl:     multivit-min/FA/lycopen/lutein (CENTRUM SILVER MEN ORAL), Take 1 tablet by mouth once daily., Disp: , Rfl:     tamsulosin (FLOMAX) 0.4 mg Cap, TAKE 1 CAPSULE  EVERY OTHER DAY., Disp: 45 capsule, Rfl: 3    terbinafine HCL (LAMISIL) 250 mg tablet, , Disp: , Rfl:     triamcinolone acetonide 0.1% (KENALOG) 0.1 % cream, , Disp: , Rfl:       Review of Systems   Constitutional: Negative for fever.   Eyes: Negative for visual disturbance.   Respiratory: Negative for shortness of breath.    Cardiovascular: Negative for chest pain.   Gastrointestinal: Negative for nausea.   Genitourinary: Negative for dysuria and hematuria.   Musculoskeletal: Negative for gait problem.   Skin: Negative for rash.   Neurological: Negative for seizures.   Psychiatric/Behavioral: Negative for confusion.       Objective:      Physical Exam  Vitals signs reviewed.   Constitutional:       Appearance: He is well-developed.   HENT:      Head: Normocephalic and atraumatic.   Eyes:      Conjunctiva/sclera: Conjunctivae normal.   Cardiovascular:      Rate and Rhythm: Normal rate.   Pulmonary:      Effort: Pulmonary effort is normal.   Genitourinary:     Penis: Normal.       Scrotum/Testes: Normal.      Prostate: Enlarged (50g, s/s/a). Not tender.      Rectum: No mass. Normal anal tone.   Musculoskeletal: Normal range of motion.   Skin:     General: Skin is warm and dry.      Findings: No rash.   Neurological:      Mental Status: He is alert and oriented to person, place, and time.         Assessment:       1. Enlarged prostate with urinary obstruction    2. Increased urinary frequency    3. Kidney stone        Plan:       Enlarged prostate with urinary obstruction  -     POCT URINE DIPSTICK WITHOUT MICROSCOPE    Increased urinary frequency    Kidney stone      tiny nonobstructing stones.  No treatment recommended.  He has BPH and incomplete bladder emptying.  We discussed treatment options but he wants to continue Flomax for now.  He will try decreasing his caffeine intake as well

## 2020-08-06 ENCOUNTER — LAB VISIT (OUTPATIENT)
Dept: LAB | Facility: HOSPITAL | Age: 75
End: 2020-08-06
Attending: INTERNAL MEDICINE
Payer: MEDICARE

## 2020-08-06 DIAGNOSIS — N17.9 AKI (ACUTE KIDNEY INJURY): ICD-10-CM

## 2020-08-06 DIAGNOSIS — I95.9 HYPOTENSION, UNSPECIFIED HYPOTENSION TYPE: ICD-10-CM

## 2020-08-06 DIAGNOSIS — N13.9 OBSTRUCTIVE UROPATHY: ICD-10-CM

## 2020-08-06 DIAGNOSIS — N40.0 BENIGN PROSTATIC HYPERPLASIA, UNSPECIFIED WHETHER LOWER URINARY TRACT SYMPTOMS PRESENT: ICD-10-CM

## 2020-08-06 DIAGNOSIS — N18.30 CKD (CHRONIC KIDNEY DISEASE) STAGE 3, GFR 30-59 ML/MIN: ICD-10-CM

## 2020-08-06 LAB
ALBUMIN SERPL BCP-MCNC: 4.3 G/DL (ref 3.5–5.2)
ANION GAP SERPL CALC-SCNC: 6 MMOL/L (ref 8–16)
BUN SERPL-MCNC: 30 MG/DL (ref 8–23)
CALCIUM SERPL-MCNC: 9.6 MG/DL (ref 8.7–10.5)
CHLORIDE SERPL-SCNC: 106 MMOL/L (ref 95–110)
CO2 SERPL-SCNC: 27 MMOL/L (ref 23–29)
CREAT SERPL-MCNC: 1.8 MG/DL (ref 0.5–1.4)
EST. GFR  (AFRICAN AMERICAN): 41.3 ML/MIN/1.73 M^2
EST. GFR  (NON AFRICAN AMERICAN): 35.8 ML/MIN/1.73 M^2
GLUCOSE SERPL-MCNC: 99 MG/DL (ref 70–110)
PHOSPHATE SERPL-MCNC: 3.2 MG/DL (ref 2.7–4.5)
POTASSIUM SERPL-SCNC: 5.2 MMOL/L (ref 3.5–5.1)
SODIUM SERPL-SCNC: 139 MMOL/L (ref 136–145)

## 2020-08-06 PROCEDURE — 36415 COLL VENOUS BLD VENIPUNCTURE: CPT | Mod: PO

## 2020-08-06 PROCEDURE — 80069 RENAL FUNCTION PANEL: CPT

## 2020-08-10 ENCOUNTER — TELEPHONE (OUTPATIENT)
Dept: NEPHROLOGY | Facility: CLINIC | Age: 75
End: 2020-08-10

## 2020-08-10 ENCOUNTER — OFFICE VISIT (OUTPATIENT)
Dept: NEPHROLOGY | Facility: CLINIC | Age: 75
End: 2020-08-10
Payer: MEDICARE

## 2020-08-10 VITALS
BODY MASS INDEX: 27.06 KG/M2 | HEIGHT: 66 IN | HEART RATE: 60 BPM | WEIGHT: 168.38 LBS | DIASTOLIC BLOOD PRESSURE: 78 MMHG | OXYGEN SATURATION: 98 % | SYSTOLIC BLOOD PRESSURE: 122 MMHG

## 2020-08-10 DIAGNOSIS — N17.9 AKI (ACUTE KIDNEY INJURY): ICD-10-CM

## 2020-08-10 DIAGNOSIS — I95.9 HYPOTENSION, UNSPECIFIED HYPOTENSION TYPE: ICD-10-CM

## 2020-08-10 DIAGNOSIS — N17.9 AKI (ACUTE KIDNEY INJURY): Primary | ICD-10-CM

## 2020-08-10 DIAGNOSIS — N40.0 BENIGN PROSTATIC HYPERPLASIA, UNSPECIFIED WHETHER LOWER URINARY TRACT SYMPTOMS PRESENT: ICD-10-CM

## 2020-08-10 DIAGNOSIS — I44.1 SECOND DEGREE HEART BLOCK: ICD-10-CM

## 2020-08-10 DIAGNOSIS — N18.30 CKD (CHRONIC KIDNEY DISEASE) STAGE 3, GFR 30-59 ML/MIN: Primary | ICD-10-CM

## 2020-08-10 DIAGNOSIS — I95.1 ORTHOSTATIC HYPOTENSION: ICD-10-CM

## 2020-08-10 PROCEDURE — 3074F PR MOST RECENT SYSTOLIC BLOOD PRESSURE < 130 MM HG: ICD-10-PCS | Mod: CPTII,S$GLB,, | Performed by: INTERNAL MEDICINE

## 2020-08-10 PROCEDURE — 1126F AMNT PAIN NOTED NONE PRSNT: CPT | Mod: S$GLB,,, | Performed by: INTERNAL MEDICINE

## 2020-08-10 PROCEDURE — 1159F PR MEDICATION LIST DOCUMENTED IN MEDICAL RECORD: ICD-10-PCS | Mod: S$GLB,,, | Performed by: INTERNAL MEDICINE

## 2020-08-10 PROCEDURE — 99999 PR PBB SHADOW E&M-EST. PATIENT-LVL III: ICD-10-PCS | Mod: PBBFAC,,, | Performed by: INTERNAL MEDICINE

## 2020-08-10 PROCEDURE — 1101F PR PT FALLS ASSESS DOC 0-1 FALLS W/OUT INJ PAST YR: ICD-10-PCS | Mod: CPTII,S$GLB,, | Performed by: INTERNAL MEDICINE

## 2020-08-10 PROCEDURE — 99999 PR PBB SHADOW E&M-EST. PATIENT-LVL III: CPT | Mod: PBBFAC,,, | Performed by: INTERNAL MEDICINE

## 2020-08-10 PROCEDURE — 1159F MED LIST DOCD IN RCRD: CPT | Mod: S$GLB,,, | Performed by: INTERNAL MEDICINE

## 2020-08-10 PROCEDURE — 3074F SYST BP LT 130 MM HG: CPT | Mod: CPTII,S$GLB,, | Performed by: INTERNAL MEDICINE

## 2020-08-10 PROCEDURE — 99214 PR OFFICE/OUTPT VISIT, EST, LEVL IV, 30-39 MIN: ICD-10-PCS | Mod: S$GLB,,, | Performed by: INTERNAL MEDICINE

## 2020-08-10 PROCEDURE — 99499 UNLISTED E&M SERVICE: CPT | Mod: S$GLB,,, | Performed by: INTERNAL MEDICINE

## 2020-08-10 PROCEDURE — 1126F PR PAIN SEVERITY QUANTIFIED, NO PAIN PRESENT: ICD-10-PCS | Mod: S$GLB,,, | Performed by: INTERNAL MEDICINE

## 2020-08-10 PROCEDURE — 1101F PT FALLS ASSESS-DOCD LE1/YR: CPT | Mod: CPTII,S$GLB,, | Performed by: INTERNAL MEDICINE

## 2020-08-10 PROCEDURE — 99499 RISK ADDL DX/OHS AUDIT: ICD-10-PCS | Mod: S$GLB,,, | Performed by: INTERNAL MEDICINE

## 2020-08-10 PROCEDURE — 3078F PR MOST RECENT DIASTOLIC BLOOD PRESSURE < 80 MM HG: ICD-10-PCS | Mod: CPTII,S$GLB,, | Performed by: INTERNAL MEDICINE

## 2020-08-10 PROCEDURE — 99214 OFFICE O/P EST MOD 30 MIN: CPT | Mod: S$GLB,,, | Performed by: INTERNAL MEDICINE

## 2020-08-10 PROCEDURE — 3078F DIAST BP <80 MM HG: CPT | Mod: CPTII,S$GLB,, | Performed by: INTERNAL MEDICINE

## 2020-08-10 NOTE — PROGRESS NOTES
Subjective:       Patient ID: Alexander Patel is a 75 y.o. White male who presents for re-evaluation of progression of Chronic Kidney Disease    HPI       He reports he is doing well. He is active but no routine exercise. No LE edema and no SOB. Appetite is fine and weight is stable. He feels he stays hydrated    Interval history August 2020: He saw Dr. Rasmussen as pt accidentally drove to [place] thinking his appt with me was there. On July 2nd he went to ER for lightheadedness and was found to have low BP and ZEINA. His ace was stopped and he is now on Flomax QOD. His BP has improved as have his symptoms. His repeat sCr improved a little.      Review of Systems   Constitutional: Negative for activity change, appetite change, fatigue and unexpected weight change.   HENT: Negative for facial swelling.    Respiratory: Negative for shortness of breath.    Cardiovascular: Negative for chest pain and leg swelling.   Gastrointestinal: Negative for abdominal pain, constipation and diarrhea.   Genitourinary: Positive for frequency. Negative for difficulty urinating, dysuria and hematuria.   Musculoskeletal: Negative for arthralgias.   Neurological: Negative for weakness.   Hematological: Does not bruise/bleed easily.   Psychiatric/Behavioral: Negative for decreased concentration.       Objective:      Physical Exam  Vitals signs and nursing note reviewed.   Constitutional:       General: He is not in acute distress.     Appearance: He is well-developed.   Neck:      Vascular: No JVD.   Cardiovascular:      Heart sounds: S1 normal and S2 normal. No friction rub.   Pulmonary:      Breath sounds: Normal breath sounds. No wheezing or rales.   Abdominal:      Palpations: Abdomen is soft.   Skin:     General: Skin is warm and dry.   Neurological:      Mental Status: He is alert and oriented to person, place, and time.         Assessment:       1. CKD (chronic kidney disease) stage 3, GFR 30-59 ml/min    2. ZEINA (acute kidney  "injury)    3. Hypotension, unspecified hypotension type    4. Benign prostatic hyperplasia, unspecified whether lower urinary tract symptoms present    5. Second degree heart block    6. Orthostatic hypotension        Plan:             ZEINA on CKD Stage 3  - from hypotension  - should improve further as BP improves  - does not appear "dehydrated"  - give more time for BPs to increase and recheck labs      Check labs 8/17        "

## 2020-08-17 ENCOUNTER — LAB VISIT (OUTPATIENT)
Dept: LAB | Facility: HOSPITAL | Age: 75
End: 2020-08-17
Attending: INTERNAL MEDICINE
Payer: MEDICARE

## 2020-08-17 ENCOUNTER — OFFICE VISIT (OUTPATIENT)
Dept: FAMILY MEDICINE | Facility: CLINIC | Age: 75
End: 2020-08-17
Payer: MEDICARE

## 2020-08-17 VITALS
HEIGHT: 66 IN | DIASTOLIC BLOOD PRESSURE: 62 MMHG | BODY MASS INDEX: 27.45 KG/M2 | WEIGHT: 170.81 LBS | SYSTOLIC BLOOD PRESSURE: 113 MMHG | HEART RATE: 60 BPM | TEMPERATURE: 98 F

## 2020-08-17 DIAGNOSIS — Z12.11 COLON CANCER SCREENING: ICD-10-CM

## 2020-08-17 DIAGNOSIS — E86.0 DEHYDRATION: ICD-10-CM

## 2020-08-17 DIAGNOSIS — R55 NEAR SYNCOPE: Primary | ICD-10-CM

## 2020-08-17 DIAGNOSIS — N17.9 AKI (ACUTE KIDNEY INJURY): ICD-10-CM

## 2020-08-17 LAB
ALBUMIN SERPL BCP-MCNC: 4 G/DL (ref 3.5–5.2)
ANION GAP SERPL CALC-SCNC: 8 MMOL/L (ref 8–16)
BUN SERPL-MCNC: 26 MG/DL (ref 8–23)
CALCIUM SERPL-MCNC: 9.5 MG/DL (ref 8.7–10.5)
CHLORIDE SERPL-SCNC: 107 MMOL/L (ref 95–110)
CO2 SERPL-SCNC: 26 MMOL/L (ref 23–29)
CREAT SERPL-MCNC: 1.5 MG/DL (ref 0.5–1.4)
EST. GFR  (AFRICAN AMERICAN): 51.5 ML/MIN/1.73 M^2
EST. GFR  (NON AFRICAN AMERICAN): 44.6 ML/MIN/1.73 M^2
GLUCOSE SERPL-MCNC: 115 MG/DL (ref 70–110)
PHOSPHATE SERPL-MCNC: 3.7 MG/DL (ref 2.7–4.5)
POTASSIUM SERPL-SCNC: 4.6 MMOL/L (ref 3.5–5.1)
SODIUM SERPL-SCNC: 141 MMOL/L (ref 136–145)

## 2020-08-17 PROCEDURE — 36415 COLL VENOUS BLD VENIPUNCTURE: CPT | Mod: PO

## 2020-08-17 PROCEDURE — 99214 PR OFFICE/OUTPT VISIT, EST, LEVL IV, 30-39 MIN: ICD-10-PCS | Mod: S$GLB,,, | Performed by: FAMILY MEDICINE

## 2020-08-17 PROCEDURE — 99214 OFFICE O/P EST MOD 30 MIN: CPT | Mod: S$GLB,,, | Performed by: FAMILY MEDICINE

## 2020-08-17 PROCEDURE — 80069 RENAL FUNCTION PANEL: CPT

## 2020-08-17 PROCEDURE — 99999 PR PBB SHADOW E&M-EST. PATIENT-LVL III: CPT | Mod: PBBFAC,,, | Performed by: FAMILY MEDICINE

## 2020-08-17 PROCEDURE — 99999 PR PBB SHADOW E&M-EST. PATIENT-LVL III: ICD-10-PCS | Mod: PBBFAC,,, | Performed by: FAMILY MEDICINE

## 2020-08-17 RX ORDER — SODIUM, POTASSIUM,MAG SULFATES 17.5-3.13G
SOLUTION, RECONSTITUTED, ORAL ORAL
Qty: 354 ML | Refills: 0 | Status: SHIPPED | OUTPATIENT
Start: 2020-08-17 | End: 2020-08-22 | Stop reason: SDUPTHER

## 2020-08-17 NOTE — PROGRESS NOTES
Subjective:      Patient ID: Alexander Patel is a 75 y.o. male.    Chief Complaint: Follow-up    Problem List Items Addressed This Visit     None      Visit Diagnoses     Dehydration        Colon cancer screening        Relevant Orders    Case request GI: COLONOSCOPY (Completed)    COVID-19 Routine Screening    Near syncope          HE HAD A NEAR SYNCOPE IN jULY AND HE HAS F/U WITH THE HEART DOC AND THE NEPHROLOGIST.  The heart doc stopped the lisinopril and Dr. Dai decreased the flomax.  He just had this done.  He has had a creatinine  Rechecked.     He is feeling better and he has not had further syncope noted.  He is drinking better now.  Nothing makes this worse or better. This was a new problem for him.  He is due for a colonoscopy at this time.  ER notes are below:  ED Provider Notes       Encounter Date: 7/2/2020        History           Chief Complaint   Patient presents with    Dizziness       C/o dizziness and near syncopal episode after standing up quickly about 1hour PTA. Pt reprots stumbling and falling to the floor. Denies hitting head and LOC. Pt AAOx4. Denies fever, cough, SOB.       Patient is a 75-year-old with history of BPH, essential hypertension, gout, presenting to the emergency department for evaluation of a near syncopal episode.  Reports he was sitting down and stood to get the front door when he collapsed, fell to the floor, did not completely lose consciousness, but did feel weak.  No seizure-like activity.  No bowel or bladder incontinence.  No preceding chest pain, abdominal pain, headaches.  Feels well at this time.  Does note that he has had decreased p.o. fluid intake.  No black or red in his stools.  No nausea, vomiting.  No shortness of breath.  No fevers                Review of patient's allergies indicates:   Allergen Reactions    No known drug allergies        Past Medical History:   Diagnosis Date    BPH (benign prostatic hyperplasia)      Essential hypertension  8/28/2015    Gout 2012    Hyperlipidemia LDL goal < 130              Past Surgical History:   Procedure Laterality Date    A-V CARDIAC PACEMAKER INSERTION N/A 8/23/2019     Procedure: INSERTION, CARDIAC PACEMAKER, DUAL CHAMBER;  Surgeon: Manas Meade MD;  Location: [facility] CATH;  Service: Cardiology;  Laterality: N/A;    COLONOSCOPY N/A 10/8/2015     Procedure: COLONOSCOPY;  Surgeon: Avinash Lamb MD;  Location: [facility] ENDO;  Service: Endoscopy;  Laterality: N/A;    FRACTURE SURGERY         hand      Family History   Problem Relation Age of Onset    Heart disease Mother      Alcohol abuse Father      Emphysema Father        Social History            Tobacco Use    Smoking status: Never Smoker    Smokeless tobacco: Former User       Types: Chew   Substance Use Topics    Alcohol use: Yes       Comment: occasional    Drug use: No      Review of Systems   Constitutional: Negative for fever.   HENT: Negative for sore throat.    Respiratory: Negative for shortness of breath.    Cardiovascular: Negative for chest pain.   Gastrointestinal: Negative for nausea.   Genitourinary: Negative for dysuria.   Musculoskeletal: Negative for back pain.   Skin: Negative for rash.   Neurological: Negative for weakness.        Near syncope   Hematological: Does not bruise/bleed easily.         Physical Exam             Initial Vitals [07/02/20 1659]   BP Pulse Resp Temp SpO2   124/63 61 16 97.9 °F (36.6 °C) 99 %       MAP           --              Physical Exam     Nursing note and vitals reviewed.  Constitutional: Vital signs are normal. He appears well-developed and well-nourished.  Non-toxic appearance. He does not have a sickly appearance. No distress.   HENT:   Head: Normocephalic and atraumatic.   Eyes: Conjunctivae, EOM and lids are normal. Pupils are equal, round, and reactive to light. Right conjunctiva is not injected. Left conjunctiva is not injected.   Neck: Trachea normal, normal range of motion, full  passive range of motion without pain and phonation normal.   Cardiovascular: Regular rhythm, normal heart sounds, intact distal pulses and normal pulses. Exam reveals no distant heart sounds and no friction rub.    Pulmonary/Chest: Breath sounds normal. No respiratory distress. He has no wheezes.   Abdominal: Soft. Normal appearance. He exhibits no distension, no fluid wave and no ascites. There is no abdominal tenderness.   Musculoskeletal: Normal range of motion. No tenderness or edema.      Right shoulder: He exhibits no tenderness, no deformity and normal pulse.   Neurological: He is alert and oriented to person, place, and time. He has normal strength. He is not disoriented.   NIH 0   Skin: Skin is warm, dry and intact. No pallor.   Psychiatric: He has a normal mood and affect.            ED Course   Procedures        Labs Reviewed   CBC W/ AUTO DIFFERENTIAL - Abnormal; Notable for the following components:       Result Value      RBC 3.97 (*)       Hemoglobin 11.9 (*)       Hematocrit 36.9 (*)       All other components within normal limits   COMPREHENSIVE METABOLIC PANEL - Abnormal; Notable for the following components:     Sodium 135 (*)       BUN, Bld 35 (*)       Creatinine 2.02 (*)       Anion Gap 6 (*)       eGFR if  36 (*)       eGFR if non  31 (*)       All other components within normal limits   MAGNESIUM   TROPONIN I             ECG Results                   EKG 12-lead (In process)  Result time 07/02/20 17:39:23                In process by Interface, Lab In [facility] (07/02/20 17:39:23)                               Narrative:     Test Reason : R42,     Vent. Rate : 060 BPM     Atrial Rate : 060 BPM     P-R Int : 256 ms          QRS Dur : 098 ms      QT Int : 398 ms       P-R-T Axes : 027 -10 020 degrees     QTc Int : 398 ms     Atrial-paced rhythm with prolonged AV conduction  Abnormal ECG  When compared with ECG of 21-AUG-2015 09:14,  Electronic atrial pacemaker has  replaced Sinus rhythm  QT has shortened     Referred By: [email or web address]   SELF           Confirmed By:                                            Imaging Results                   X-Ray Chest 1 View (Final result)  Result time 07/02/20 19:07:40                Final result by Lawrence Salmon MD (07/02/20 19:07:40)                           Impression:        1. No evidence of lobar type consolidation or acute cardiac decompensation noted.        Electronically signed by:       Lawrence Salmon MD  Date:                                                07/02/2020  Time:                                                19:07                         Narrative:     EXAMINATION:  XR CHEST 1 VIEW     CLINICAL HISTORY:  Dizziness, near syncope.     COMPARISON:  08/23/2019     FINDINGS:  The lungs demonstrate no evidence of lobar type consolidation, visible pneumothorax, or pleural fluid.  There is pacing device present.  The cardiac silhouette is within normal limits for size.   No acute displaced fracture is seen.                                                        ED Course as of Jul 03 1531   Thu Jul 02, 2020   1919 I have spoken to this patient extensively about his worsening kidney function, orthostatic symptoms.  I have told them that I can try to get him admitted given his worsening creatinine, presyncope at home, however they would like to be discharged.  They will follow up closely with their established nephrologist and cardiologist.  I have counseled him on fall precautions at home as well as very strict return precautions.  They would still like to be discharged at this time.  He is well-appearing.  Improving with fluids.  I have counseled the patient and family extensively on return precautions for worsening symptoms, indications for emergent re-evaluation, they voice understanding and agreement to the treatment plan, return precautions, follow up plan.      [TB]       ED Course User Index  [TB] Manoj Yu MD                   Clinical Impression:          ICD-10-CM ICD-9-CM   1. Elevated serum creatinine  R79.89 790.99   2. Dizziness  R42 780.4   3. Near syncope  R55 780.2                 ED Disposition Condition     Discharge Stable               ED Prescriptions           None                    Follow-up Information      Follow up With Specialties Details Why Contact Info     Keyshawn Dai MD Nephrology Call    [address]  [address]  [facility] [place] [facility] [address]  [phone]        Avinash Lamb MD Family Medicine Call    [id number] [facility] [address] [facility] [id number] [phone]                                   BMP  Lab Results   Component Value Date     08/06/2020    K 5.2 (H) 08/06/2020     08/06/2020    CO2 27 08/06/2020    BUN 30 (H) 08/06/2020    CREATININE 1.8 (H) 08/06/2020    CALCIUM 9.6 08/06/2020    ANIONGAP 6 (L) 08/06/2020    ESTGFRAFRICA 41.3 (A) 08/06/2020    EGFRNONAA 35.8 (A) 08/06/2020         Past Medical History:  Past Medical History:   Diagnosis Date    BPH (benign prostatic hyperplasia)     Essential hypertension 8/28/2015    Gout 2012    Hyperlipidemia LDL goal < 130      Past Surgical History:   Procedure Laterality Date    A-V CARDIAC PACEMAKER INSERTION N/A 8/23/2019    Procedure: INSERTION, CARDIAC PACEMAKER, DUAL CHAMBER;  Surgeon: Manas Meade MD;  Location: [facility] CATH;  Service: Cardiology;  Laterality: N/A;    COLONOSCOPY N/A 10/8/2015    Procedure: COLONOSCOPY;  Surgeon: Avinash Lamb MD;  Location: [facility] ENDO;  Service: Endoscopy;  Laterality: N/A;    FRACTURE SURGERY      hand     Review of patient's allergies indicates:   Allergen Reactions    No known drug allergies      Current Outpatient Medications on File Prior to Visit   Medication Sig Dispense Refill    aspirin 81 MG chewable tablet Take 81 mg by mouth once daily. Every day      GLUCOSAMINE HCL/CHONDR LAM A NA (OSTEO BI-FLEX ORAL) Take 2 tablets by mouth once daily.       ketoconazole  (NIZORAL) 2 % cream       multivit-min/FA/lycopen/lutein (CENTRUM SILVER MEN ORAL) Take 1 tablet by mouth once daily.      tamsulosin (FLOMAX) 0.4 mg Cap TAKE 1 CAPSULE EVERY OTHER DAY. (Patient taking differently: TAKE 1 CAPSULE EVERY OTHER THIRD DAY) 45 capsule 3     No current facility-administered medications on file prior to visit.      Social History     Socioeconomic History    Marital status:      Spouse name: Modesta    Number of children: 2    Years of education: Not on file    Highest education level: Not on file   Occupational History    Occupation: retired   Social Needs    Financial resource strain: Not on file    Food insecurity     Worry: Not on file     Inability: Not on file    Transportation needs     Medical: Not on file     Non-medical: Not on file   Tobacco Use    Smoking status: Never Smoker    Smokeless tobacco: Former User     Types: Chew   Substance and Sexual Activity    Alcohol use: Yes     Comment: occasional    Drug use: No    Sexual activity: Yes   Lifestyle    Physical activity     Days per week: Not on file     Minutes per session: Not on file    Stress: Not on file   Relationships    Social connections     Talks on phone: Not on file     Gets together: Not on file     Attends Rastafarian service: Not on file     Active member of club or organization: Not on file     Attends meetings of clubs or organizations: Not on file     Relationship status: Not on file   Other Topics Concern    Not on file   Social History Narrative    Not on file     Family History   Problem Relation Age of Onset    Heart disease Mother     Alcohol abuse Father     Emphysema Father        Review of Systems   Constitutional: Negative.  Negative for chills, diaphoresis and fever.   HENT: Negative for congestion, hearing loss, mouth sores, postnasal drip and sore throat.    Eyes: Negative for pain and visual disturbance.   Respiratory: Negative for cough, chest tightness, shortness of  "breath and wheezing.    Cardiovascular: Negative for chest pain.   Gastrointestinal: Negative for abdominal pain, anal bleeding, blood in stool, constipation, diarrhea, nausea and vomiting.   Genitourinary: Negative for dysuria and hematuria.   Musculoskeletal: Negative for back pain, neck pain and neck stiffness.   Skin: Negative for rash.   Neurological: Negative for dizziness and weakness.       Objective:     /62   Pulse 60   Temp 98 °F (36.7 °C)   Ht 5' 6" (1.676 m)   Wt 77.5 kg (170 lb 12.8 oz)   BMI 27.57 kg/m²     Physical Exam  Constitutional:       Appearance: He is well-developed. He is not diaphoretic.   HENT:      Head: Normocephalic and atraumatic.      Right Ear: External ear normal.      Left Ear: External ear normal.      Nose: Nose normal.      Mouth/Throat:      Pharynx: No oropharyngeal exudate.   Eyes:      General: No scleral icterus.        Right eye: No discharge.         Left eye: No discharge.      Conjunctiva/sclera: Conjunctivae normal.      Pupils: Pupils are equal, round, and reactive to light.   Neck:      Musculoskeletal: Normal range of motion and neck supple.      Thyroid: No thyromegaly.      Vascular: No JVD.   Cardiovascular:      Rate and Rhythm: Normal rate and regular rhythm.      Heart sounds: Normal heart sounds. No murmur. No friction rub. No gallop.    Pulmonary:      Effort: Pulmonary effort is normal. No respiratory distress.      Breath sounds: Normal breath sounds. No wheezing or rales.   Chest:      Chest wall: No tenderness.   Abdominal:      General: Bowel sounds are normal. There is no distension.      Palpations: Abdomen is soft. There is no mass.      Tenderness: There is no abdominal tenderness. There is no guarding or rebound.   Musculoskeletal: Normal range of motion.         General: No tenderness.   Lymphadenopathy:      Cervical: No cervical adenopathy.   Skin:     General: Skin is warm and dry.   Neurological:      Mental Status: He is alert and " oriented to person, place, and time.      Cranial Nerves: No cranial nerve deficit.      Coordination: Coordination normal.       Assessment:     1. Near syncope    2. Dehydration    3. Colon cancer screening        Plan:     Problem List Items Addressed This Visit     None      Visit Diagnoses     Near syncope    -  Primary    Dehydration        Colon cancer screening        Relevant Orders    COVID-19 Routine Screening        No follow-ups on file.      I am having Alexander Patel maintain his aspirin, GLUCOSAMINE HCL/CHONDR LAM A NA (OSTEO BI-FLEX ORAL), multivit-min/FA/lycopen/lutein (CENTRUM SILVER MEN ORAL), tamsulosin, and ketoconazole.    Alexander was seen today for follow-up.    Diagnoses and all orders for this visit:    Near syncope    Dehydration    Colon cancer screening  -     Case request GI: COLONOSCOPY  -     COVID-19 Routine Screening; Future  -     sodium,potassium,mag sulfates (SUPREP BOWEL PREP KIT) 17.5-3.13-1.6 gram SolR; Take as instructed on prep sheet         The patient was instructed to stop the following meds:  There are no discontinued medications.  Orders Placed This Encounter   Procedures    COVID-19 Routine Screening     Standing Status:   Future     Standing Expiration Date:   10/16/2021     Order Specific Question:   Is the patient symptomatic?     Answer:   No     Order Specific Question:   Is this needed for pre-procedure or pre-op testing?     Answer:   Yes     Order Specific Question:   Diagnosis:     Answer:   Preop testing [[id number]]

## 2020-08-22 ENCOUNTER — TELEPHONE (OUTPATIENT)
Dept: FAMILY MEDICINE | Facility: CLINIC | Age: 75
End: 2020-08-22

## 2020-08-22 DIAGNOSIS — Z12.11 COLON CANCER SCREENING: ICD-10-CM

## 2020-08-22 DIAGNOSIS — Z01.818 PREOP EXAMINATION: ICD-10-CM

## 2020-08-22 RX ORDER — SODIUM, POTASSIUM,MAG SULFATES 17.5-3.13G
SOLUTION, RECONSTITUTED, ORAL ORAL
Qty: 354 ML | Refills: 0 | Status: SHIPPED | OUTPATIENT
Start: 2020-08-22 | End: 2020-12-24

## 2020-08-27 ENCOUNTER — TELEPHONE (OUTPATIENT)
Dept: ENDOSCOPY | Facility: HOSPITAL | Age: 75
End: 2020-08-27

## 2020-09-01 ENCOUNTER — TELEPHONE (OUTPATIENT)
Dept: ENDOSCOPY | Facility: HOSPITAL | Age: 75
End: 2020-09-01

## 2020-09-01 NOTE — TELEPHONE ENCOUNTER
Location Screening:    If answers yes to any of the following, schedule at [place] ONLY. If No, OK for either location.    1. Is there a diagnosis of heart failure, severe heart valve disease (aortic stenosis) or mechanical valve? no  a. Is the Left Ventricle Ejection Fraction <30% ? no    2. Does the pt have pulmonary hypertension? no   a. Is pulmonary arterial pressure gradient >50mmHg? no   b. Is there evidence of right ventricular dysfunction? no    3. Does the pt have achalasia? no    4. Any history of negative reaction to anesthesia? no   a. Myasthenia gravis? no   b. Malignant hyperthermia? no   c. Other? no    5. Is procedure for esophageal banding? no      COVID Screening    1. Have you had a fever in the last 7 days or have you used fever reducing medicines for a fever in the last 7 days?  no    2. Are you experiencing shortness of breath, cough, muscle aches, loss of taste or loss of smell?  no    If answered yes to questions 1 and 2, the patient must seek medical attention with their PCP.  Do not schedule their procedure.     3. Are you residing with anyone who has tested positive for Covid?  no    If answered yes to question 3, recommend 14 day self-quarantine from the date of relative's positive test and place special needs note in the depot.  Wait to schedule.     ENDO screening    1. Have you been admitted for cardiac, kidney or pulmonary causes to the hospital in the past 3 months? no   If yes, schedule an appointment with PCP before scheduling endoscopic procedure.     2. Have you had a stent placed in the last 12 months? no   If yes, for a screening visit, cancel and message the ordering provider.  The patient will need a new order when the time is appropriate.     3. Have you had a stroke or heart attack in the past 6 months? no   If yes, cancel and refer patient to ordering provider for clearance, also message ordering provider to inform.     4. Have you had any chest pain in the past 3 months?  "no   If yes, Have you been evaluated by your PCP and/or cardiologist and it was determined to not be heart related? not applicable   If No, Pt needs to be seen by PCP or Cardiologist .  Pt can be scheduled once clearance obtained by either of those providers.     5. Do you take prescription weight loss medications?  no   If yes, must stop for 2 weeks prior to procedure.     6. Have you been diagnosed with diverticulitis within the past 3 months? no   If yes, must have been seen by GI within the last 3 months, if not schedule with GI [facility].    If pt has been seen by GI, schedule procedure 8-12 weeks post antibiotic treatment.     7. Are you on Dialysis? no  If yes, schedule procedure for the day AFTER dialysis.  Appt time should be 9am or later, patient arrival time is 2 hours prior.  Nulytely or miralax prep for all patients with kidney disease.     8. Are you diabetic?  no   If yes, schedule morning appt. Advise pt to hold all diabetic meds day of procedure.     9. If pt is older than 80 years of age and HAS NOT been seen by GI or PCP within the last 6 months, needs appt with GI [facility].   If pt has been seen by the GI provider or PCP within the past 6  months AND meets criteria, schedule procedure AND send message to the endoscopist.     10. Is patient on a "high risk" medication (blood thinner/antiplatelet agent)?  no   If yes, has cardiac clearance been obtained within the last 60 days? N/A   If no, a new clearance needs to be obtained.     Final Questions:    1.I have reviewed the last colonoscopy for recommendations regarding next procedure bowel prep.  yes  2. I have reviewed medications and allergies.  yes  3. I have verified the pharmacy information and appropriate prep sent if needed. yes  4. Prep instructions have been mailed or sent to portal per patient request. yes        All schedulers will have ability to reach out to the ordering GI provider to clarify any issues.     "

## 2020-09-03 ENCOUNTER — CLINICAL SUPPORT (OUTPATIENT)
Dept: CARDIOLOGY | Facility: CLINIC | Age: 75
End: 2020-09-03
Payer: MEDICARE

## 2020-09-03 DIAGNOSIS — Z95.0 PRESENCE OF CARDIAC PACEMAKER: ICD-10-CM

## 2020-09-03 PROCEDURE — 93296 REM INTERROG EVL PM/IDS: CPT | Mod: S$GLB,,, | Performed by: INTERNAL MEDICINE

## 2020-09-03 PROCEDURE — 93294 CARDIAC DEVICE CHECK - REMOTE: ICD-10-PCS | Mod: S$GLB,,, | Performed by: INTERNAL MEDICINE

## 2020-09-03 PROCEDURE — 93296 CARDIAC DEVICE CHECK - REMOTE: ICD-10-PCS | Mod: S$GLB,,, | Performed by: INTERNAL MEDICINE

## 2020-09-03 PROCEDURE — 93294 REM INTERROG EVL PM/LDLS PM: CPT | Mod: S$GLB,,, | Performed by: INTERNAL MEDICINE

## 2020-09-21 ENCOUNTER — TELEPHONE (OUTPATIENT)
Dept: CARDIOLOGY | Facility: CLINIC | Age: 75
End: 2020-09-21

## 2020-09-21 NOTE — TELEPHONE ENCOUNTER
Spoke with wife about scheduling pt's BIO PPM check.  They were away and asked for me to call them back at a later time.

## 2020-09-22 DIAGNOSIS — I44.1 SECOND DEGREE HEART BLOCK: ICD-10-CM

## 2020-09-22 DIAGNOSIS — Z95.0 CARDIAC PACEMAKER IN SITU: Primary | ICD-10-CM

## 2020-09-28 ENCOUNTER — TELEPHONE (OUTPATIENT)
Dept: FAMILY MEDICINE | Facility: CLINIC | Age: 75
End: 2020-09-28

## 2020-09-28 DIAGNOSIS — N18.30 CKD (CHRONIC KIDNEY DISEASE) STAGE 3, GFR 30-59 ML/MIN: Primary | ICD-10-CM

## 2020-09-30 ENCOUNTER — LAB VISIT (OUTPATIENT)
Dept: LAB | Facility: HOSPITAL | Age: 75
End: 2020-09-30
Attending: FAMILY MEDICINE
Payer: MEDICARE

## 2020-09-30 DIAGNOSIS — N18.30 CKD (CHRONIC KIDNEY DISEASE) STAGE 3, GFR 30-59 ML/MIN: ICD-10-CM

## 2020-09-30 LAB
ALBUMIN SERPL BCP-MCNC: 4.3 G/DL (ref 3.5–5.2)
ALP SERPL-CCNC: 63 U/L (ref 55–135)
ALT SERPL W/O P-5'-P-CCNC: 14 U/L (ref 10–44)
ANION GAP SERPL CALC-SCNC: 7 MMOL/L (ref 8–16)
AST SERPL-CCNC: 22 U/L (ref 10–40)
BILIRUB SERPL-MCNC: 0.6 MG/DL (ref 0.1–1)
BUN SERPL-MCNC: 23 MG/DL (ref 8–23)
CALCIUM SERPL-MCNC: 9.1 MG/DL (ref 8.7–10.5)
CHLORIDE SERPL-SCNC: 104 MMOL/L (ref 95–110)
CO2 SERPL-SCNC: 26 MMOL/L (ref 23–29)
CREAT SERPL-MCNC: 1.7 MG/DL (ref 0.5–1.4)
EST. GFR  (AFRICAN AMERICAN): 44.3 ML/MIN/1.73 M^2
EST. GFR  (NON AFRICAN AMERICAN): 38.3 ML/MIN/1.73 M^2
GLUCOSE SERPL-MCNC: 102 MG/DL (ref 70–110)
POTASSIUM SERPL-SCNC: 4.6 MMOL/L (ref 3.5–5.1)
PROT SERPL-MCNC: 7.2 G/DL (ref 6–8.4)
SODIUM SERPL-SCNC: 137 MMOL/L (ref 136–145)

## 2020-09-30 PROCEDURE — 36415 COLL VENOUS BLD VENIPUNCTURE: CPT | Mod: PO

## 2020-09-30 PROCEDURE — 80053 COMPREHEN METABOLIC PANEL: CPT

## 2020-10-08 ENCOUNTER — TELEPHONE (OUTPATIENT)
Dept: ENDOSCOPY | Facility: HOSPITAL | Age: 75
End: 2020-10-08

## 2020-10-08 ENCOUNTER — TELEPHONE (OUTPATIENT)
Dept: FAMILY MEDICINE | Facility: CLINIC | Age: 75
End: 2020-10-08

## 2020-10-08 NOTE — TELEPHONE ENCOUNTER
----- Message from Kathrin Hines sent at 10/8/2020 10:01 AM CDT -----  Contact: [phone] wife ( j carlos )  Would like to cancel his appt on 10/27 for colonoscopy and also covid testing . Please call back at  [phone]. Thanks

## 2020-10-20 ENCOUNTER — PATIENT OUTREACH (OUTPATIENT)
Dept: ADMINISTRATIVE | Facility: OTHER | Age: 75
End: 2020-10-20

## 2020-10-20 NOTE — PROGRESS NOTES
LINKS immunization registry not responding  Care Everywhere updated  Health Maintenance updated  Chart reviewed for overdue Proactive [facility] Encounters (TITI) health maintenance testing (CRS, Breast Ca, Diabetic Eye Exam)   Orders entered:N/A

## 2020-10-21 ENCOUNTER — OFFICE VISIT (OUTPATIENT)
Dept: CARDIOLOGY | Facility: CLINIC | Age: 75
End: 2020-10-21
Payer: MEDICARE

## 2020-10-21 ENCOUNTER — CLINICAL SUPPORT (OUTPATIENT)
Dept: CARDIOLOGY | Facility: CLINIC | Age: 75
End: 2020-10-21
Attending: INTERNAL MEDICINE
Payer: MEDICARE

## 2020-10-21 VITALS
DIASTOLIC BLOOD PRESSURE: 89 MMHG | BODY MASS INDEX: 26.82 KG/M2 | WEIGHT: 166.88 LBS | SYSTOLIC BLOOD PRESSURE: 163 MMHG | HEIGHT: 66 IN | HEART RATE: 80 BPM

## 2020-10-21 DIAGNOSIS — I10 ESSENTIAL HYPERTENSION: Primary | ICD-10-CM

## 2020-10-21 DIAGNOSIS — Z95.0 CARDIAC PACEMAKER IN SITU: ICD-10-CM

## 2020-10-21 DIAGNOSIS — I44.1 SECOND DEGREE HEART BLOCK: ICD-10-CM

## 2020-10-21 DIAGNOSIS — E78.5 HYPERLIPIDEMIA WITH TARGET LDL LESS THAN 130: ICD-10-CM

## 2020-10-21 DIAGNOSIS — R00.1 SINUS BRADYCARDIA, CHRONIC: ICD-10-CM

## 2020-10-21 PROCEDURE — 99999 PR PBB SHADOW E&M-EST. PATIENT-LVL III: ICD-10-PCS | Mod: PBBFAC,,, | Performed by: INTERNAL MEDICINE

## 2020-10-21 PROCEDURE — 3079F DIAST BP 80-89 MM HG: CPT | Mod: CPTII,S$GLB,, | Performed by: INTERNAL MEDICINE

## 2020-10-21 PROCEDURE — 3079F PR MOST RECENT DIASTOLIC BLOOD PRESSURE 80-89 MM HG: ICD-10-PCS | Mod: CPTII,S$GLB,, | Performed by: INTERNAL MEDICINE

## 2020-10-21 PROCEDURE — 3077F SYST BP >= 140 MM HG: CPT | Mod: CPTII,S$GLB,, | Performed by: INTERNAL MEDICINE

## 2020-10-21 PROCEDURE — 1159F MED LIST DOCD IN RCRD: CPT | Mod: S$GLB,,, | Performed by: INTERNAL MEDICINE

## 2020-10-21 PROCEDURE — 3077F PR MOST RECENT SYSTOLIC BLOOD PRESSURE >= 140 MM HG: ICD-10-PCS | Mod: CPTII,S$GLB,, | Performed by: INTERNAL MEDICINE

## 2020-10-21 PROCEDURE — 1101F PT FALLS ASSESS-DOCD LE1/YR: CPT | Mod: CPTII,S$GLB,, | Performed by: INTERNAL MEDICINE

## 2020-10-21 PROCEDURE — 99214 PR OFFICE/OUTPT VISIT, EST, LEVL IV, 30-39 MIN: ICD-10-PCS | Mod: S$GLB,,, | Performed by: INTERNAL MEDICINE

## 2020-10-21 PROCEDURE — 99999 PR PBB SHADOW E&M-EST. PATIENT-LVL III: CPT | Mod: PBBFAC,,, | Performed by: INTERNAL MEDICINE

## 2020-10-21 PROCEDURE — 1159F PR MEDICATION LIST DOCUMENTED IN MEDICAL RECORD: ICD-10-PCS | Mod: S$GLB,,, | Performed by: INTERNAL MEDICINE

## 2020-10-21 PROCEDURE — 99214 OFFICE O/P EST MOD 30 MIN: CPT | Mod: S$GLB,,, | Performed by: INTERNAL MEDICINE

## 2020-10-21 PROCEDURE — 1126F PR PAIN SEVERITY QUANTIFIED, NO PAIN PRESENT: ICD-10-PCS | Mod: S$GLB,,, | Performed by: INTERNAL MEDICINE

## 2020-10-21 PROCEDURE — 1126F AMNT PAIN NOTED NONE PRSNT: CPT | Mod: S$GLB,,, | Performed by: INTERNAL MEDICINE

## 2020-10-21 PROCEDURE — 1101F PR PT FALLS ASSESS DOC 0-1 FALLS W/OUT INJ PAST YR: ICD-10-PCS | Mod: CPTII,S$GLB,, | Performed by: INTERNAL MEDICINE

## 2020-12-02 ENCOUNTER — CLINICAL SUPPORT (OUTPATIENT)
Dept: CARDIOLOGY | Facility: CLINIC | Age: 75
End: 2020-12-02
Payer: MEDICARE

## 2020-12-02 DIAGNOSIS — Z95.0 PRESENCE OF CARDIAC PACEMAKER: ICD-10-CM

## 2020-12-02 PROCEDURE — 93296 CARDIAC DEVICE CHECK - REMOTE: ICD-10-PCS | Mod: S$GLB,,, | Performed by: INTERNAL MEDICINE

## 2020-12-02 PROCEDURE — 93296 REM INTERROG EVL PM/IDS: CPT | Mod: S$GLB,,, | Performed by: INTERNAL MEDICINE

## 2020-12-02 PROCEDURE — 93294 REM INTERROG EVL PM/LDLS PM: CPT | Mod: S$GLB,,, | Performed by: INTERNAL MEDICINE

## 2020-12-02 PROCEDURE — 93294 CARDIAC DEVICE CHECK - REMOTE: ICD-10-PCS | Mod: S$GLB,,, | Performed by: INTERNAL MEDICINE

## 2020-12-07 ENCOUNTER — LAB VISIT (OUTPATIENT)
Dept: LAB | Facility: HOSPITAL | Age: 75
End: 2020-12-07
Attending: FAMILY MEDICINE
Payer: MEDICARE

## 2020-12-07 ENCOUNTER — OFFICE VISIT (OUTPATIENT)
Dept: FAMILY MEDICINE | Facility: CLINIC | Age: 75
End: 2020-12-07
Payer: MEDICARE

## 2020-12-07 VITALS
HEIGHT: 66 IN | DIASTOLIC BLOOD PRESSURE: 70 MMHG | SYSTOLIC BLOOD PRESSURE: 138 MMHG | HEART RATE: 65 BPM | BODY MASS INDEX: 26.52 KG/M2 | TEMPERATURE: 98 F | WEIGHT: 165 LBS

## 2020-12-07 DIAGNOSIS — M10.9 ACUTE GOUT OF LEFT FOOT, UNSPECIFIED CAUSE: ICD-10-CM

## 2020-12-07 DIAGNOSIS — N18.30 STAGE 3 CHRONIC KIDNEY DISEASE, UNSPECIFIED WHETHER STAGE 3A OR 3B CKD: ICD-10-CM

## 2020-12-07 DIAGNOSIS — M10.9 ACUTE GOUT OF LEFT FOOT, UNSPECIFIED CAUSE: Primary | ICD-10-CM

## 2020-12-07 PROCEDURE — 1101F PT FALLS ASSESS-DOCD LE1/YR: CPT | Mod: CPTII,S$GLB,, | Performed by: FAMILY MEDICINE

## 2020-12-07 PROCEDURE — 1157F ADVNC CARE PLAN IN RCRD: CPT | Mod: S$GLB,,, | Performed by: FAMILY MEDICINE

## 2020-12-07 PROCEDURE — 99999 PR PBB SHADOW E&M-EST. PATIENT-LVL III: CPT | Mod: PBBFAC,,, | Performed by: FAMILY MEDICINE

## 2020-12-07 PROCEDURE — 1126F AMNT PAIN NOTED NONE PRSNT: CPT | Mod: S$GLB,,, | Performed by: FAMILY MEDICINE

## 2020-12-07 PROCEDURE — 3288F PR FALLS RISK ASSESSMENT DOCUMENTED: ICD-10-PCS | Mod: CPTII,S$GLB,, | Performed by: FAMILY MEDICINE

## 2020-12-07 PROCEDURE — 3075F SYST BP GE 130 - 139MM HG: CPT | Mod: CPTII,S$GLB,, | Performed by: FAMILY MEDICINE

## 2020-12-07 PROCEDURE — 80048 BASIC METABOLIC PNL TOTAL CA: CPT

## 2020-12-07 PROCEDURE — 3078F PR MOST RECENT DIASTOLIC BLOOD PRESSURE < 80 MM HG: ICD-10-PCS | Mod: CPTII,S$GLB,, | Performed by: FAMILY MEDICINE

## 2020-12-07 PROCEDURE — 1126F PR PAIN SEVERITY QUANTIFIED, NO PAIN PRESENT: ICD-10-PCS | Mod: S$GLB,,, | Performed by: FAMILY MEDICINE

## 2020-12-07 PROCEDURE — 1101F PR PT FALLS ASSESS DOC 0-1 FALLS W/OUT INJ PAST YR: ICD-10-PCS | Mod: CPTII,S$GLB,, | Performed by: FAMILY MEDICINE

## 2020-12-07 PROCEDURE — 99213 PR OFFICE/OUTPT VISIT, EST, LEVL III, 20-29 MIN: ICD-10-PCS | Mod: S$GLB,,, | Performed by: FAMILY MEDICINE

## 2020-12-07 PROCEDURE — 36415 COLL VENOUS BLD VENIPUNCTURE: CPT | Mod: PO

## 2020-12-07 PROCEDURE — 84550 ASSAY OF BLOOD/URIC ACID: CPT

## 2020-12-07 PROCEDURE — 3078F DIAST BP <80 MM HG: CPT | Mod: CPTII,S$GLB,, | Performed by: FAMILY MEDICINE

## 2020-12-07 PROCEDURE — 1159F MED LIST DOCD IN RCRD: CPT | Mod: S$GLB,,, | Performed by: FAMILY MEDICINE

## 2020-12-07 PROCEDURE — 1159F PR MEDICATION LIST DOCUMENTED IN MEDICAL RECORD: ICD-10-PCS | Mod: S$GLB,,, | Performed by: FAMILY MEDICINE

## 2020-12-07 PROCEDURE — 1157F PR ADVANCE CARE PLAN OR EQUIV PRESENT IN MEDICAL RECORD: ICD-10-PCS | Mod: S$GLB,,, | Performed by: FAMILY MEDICINE

## 2020-12-07 PROCEDURE — 3075F PR MOST RECENT SYSTOLIC BLOOD PRESS GE 130-139MM HG: ICD-10-PCS | Mod: CPTII,S$GLB,, | Performed by: FAMILY MEDICINE

## 2020-12-07 PROCEDURE — 99999 PR PBB SHADOW E&M-EST. PATIENT-LVL III: ICD-10-PCS | Mod: PBBFAC,,, | Performed by: FAMILY MEDICINE

## 2020-12-07 PROCEDURE — 99213 OFFICE O/P EST LOW 20 MIN: CPT | Mod: S$GLB,,, | Performed by: FAMILY MEDICINE

## 2020-12-07 PROCEDURE — 3288F FALL RISK ASSESSMENT DOCD: CPT | Mod: CPTII,S$GLB,, | Performed by: FAMILY MEDICINE

## 2020-12-07 RX ORDER — COLCHICINE 0.6 MG/1
TABLET ORAL
Qty: 15 TABLET | Refills: 0 | Status: SHIPPED | OUTPATIENT
Start: 2020-12-07 | End: 2021-09-15

## 2020-12-07 NOTE — PROGRESS NOTES
Complains of pain at the left foot which started last night.  Denies any injury.  He is able to walk and bear weight with some discomfort.  He has previous history of gout though has not had symptoms in several years.  He has stage 3 chronic kidney disease.    Alexander was seen today for foot pain.    Diagnoses and all orders for this visit:    Acute gout of left foot, unspecified cause  -     Uric Acid; Future  -     Basic Metabolic Panel; Future    Stage 3 chronic kidney disease, unspecified whether stage 3a or 3b CKD  -     Uric Acid; Future  -     Basic Metabolic Panel; Future    Other orders  -     colchicine (COLCRYS) 0.6 mg tablet; Take 1 tablet (0.6 mg total) by mouth every 8 (eight) hours for 1 day, THEN 1 tablet (0.6 mg total) 2 (two) times daily as needed.     Follow-up PCP if symptoms worsen or if not improving over the next few days.              Past Medical History:  Past Medical History:   Diagnosis Date    BPH (benign prostatic hyperplasia)     Essential hypertension 8/28/2015    Gout 2012    Hyperlipidemia LDL goal < 130      Past Surgical History:   Procedure Laterality Date    A-V CARDIAC PACEMAKER INSERTION N/A 8/23/2019    Procedure: INSERTION, CARDIAC PACEMAKER, DUAL CHAMBER;  Surgeon: Manas Meade MD;  Location[facility] CATH;  Service: Cardiology;  Laterality: N/A;    COLONOSCOPY N/A 10/8/2015    Procedure: COLONOSCOPY;  Surgeon: Avinash Lamb MD;  Location: [facility] ENDO;  Service: Endoscopy;  Laterality: N/A;    FRACTURE SURGERY      hand     Review of patient's allergies indicates:   Allergen Reactions    No known drug allergies      Current Outpatient Medications on File Prior to Visit   Medication Sig Dispense Refill    aspirin 81 MG chewable tablet Take 81 mg by mouth once daily. Every day      GLUCOSAMINE HCL/CHONDR LAM A NA (OSTEO BI-FLEX ORAL) Take 2 tablets by mouth once daily.       multivit-min/FA/lycopen/lutein (CENTRUM SILVER MEN ORAL) Take 1 tablet by mouth  once daily.      tamsulosin (FLOMAX) 0.4 mg Cap TAKE 1 CAPSULE EVERY OTHER DAY. (Patient taking differently: TAKE 1 CAPSULE EVERY OTHER THIRD DAY) 45 capsule 3    ketoconazole (NIZORAL) 2 % cream       sodium,potassium,mag sulfates (SUPREP BOWEL PREP KIT) 17.5-3.13-1.6 gram SolR Take as instructed on prep sheet (Patient not taking: Reported on 12/7/2020) 354 mL 0     No current facility-administered medications on file prior to visit.      Social History     Socioeconomic History    Marital status:      Spouse name: Modesta    Number of children: 2    Years of education: Not on file    Highest education level: Not on file   Occupational History    Occupation: retired   Social Needs    Financial resource strain: Not on file    Food insecurity     Worry: Not on file     Inability: Not on file    Transportation needs     Medical: Not on file     Non-medical: Not on file   Tobacco Use    Smoking status: Never Smoker    Smokeless tobacco: Former User     Types: Chew   Substance and Sexual Activity    Alcohol use: Yes     Comment: occasional    Drug use: No    Sexual activity: Yes   Lifestyle    Physical activity     Days per week: Not on file     Minutes per session: Not on file    Stress: Not on file   Relationships    Social connections     Talks on phone: Not on file     Gets together: Not on file     Attends Denominational service: Not on file     Active member of club or organization: Not on file     Attends meetings of clubs or organizations: Not on file     Relationship status: Not on file   Other Topics Concern    Not on file   Social History Narrative    Not on file     Family History   Problem Relation Age of Onset    Heart disease Mother     Alcohol abuse Father     Emphysema Father            ROS:  GENERAL: No fever, chills,  or significant weight changes.   CARDIOVASCULAR: Denies chest pain, PND, orthopnea or reduced exercise tolerance.  ABDOMEN: Appetite fine. Denies diarrhea,  "abdominal pain, hematemesis or blood in stool.  URINARY: No flank pain, dysuria or hematuria.    Vitals:    12/07/20 1453   BP: 138/70   Pulse: 65   Temp: 97.9 °F (36.6 °C)   Weight: 74.8 kg (165 lb)   Height: 5' 6" (1.676 m)       Wt Readings from Last 3 Encounters:   12/07/20 74.8 kg (165 lb)   10/21/20 75.7 kg (166 lb 14.2 oz)   08/17/20 77.5 kg (170 lb 12.8 oz)       APPEARANCE: Well nourished, well developed, in no acute distress.    HEAD: Normocephalic.  Atraumatic.  EYES:   Right eye: Pupil reactive.  Conjunctiva clear.    Left eye: Pupil reactive.  Conjunctiva clear.    NECK: Supple. No bruits.  No JVD.  No cervical lymphadenopathy.  No thyromegaly.    CHEST: Breath sounds clear bilaterally.  Normal respiratory effort  CARDIOVASCULAR: Normal rate.  Regular rhythm.  No murmurs.  No rub.  No gallops.   No edema.  MENTAL STATUS: Alert.  Oriented x 3.  There is some swelling at the left foot.  There is mild erythema.  He has tenderness over the midfoot laterally.  There is no tenderness over the base of the 5th metatarsal.  He has a bunion at the 1st metatarsal.  There is no fluctuance or discharge.  No skin lesions.  "

## 2020-12-08 LAB
ANION GAP SERPL CALC-SCNC: 13 MMOL/L (ref 8–16)
BUN SERPL-MCNC: 21 MG/DL (ref 8–23)
CALCIUM SERPL-MCNC: 9.5 MG/DL (ref 8.7–10.5)
CHLORIDE SERPL-SCNC: 106 MMOL/L (ref 95–110)
CO2 SERPL-SCNC: 23 MMOL/L (ref 23–29)
CREAT SERPL-MCNC: 1.4 MG/DL (ref 0.5–1.4)
EST. GFR  (AFRICAN AMERICAN): 56 ML/MIN/1.73 M^2
EST. GFR  (NON AFRICAN AMERICAN): 48.5 ML/MIN/1.73 M^2
GLUCOSE SERPL-MCNC: 114 MG/DL (ref 70–110)
POTASSIUM SERPL-SCNC: 5.1 MMOL/L (ref 3.5–5.1)
SODIUM SERPL-SCNC: 142 MMOL/L (ref 136–145)
URATE SERPL-MCNC: 7.9 MG/DL (ref 3.4–7)

## 2020-12-09 ENCOUNTER — TELEPHONE (OUTPATIENT)
Dept: NEPHROLOGY | Facility: CLINIC | Age: 75
End: 2020-12-09
Payer: MEDICARE

## 2020-12-09 NOTE — TELEPHONE ENCOUNTER
Wife reports taking Colchicine for gout. She is asking when patient should see you again. Please advise.

## 2020-12-09 NOTE — TELEPHONE ENCOUNTER
----- Message from Sophia Arora sent at 12/9/2020  9:50 AM CST -----  Contact: Patient's wife- Modesta  Please give patient's wife a call back concerning patient's Gout on his LT foot and, patient is concerned about the test. Please call at  [phone] ( Modesta)

## 2020-12-21 ENCOUNTER — PATIENT MESSAGE (OUTPATIENT)
Dept: CARDIOLOGY | Facility: CLINIC | Age: 75
End: 2020-12-21

## 2020-12-21 ENCOUNTER — TELEPHONE (OUTPATIENT)
Dept: CARDIOLOGY | Facility: CLINIC | Age: 75
End: 2020-12-21

## 2020-12-21 NOTE — TELEPHONE ENCOUNTER
New onset atrial fibrillation noted during device interrogation/device remote check:    Overall burden:  0% since 10/22/20    Max duration seen: 5hr 30min 20sec    Most recent episode: 11/22/20    Ventricular rates:   Controlled    70bpm    Anticoagulation status: none    Patient symptoms: Please advise if you would like for me to call patient      *see attached full report

## 2020-12-24 ENCOUNTER — OFFICE VISIT (OUTPATIENT)
Dept: FAMILY MEDICINE | Facility: CLINIC | Age: 75
End: 2020-12-24
Payer: MEDICARE

## 2020-12-24 VITALS
WEIGHT: 168 LBS | DIASTOLIC BLOOD PRESSURE: 80 MMHG | SYSTOLIC BLOOD PRESSURE: 138 MMHG | TEMPERATURE: 97 F | BODY MASS INDEX: 27 KG/M2 | HEART RATE: 61 BPM | HEIGHT: 66 IN

## 2020-12-24 DIAGNOSIS — Z83.719 FAMILY HISTORY OF COLONIC POLYPS: ICD-10-CM

## 2020-12-24 DIAGNOSIS — Z12.11 COLON CANCER SCREENING: ICD-10-CM

## 2020-12-24 DIAGNOSIS — M10.072 ACUTE IDIOPATHIC GOUT OF LEFT FOOT: Primary | ICD-10-CM

## 2020-12-24 DIAGNOSIS — Z86.010 HISTORY OF COLON POLYPS: ICD-10-CM

## 2020-12-24 DIAGNOSIS — E79.0 HYPERURICEMIA: ICD-10-CM

## 2020-12-24 PROCEDURE — 1101F PR PT FALLS ASSESS DOC 0-1 FALLS W/OUT INJ PAST YR: ICD-10-PCS | Mod: CPTII,S$GLB,, | Performed by: FAMILY MEDICINE

## 2020-12-24 PROCEDURE — 1159F PR MEDICATION LIST DOCUMENTED IN MEDICAL RECORD: ICD-10-PCS | Mod: S$GLB,,, | Performed by: FAMILY MEDICINE

## 2020-12-24 PROCEDURE — 3288F FALL RISK ASSESSMENT DOCD: CPT | Mod: CPTII,S$GLB,, | Performed by: FAMILY MEDICINE

## 2020-12-24 PROCEDURE — 99214 OFFICE O/P EST MOD 30 MIN: CPT | Mod: S$GLB,,, | Performed by: FAMILY MEDICINE

## 2020-12-24 PROCEDURE — 1126F PR PAIN SEVERITY QUANTIFIED, NO PAIN PRESENT: ICD-10-PCS | Mod: S$GLB,,, | Performed by: FAMILY MEDICINE

## 2020-12-24 PROCEDURE — 3075F SYST BP GE 130 - 139MM HG: CPT | Mod: CPTII,S$GLB,, | Performed by: FAMILY MEDICINE

## 2020-12-24 PROCEDURE — 3288F PR FALLS RISK ASSESSMENT DOCUMENTED: ICD-10-PCS | Mod: CPTII,S$GLB,, | Performed by: FAMILY MEDICINE

## 2020-12-24 PROCEDURE — 99214 PR OFFICE/OUTPT VISIT, EST, LEVL IV, 30-39 MIN: ICD-10-PCS | Mod: S$GLB,,, | Performed by: FAMILY MEDICINE

## 2020-12-24 PROCEDURE — 3079F DIAST BP 80-89 MM HG: CPT | Mod: CPTII,S$GLB,, | Performed by: FAMILY MEDICINE

## 2020-12-24 PROCEDURE — 1159F MED LIST DOCD IN RCRD: CPT | Mod: S$GLB,,, | Performed by: FAMILY MEDICINE

## 2020-12-24 PROCEDURE — 1157F PR ADVANCE CARE PLAN OR EQUIV PRESENT IN MEDICAL RECORD: ICD-10-PCS | Mod: S$GLB,,, | Performed by: FAMILY MEDICINE

## 2020-12-24 PROCEDURE — 1126F AMNT PAIN NOTED NONE PRSNT: CPT | Mod: S$GLB,,, | Performed by: FAMILY MEDICINE

## 2020-12-24 PROCEDURE — 3079F PR MOST RECENT DIASTOLIC BLOOD PRESSURE 80-89 MM HG: ICD-10-PCS | Mod: CPTII,S$GLB,, | Performed by: FAMILY MEDICINE

## 2020-12-24 PROCEDURE — 99999 PR PBB SHADOW E&M-EST. PATIENT-LVL III: ICD-10-PCS | Mod: PBBFAC,,, | Performed by: FAMILY MEDICINE

## 2020-12-24 PROCEDURE — 1101F PT FALLS ASSESS-DOCD LE1/YR: CPT | Mod: CPTII,S$GLB,, | Performed by: FAMILY MEDICINE

## 2020-12-24 PROCEDURE — 1157F ADVNC CARE PLAN IN RCRD: CPT | Mod: S$GLB,,, | Performed by: FAMILY MEDICINE

## 2020-12-24 PROCEDURE — 3075F PR MOST RECENT SYSTOLIC BLOOD PRESS GE 130-139MM HG: ICD-10-PCS | Mod: CPTII,S$GLB,, | Performed by: FAMILY MEDICINE

## 2020-12-24 PROCEDURE — 99999 PR PBB SHADOW E&M-EST. PATIENT-LVL III: CPT | Mod: PBBFAC,,, | Performed by: FAMILY MEDICINE

## 2020-12-24 RX ORDER — SODIUM, POTASSIUM,MAG SULFATES 17.5-3.13G
SOLUTION, RECONSTITUTED, ORAL ORAL
Qty: 354 ML | Refills: 0 | Status: SHIPPED | OUTPATIENT
Start: 2020-12-24 | End: 2021-09-15

## 2020-12-24 RX ORDER — ALLOPURINOL 100 MG/1
100 TABLET ORAL DAILY
Qty: 90 TABLET | Refills: 3 | Status: SHIPPED | OUTPATIENT
Start: 2020-12-24 | End: 2021-09-15

## 2020-12-24 NOTE — PROGRESS NOTES
Subjective:      Patient ID: Alexander Patel is a 75 y.o. male.    Chief Complaint: Gout  he had a recent flare of his gout and he was put on medicine. We reviewed his labs.   Lab Results   Component Value Date    URICACID 7.9 (H) 12/07/2020     He is feeling better t THIS TIME AND HE HAS TOLERATED THE COLCRYS.  NOTHING MAKES IT FLARE.    Problem List Items Addressed This Visit     History of colon polyps    Relevant Medications    sodium,potassium,mag sulfates (SUPREP BOWEL PREP KIT) 17.5-3.13-1.6 gram SolR    Other Relevant Orders    Case Request Endoscopy: COLONOSCOPY (Completed)    COVID-19 Routine Screening      Other Visit Diagnoses     Acute idiopathic gout of left foot    -  Primary    Relevant Medications    allopurinoL (ZYLOPRIM) 100 MG tablet    Hyperuricemia        Relevant Medications    allopurinoL (ZYLOPRIM) 100 MG tablet    Colon cancer screening        Relevant Medications    sodium,potassium,mag sulfates (SUPREP BOWEL PREP KIT) 17.5-3.13-1.6 gram SolR    Other Relevant Orders    Case Request Endoscopy: COLONOSCOPY (Completed)    COVID-19 Routine Screening    Family history of colonic polyps        Relevant Medications    sodium,potassium,mag sulfates (SUPREP BOWEL PREP KIT) 17.5-3.13-1.6 gram SolR    Other Relevant Orders    Case Request Endoscopy: COLONOSCOPY (Completed)    COVID-19 Routine Screening      HE HAS HAD POLYPS IN THE FAMILY HISTORY AND HE IS DUE FOR A RECHECK NOW.         Past Medical History:  Past Medical History:   Diagnosis Date    BPH (benign prostatic hyperplasia)     Essential hypertension 8/28/2015    Gout 2012    Hyperlipidemia LDL goal < 130      Past Surgical History:   Procedure Laterality Date    A-V CARDIAC PACEMAKER INSERTION N/A 8/23/2019    Procedure: INSERTION, CARDIAC PACEMAKER, DUAL CHAMBER;  Surgeon: Manas Meade MD;  Location: [facility];  Service: Cardiology;  Laterality: N/A;    COLONOSCOPY N/A 10/8/2015    Procedure: COLONOSCOPY;  Surgeon: Avinash IRIZARRY  MD Zainab;  Location: [facility];  Service: Endoscopy;  Laterality: N/A;    FRACTURE SURGERY      hand     Review of patient's allergies indicates:   Allergen Reactions    No known drug allergies      Current Outpatient Medications on File Prior to Visit   Medication Sig Dispense Refill    aspirin 81 MG chewable tablet Take 81 mg by mouth once daily. Every day      colchicine (COLCRYS) 0.6 mg tablet Take 1 tablet (0.6 mg total) by mouth every 8 (eight) hours for 1 day, THEN 1 tablet (0.6 mg total) 2 (two) times daily as needed. 15 tablet 0    GLUCOSAMINE HCL/CHONDR LAM A NA (OSTEO BI-FLEX ORAL) Take 2 tablets by mouth once daily.       ketoconazole (NIZORAL) 2 % cream       multivit-min/FA/lycopen/lutein (CENTRUM SILVER MEN ORAL) Take 1 tablet by mouth once daily.      tamsulosin (FLOMAX) 0.4 mg Cap TAKE 1 CAPSULE EVERY OTHER DAY. (Patient taking differently: TAKE 1 CAPSULE EVERY OTHER THIRD DAY) 45 capsule 3    [DISCONTINUED] sodium,potassium,mag sulfates (SUPREP BOWEL PREP KIT) 17.5-3.13-1.6 gram SolR Take as instructed on prep sheet (Patient not taking: Reported on 12/7/2020) 354 mL 0     No current facility-administered medications on file prior to visit.      Social History     Socioeconomic History    Marital status:      Spouse name: Modesta    Number of children: 2    Years of education: Not on file    Highest education level: Not on file   Occupational History    Occupation: retired   Social Needs    Financial resource strain: Not on file    Food insecurity     Worry: Not on file     Inability: Not on file    Transportation needs     Medical: Not on file     Non-medical: Not on file   Tobacco Use    Smoking status: Never Smoker    Smokeless tobacco: Former User     Types: Chew   Substance and Sexual Activity    Alcohol use: Yes     Comment: occasional    Drug use: No    Sexual activity: Yes   Lifestyle    Physical activity     Days per week: Not on file     Minutes per session:  "Not on file    Stress: Not on file   Relationships    Social connections     Talks on phone: Not on file     Gets together: Not on file     Attends Mu-ism service: Not on file     Active member of club or organization: Not on file     Attends meetings of clubs or organizations: Not on file     Relationship status: Not on file   Other Topics Concern    Not on file   Social History Narrative    Not on file     Family History   Problem Relation Age of Onset    Heart disease Mother     Alcohol abuse Father     Emphysema Father        Review of Systems   Constitutional: Negative.  Negative for chills, diaphoresis and fever.   HENT: Negative for congestion, hearing loss, mouth sores, postnasal drip and sore throat.    Eyes: Negative for pain and visual disturbance.   Respiratory: Negative for cough, chest tightness, shortness of breath and wheezing.    Cardiovascular: Negative for chest pain.   Gastrointestinal: Negative for abdominal pain, anal bleeding, blood in stool, constipation, diarrhea, nausea and vomiting.   Genitourinary: Negative for dysuria and hematuria.   Musculoskeletal: Positive for arthralgias. Negative for back pain, neck pain and neck stiffness.   Skin: Negative for rash.   Neurological: Negative for dizziness and weakness.       Objective:     /80   Pulse 61   Temp 97.3 °F (36.3 °C)   Ht 5' 6" (1.676 m)   Wt 76.2 kg (168 lb)   BMI 27.12 kg/m²     Physical Exam  Constitutional:       Appearance: He is well-developed. He is not diaphoretic.   HENT:      Head: Normocephalic and atraumatic.      Right Ear: External ear normal.      Left Ear: External ear normal.      Nose: Nose normal.      Mouth/Throat:      Pharynx: No oropharyngeal exudate.   Eyes:      General: No scleral icterus.        Right eye: No discharge.         Left eye: No discharge.      Conjunctiva/sclera: Conjunctivae normal.      Pupils: Pupils are equal, round, and reactive to light.   Neck:      Musculoskeletal: " Normal range of motion and neck supple.      Thyroid: No thyromegaly.      Vascular: No JVD.   Cardiovascular:      Rate and Rhythm: Normal rate and regular rhythm.      Heart sounds: Normal heart sounds. No murmur. No friction rub. No gallop.    Pulmonary:      Effort: Pulmonary effort is normal. No respiratory distress.      Breath sounds: Normal breath sounds. No wheezing or rales.   Chest:      Chest wall: No tenderness.   Abdominal:      General: Bowel sounds are normal. There is no distension.      Palpations: Abdomen is soft. There is no mass.      Tenderness: There is no abdominal tenderness. There is no guarding or rebound.   Musculoskeletal:      Left ankle: He exhibits normal range of motion, no swelling, no ecchymosis and no deformity. Tenderness.      Comments: Hands, wrists and elbows have no warmth, erythema or swelling noted.   Lymphadenopathy:      Cervical: No cervical adenopathy.   Skin:     General: Skin is warm and dry.   Neurological:      Mental Status: He is alert and oriented to person, place, and time.      Cranial Nerves: No cranial nerve deficit.      Coordination: Coordination normal.       Assessment:     1. Acute idiopathic gout of left foot    2. Hyperuricemia    3. History of colon polyps    4. Colon cancer screening    5. Family history of colonic polyps        Plan:     Problem List Items Addressed This Visit     History of colon polyps    Relevant Medications    sodium,potassium,mag sulfates (SUPREP BOWEL PREP KIT) 17.5-3.13-1.6 gram SolR    Other Relevant Orders    Case Request Endoscopy: COLONOSCOPY (Completed)    COVID-19 Routine Screening      Other Visit Diagnoses     Acute idiopathic gout of left foot    -  Primary    Relevant Medications    allopurinoL (ZYLOPRIM) 100 MG tablet    Hyperuricemia        Relevant Medications    allopurinoL (ZYLOPRIM) 100 MG tablet    Colon cancer screening        Relevant Medications    sodium,potassium,mag sulfates (SUPREP BOWEL PREP KIT)  17.5-3.13-1.6 gram SolR    Other Relevant Orders    Case Request Endoscopy: COLONOSCOPY (Completed)    COVID-19 Routine Screening    Family history of colonic polyps        Relevant Medications    sodium,potassium,mag sulfates (SUPREP BOWEL PREP KIT) 17.5-3.13-1.6 gram SolR    Other Relevant Orders    Case Request Endoscopy: COLONOSCOPY (Completed)    COVID-19 Routine Screening        No follow-ups on file.      I have discontinued Alexander Patel's SUPREP BOWEL PREP KIT. I am also having him start on allopurinoL and SUPREP BOWEL PREP KIT. Additionally, I am having him maintain his aspirin, GLUCOSAMINE HCL/CHONDR LAM A NA (OSTEO BI-FLEX ORAL), multivit-min/FA/lycopen/lutein (CENTRUM SILVER MEN ORAL), tamsulosin, ketoconazole, and colchicine.    Alexander was seen today for gout.    Diagnoses and all orders for this visit:    Acute idiopathic gout of left foot  -     allopurinoL (ZYLOPRIM) 100 MG tablet; Take 1 tablet (100 mg total) by mouth once daily.    Hyperuricemia  -     allopurinoL (ZYLOPRIM) 100 MG tablet; Take 1 tablet (100 mg total) by mouth once daily.    History of colon polyps  -     Case Request Endoscopy: COLONOSCOPY  -     COVID-19 Routine Screening; Future  -     sodium,potassium,mag sulfates (SUPREP BOWEL PREP KIT) 17.5-3.13-1.6 gram SolR; Take as instructed on prep sheet    Colon cancer screening  -     Case Request Endoscopy: COLONOSCOPY  -     COVID-19 Routine Screening; Future  -     sodium,potassium,mag sulfates (SUPREP BOWEL PREP KIT) 17.5-3.13-1.6 gram SolR; Take as instructed on prep sheet    Family history of colonic polyps  -     Case Request Endoscopy: COLONOSCOPY  -     COVID-19 Routine Screening; Future  -     sodium,potassium,mag sulfates (SUPREP BOWEL PREP KIT) 17.5-3.13-1.6 gram SolR; Take as instructed on prep sheet    START ALLOPURINOL NOW.   SOTP THE COLCRYS.  Medications Ordered This Encounter   Medications    allopurinoL (ZYLOPRIM) 100 MG tablet     Sig: Take 1 tablet (100 mg  total) by mouth once daily.     Dispense:  90 tablet     Refill:  3    sodium,potassium,mag sulfates (SUPREP BOWEL PREP KIT) 17.5-3.13-1.6 gram SolR     Sig: Take as instructed on prep sheet     Dispense:  354 mL     Refill:  0     The patient was instructed to stop the following meds:  Medications Discontinued During This Encounter   Medication Reason    sodium,potassium,mag sulfates (SUPREP BOWEL PREP KIT) 17.5-3.13-1.6 gram SolR      Orders Placed This Encounter   Procedures    COVID-19 Routine Screening     Standing Status:   Future     Standing Expiration Date:   2/22/2022     Order Specific Question:   Is the patient symptomatic?     Answer:   No     Order Specific Question:   Is this needed for pre-procedure or pre-op testing?     Answer:   Yes     Order Specific Question:   Diagnosis:     Answer:   Preop examination [744906]    Case Request Endoscopy: COLONOSCOPY     Order Specific Question:   Pre-op Diagnosis     Answer:   Colon cancer screening [902503]     Order Specific Question:   CPT Code:     Answer:   WI COLORECTAL CANCER SCREEN RESULTS DOCUMENT/REVIEW [3017F]     Order Specific Question:   Case Referring Provider     Answer:   MARIEL ANGUIANO [[id number]]     Order Specific Question:   CPT Code:     Answer:   WI COLON CA SCRN NOT HI RSK IND []     Order Specific Question:   Medical Necessity:     Answer:   Medically Non-Urgent [100]     Order Specific Question:   Is an on-site pathologist required for this procedure?     Answer:   N/A

## 2020-12-28 ENCOUNTER — PATIENT OUTREACH (OUTPATIENT)
Dept: ADMINISTRATIVE | Facility: OTHER | Age: 75
End: 2020-12-28

## 2020-12-28 ENCOUNTER — TELEPHONE (OUTPATIENT)
Dept: ENDOSCOPY | Facility: HOSPITAL | Age: 75
End: 2020-12-28

## 2020-12-28 NOTE — TELEPHONE ENCOUNTER
Attempted to contact patient to schedule colonoscopy procedure, no answer. Left message to call office, number provided. Message sent through patient portal.

## 2020-12-28 NOTE — PROGRESS NOTES
LINKS immunization registry not responding  Care Everywhere updated  Health Maintenance updated  Chart reviewed for overdue Proactive [facility] Encounters ([facility]) health maintenance testing (CRS, Breast Ca, Diabetic Eye Exam)   Orders entered:N/A  Patient has open case request for colonoscopy.

## 2020-12-30 ENCOUNTER — OFFICE VISIT (OUTPATIENT)
Dept: CARDIOLOGY | Facility: CLINIC | Age: 75
End: 2020-12-30
Payer: MEDICARE

## 2020-12-30 VITALS
DIASTOLIC BLOOD PRESSURE: 92 MMHG | HEIGHT: 66 IN | BODY MASS INDEX: 26.82 KG/M2 | WEIGHT: 166.88 LBS | SYSTOLIC BLOOD PRESSURE: 160 MMHG | HEART RATE: 59 BPM

## 2020-12-30 DIAGNOSIS — I10 ESSENTIAL HYPERTENSION: Primary | ICD-10-CM

## 2020-12-30 DIAGNOSIS — I44.1 SECOND DEGREE HEART BLOCK: ICD-10-CM

## 2020-12-30 DIAGNOSIS — I48.0 PAF (PAROXYSMAL ATRIAL FIBRILLATION): ICD-10-CM

## 2020-12-30 DIAGNOSIS — R00.1 SINUS BRADYCARDIA, CHRONIC: ICD-10-CM

## 2020-12-30 DIAGNOSIS — E78.5 HYPERLIPIDEMIA WITH TARGET LDL LESS THAN 130: ICD-10-CM

## 2020-12-30 PROCEDURE — 99214 PR OFFICE/OUTPT VISIT, EST, LEVL IV, 30-39 MIN: ICD-10-PCS | Mod: S$GLB,,, | Performed by: INTERNAL MEDICINE

## 2020-12-30 PROCEDURE — 1159F MED LIST DOCD IN RCRD: CPT | Mod: S$GLB,,, | Performed by: INTERNAL MEDICINE

## 2020-12-30 PROCEDURE — 99999 PR PBB SHADOW E&M-EST. PATIENT-LVL III: ICD-10-PCS | Mod: PBBFAC,,, | Performed by: INTERNAL MEDICINE

## 2020-12-30 PROCEDURE — 1101F PT FALLS ASSESS-DOCD LE1/YR: CPT | Mod: CPTII,S$GLB,, | Performed by: INTERNAL MEDICINE

## 2020-12-30 PROCEDURE — 1126F AMNT PAIN NOTED NONE PRSNT: CPT | Mod: S$GLB,,, | Performed by: INTERNAL MEDICINE

## 2020-12-30 PROCEDURE — 1126F PR PAIN SEVERITY QUANTIFIED, NO PAIN PRESENT: ICD-10-PCS | Mod: S$GLB,,, | Performed by: INTERNAL MEDICINE

## 2020-12-30 PROCEDURE — 1157F ADVNC CARE PLAN IN RCRD: CPT | Mod: S$GLB,,, | Performed by: INTERNAL MEDICINE

## 2020-12-30 PROCEDURE — 1101F PR PT FALLS ASSESS DOC 0-1 FALLS W/OUT INJ PAST YR: ICD-10-PCS | Mod: CPTII,S$GLB,, | Performed by: INTERNAL MEDICINE

## 2020-12-30 PROCEDURE — 99214 OFFICE O/P EST MOD 30 MIN: CPT | Mod: S$GLB,,, | Performed by: INTERNAL MEDICINE

## 2020-12-30 PROCEDURE — 3288F FALL RISK ASSESSMENT DOCD: CPT | Mod: CPTII,S$GLB,, | Performed by: INTERNAL MEDICINE

## 2020-12-30 PROCEDURE — 3080F DIAST BP >= 90 MM HG: CPT | Mod: CPTII,S$GLB,, | Performed by: INTERNAL MEDICINE

## 2020-12-30 PROCEDURE — 1159F PR MEDICATION LIST DOCUMENTED IN MEDICAL RECORD: ICD-10-PCS | Mod: S$GLB,,, | Performed by: INTERNAL MEDICINE

## 2020-12-30 PROCEDURE — 3077F SYST BP >= 140 MM HG: CPT | Mod: CPTII,S$GLB,, | Performed by: INTERNAL MEDICINE

## 2020-12-30 PROCEDURE — 99999 PR PBB SHADOW E&M-EST. PATIENT-LVL III: CPT | Mod: PBBFAC,,, | Performed by: INTERNAL MEDICINE

## 2020-12-30 PROCEDURE — 1157F PR ADVANCE CARE PLAN OR EQUIV PRESENT IN MEDICAL RECORD: ICD-10-PCS | Mod: S$GLB,,, | Performed by: INTERNAL MEDICINE

## 2020-12-30 PROCEDURE — 3288F PR FALLS RISK ASSESSMENT DOCUMENTED: ICD-10-PCS | Mod: CPTII,S$GLB,, | Performed by: INTERNAL MEDICINE

## 2020-12-30 PROCEDURE — 3080F PR MOST RECENT DIASTOLIC BLOOD PRESSURE >= 90 MM HG: ICD-10-PCS | Mod: CPTII,S$GLB,, | Performed by: INTERNAL MEDICINE

## 2020-12-30 PROCEDURE — 3077F PR MOST RECENT SYSTOLIC BLOOD PRESSURE >= 140 MM HG: ICD-10-PCS | Mod: CPTII,S$GLB,, | Performed by: INTERNAL MEDICINE

## 2020-12-30 NOTE — PROGRESS NOTES
Subjective:    Patient ID:  Alexander Patel is a 75 y.o. male who presents for follow-up of paf    HPI  He comes for follow up with no major problems, no chest pain, no shortness of breath.  PPM interrogation last month showed 5 hours of atrial fibrillation, rate controlled, asymptomatic (he has no recollection)    Review of Systems   Constitution: Negative for decreased appetite, malaise/fatigue, weight gain and weight loss.   Cardiovascular: Negative for chest pain, dyspnea on exertion, leg swelling, palpitations and syncope.   Respiratory: Negative for cough and shortness of breath.    Gastrointestinal: Negative.    Neurological: Negative for weakness.   All other systems reviewed and are negative.       Objective:      Physical Exam   Constitutional: He is oriented to person, place, and time. He appears well-developed and well-nourished.   HENT:   Head: Normocephalic.   Eyes: Pupils are equal, round, and reactive to light.   Neck: Normal range of motion. Neck supple. No JVD present. Carotid bruit is not present. No thyromegaly present.   Cardiovascular: Normal rate, regular rhythm, normal heart sounds, intact distal pulses and normal pulses. PMI is not displaced. Exam reveals no gallop.   No murmur heard.  Pulmonary/Chest: Effort normal and breath sounds normal.   Abdominal: Soft. Normal appearance. He exhibits no mass. There is no hepatosplenomegaly. There is no abdominal tenderness.   Musculoskeletal: Normal range of motion.         General: No edema.   Neurological: He is alert and oriented to person, place, and time. He has normal strength and normal reflexes. No sensory deficit.   Skin: Skin is warm and intact.   Psychiatric: He has a normal mood and affect.   Nursing note and vitals reviewed.        Assessment:       1. Essential hypertension    2. Sinus bradycardia, chronic    3. Hyperlipidemia with target LDL less than 130    4. Second degree heart block    5. PAF (paroxysmal atrial fibrillation)          Plan:     Continue all cardiac medications  Regular exercise program  Weight loss  3 m f/u with maynor sandra  Will consider anticoagulation if A fib becomes recurrent or lasting more than 6 hours

## 2021-01-06 ENCOUNTER — PATIENT OUTREACH (OUTPATIENT)
Dept: ADMINISTRATIVE | Facility: HOSPITAL | Age: 76
End: 2021-01-06

## 2021-01-09 ENCOUNTER — IMMUNIZATION (OUTPATIENT)
Dept: FAMILY MEDICINE | Facility: CLINIC | Age: 76
End: 2021-01-09
Payer: MEDICARE

## 2021-01-09 DIAGNOSIS — Z23 NEED FOR VACCINATION: ICD-10-CM

## 2021-01-09 PROCEDURE — 91300 COVID-19, MRNA, LNP-S, PF, 30 MCG/0.3 ML DOSE VACCINE: CPT | Mod: PBBFAC | Performed by: INTERNAL MEDICINE

## 2021-01-21 ENCOUNTER — TELEPHONE (OUTPATIENT)
Dept: ENDOSCOPY | Facility: HOSPITAL | Age: 76
End: 2021-01-21

## 2021-01-26 ENCOUNTER — TELEPHONE (OUTPATIENT)
Dept: ENDOSCOPY | Facility: HOSPITAL | Age: 76
End: 2021-01-26

## 2021-01-26 ENCOUNTER — PATIENT MESSAGE (OUTPATIENT)
Dept: ENDOSCOPY | Facility: HOSPITAL | Age: 76
End: 2021-01-26

## 2021-01-30 ENCOUNTER — IMMUNIZATION (OUTPATIENT)
Dept: FAMILY MEDICINE | Facility: CLINIC | Age: 76
End: 2021-01-30
Payer: MEDICARE

## 2021-01-30 DIAGNOSIS — Z23 NEED FOR VACCINATION: Primary | ICD-10-CM

## 2021-01-30 PROCEDURE — 91300 COVID-19, MRNA, LNP-S, PF, 30 MCG/0.3 ML DOSE VACCINE: CPT | Mod: PBBFAC | Performed by: INTERNAL MEDICINE

## 2021-01-30 PROCEDURE — 0002A COVID-19, MRNA, LNP-S, PF, 30 MCG/0.3 ML DOSE VACCINE: CPT | Mod: PBBFAC | Performed by: INTERNAL MEDICINE

## 2021-02-19 DIAGNOSIS — N40.0 BENIGN PROSTATIC HYPERPLASIA, UNSPECIFIED WHETHER LOWER URINARY TRACT SYMPTOMS PRESENT: ICD-10-CM

## 2021-02-19 RX ORDER — TAMSULOSIN HYDROCHLORIDE 0.4 MG/1
CAPSULE ORAL
Qty: 45 CAPSULE | Refills: 11 | Status: SHIPPED | OUTPATIENT
Start: 2021-02-19 | End: 2022-02-23 | Stop reason: SDUPTHER

## 2021-02-22 ENCOUNTER — CLINICAL SUPPORT (OUTPATIENT)
Dept: FAMILY MEDICINE | Facility: CLINIC | Age: 76
End: 2021-02-22
Payer: MEDICARE

## 2021-02-22 ENCOUNTER — TELEPHONE (OUTPATIENT)
Dept: ENDOSCOPY | Facility: HOSPITAL | Age: 76
End: 2021-02-22

## 2021-02-22 DIAGNOSIS — Z83.719 FAMILY HISTORY OF COLONIC POLYPS: ICD-10-CM

## 2021-02-22 DIAGNOSIS — Z86.010 HISTORY OF COLON POLYPS: ICD-10-CM

## 2021-02-22 DIAGNOSIS — Z12.11 COLON CANCER SCREENING: ICD-10-CM

## 2021-02-22 PROCEDURE — 99999 PR PBB SHADOW E&M-EST. PATIENT-LVL I: ICD-10-PCS | Mod: PBBFAC,,,

## 2021-02-22 PROCEDURE — 99999 PR PBB SHADOW E&M-EST. PATIENT-LVL I: CPT | Mod: PBBFAC,,,

## 2021-02-22 PROCEDURE — U0005 INFEC AGEN DETEC AMPLI PROBE: HCPCS

## 2021-02-22 PROCEDURE — U0003 INFECTIOUS AGENT DETECTION BY NUCLEIC ACID (DNA OR RNA); SEVERE ACUTE RESPIRATORY SYNDROME CORONAVIRUS 2 (SARS-COV-2) (CORONAVIRUS DISEASE [COVID-19]), AMPLIFIED PROBE TECHNIQUE, MAKING USE OF HIGH THROUGHPUT TECHNOLOGIES AS DESCRIBED BY CMS-2020-01-R: HCPCS

## 2021-02-23 LAB — SARS-COV-2 RNA RESP QL NAA+PROBE: NOT DETECTED

## 2021-02-25 ENCOUNTER — HOSPITAL ENCOUNTER (OUTPATIENT)
Facility: HOSPITAL | Age: 76
Discharge: HOME | End: 2021-02-25
Attending: FAMILY MEDICINE | Admitting: FAMILY MEDICINE
Payer: MEDICARE

## 2021-02-25 ENCOUNTER — ANESTHESIA EVENT (OUTPATIENT)
Dept: ENDOSCOPY | Facility: HOSPITAL | Age: 76
End: 2021-02-25
Payer: MEDICARE

## 2021-02-25 ENCOUNTER — ANESTHESIA (OUTPATIENT)
Dept: ENDOSCOPY | Facility: HOSPITAL | Age: 76
End: 2021-02-25
Payer: MEDICARE

## 2021-02-25 VITALS
SYSTOLIC BLOOD PRESSURE: 128 MMHG | RESPIRATION RATE: 16 BRPM | DIASTOLIC BLOOD PRESSURE: 68 MMHG | HEART RATE: 60 BPM | OXYGEN SATURATION: 99 % | TEMPERATURE: 98 F

## 2021-02-25 PROBLEM — Z12.11 COLON CANCER SCREENING: Status: ACTIVE | Noted: 2021-02-25

## 2021-02-25 PROCEDURE — 45385 COLONOSCOPY W/LESION REMOVAL: CPT | Mod: PT,,, | Performed by: FAMILY MEDICINE

## 2021-02-25 PROCEDURE — 45385 COLONOSCOPY W/LESION REMOVAL: CPT | Performed by: FAMILY MEDICINE

## 2021-02-25 PROCEDURE — 27201089 HC SNARE, DISP (ANY): Performed by: FAMILY MEDICINE

## 2021-02-25 PROCEDURE — 88305 TISSUE EXAM BY PATHOLOGIST: CPT | Mod: 59 | Performed by: PATHOLOGY

## 2021-02-25 PROCEDURE — 37000008 HC ANESTHESIA 1ST 15 MINUTES: Performed by: FAMILY MEDICINE

## 2021-02-25 PROCEDURE — 88305 TISSUE EXAM BY PATHOLOGIST: CPT | Mod: 26,,, | Performed by: PATHOLOGY

## 2021-02-25 PROCEDURE — 63600175 PHARM REV CODE 636 W HCPCS: Performed by: NURSE ANESTHETIST, CERTIFIED REGISTERED

## 2021-02-25 PROCEDURE — 88305 TISSUE EXAM BY PATHOLOGIST: ICD-10-PCS | Mod: 26,,, | Performed by: PATHOLOGY

## 2021-02-25 PROCEDURE — 37000009 HC ANESTHESIA EA ADD 15 MINS: Performed by: FAMILY MEDICINE

## 2021-02-25 PROCEDURE — 45385 PR COLONOSCOPY,REMV LESN,SNARE: ICD-10-PCS | Mod: PT,,, | Performed by: FAMILY MEDICINE

## 2021-02-25 PROCEDURE — 27200997: Performed by: FAMILY MEDICINE

## 2021-02-25 RX ORDER — SODIUM CHLORIDE, SODIUM LACTATE, POTASSIUM CHLORIDE, CALCIUM CHLORIDE 600; 310; 30; 20 MG/100ML; MG/100ML; MG/100ML; MG/100ML
INJECTION, SOLUTION INTRAVENOUS CONTINUOUS PRN
Status: DISCONTINUED | OUTPATIENT
Start: 2021-02-25 | End: 2021-02-25

## 2021-02-25 RX ORDER — SODIUM CHLORIDE 0.9 % (FLUSH) 0.9 %
10 SYRINGE (ML) INJECTION
Status: DISCONTINUED | OUTPATIENT
Start: 2021-02-25 | End: 2021-02-25 | Stop reason: HOSPADM

## 2021-02-25 RX ORDER — PROPOFOL 10 MG/ML
VIAL (ML) INTRAVENOUS
Status: DISCONTINUED | OUTPATIENT
Start: 2021-02-25 | End: 2021-02-25

## 2021-02-25 RX ADMIN — PROPOFOL 20 MG: 10 INJECTION, EMULSION INTRAVENOUS at 11:02

## 2021-02-25 RX ADMIN — SODIUM CHLORIDE, SODIUM LACTATE, POTASSIUM CHLORIDE, AND CALCIUM CHLORIDE: 600; 310; 30; 20 INJECTION, SOLUTION INTRAVENOUS at 10:02

## 2021-02-25 RX ADMIN — PROPOFOL 60 MG: 10 INJECTION, EMULSION INTRAVENOUS at 11:02

## 2021-03-02 ENCOUNTER — CLINICAL SUPPORT (OUTPATIENT)
Dept: CARDIOLOGY | Facility: CLINIC | Age: 76
End: 2021-03-02
Payer: MEDICARE

## 2021-03-02 DIAGNOSIS — Z95.0 PRESENCE OF CARDIAC PACEMAKER: ICD-10-CM

## 2021-03-02 LAB
FINAL PATHOLOGIC DIAGNOSIS: NORMAL
GROSS: NORMAL
Lab: NORMAL

## 2021-03-02 PROCEDURE — 93296 CARDIAC DEVICE CHECK - REMOTE: ICD-10-PCS | Mod: ,,, | Performed by: INTERNAL MEDICINE

## 2021-03-02 PROCEDURE — 93296 REM INTERROG EVL PM/IDS: CPT | Mod: ,,, | Performed by: INTERNAL MEDICINE

## 2021-03-02 PROCEDURE — 93294 REM INTERROG EVL PM/LDLS PM: CPT | Mod: ,,, | Performed by: INTERNAL MEDICINE

## 2021-03-02 PROCEDURE — 93294 CARDIAC DEVICE CHECK - REMOTE: ICD-10-PCS | Mod: ,,, | Performed by: INTERNAL MEDICINE

## 2021-04-13 ENCOUNTER — OFFICE VISIT (OUTPATIENT)
Dept: CARDIOLOGY | Facility: CLINIC | Age: 76
End: 2021-04-13
Payer: MEDICARE

## 2021-04-13 VITALS
SYSTOLIC BLOOD PRESSURE: 130 MMHG | HEART RATE: 60 BPM | WEIGHT: 163.38 LBS | HEIGHT: 66 IN | BODY MASS INDEX: 26.26 KG/M2 | DIASTOLIC BLOOD PRESSURE: 81 MMHG

## 2021-04-13 DIAGNOSIS — E78.5 HYPERLIPIDEMIA WITH TARGET LDL LESS THAN 130: ICD-10-CM

## 2021-04-13 DIAGNOSIS — I48.0 PAF (PAROXYSMAL ATRIAL FIBRILLATION): Primary | ICD-10-CM

## 2021-04-13 DIAGNOSIS — Z95.0 CARDIAC PACEMAKER IN SITU: ICD-10-CM

## 2021-04-13 DIAGNOSIS — I70.0 ATHEROSCLEROSIS OF AORTA: ICD-10-CM

## 2021-04-13 DIAGNOSIS — I44.1 SECOND DEGREE HEART BLOCK: ICD-10-CM

## 2021-04-13 DIAGNOSIS — N18.30 STAGE 3 CHRONIC KIDNEY DISEASE, UNSPECIFIED WHETHER STAGE 3A OR 3B CKD: ICD-10-CM

## 2021-04-13 DIAGNOSIS — I10 ESSENTIAL HYPERTENSION: ICD-10-CM

## 2021-04-13 DIAGNOSIS — R00.1 SINUS BRADYCARDIA, CHRONIC: ICD-10-CM

## 2021-04-13 PROCEDURE — 99214 OFFICE O/P EST MOD 30 MIN: CPT | Mod: S$GLB,,, | Performed by: PHYSICIAN ASSISTANT

## 2021-04-13 PROCEDURE — 1101F PR PT FALLS ASSESS DOC 0-1 FALLS W/OUT INJ PAST YR: ICD-10-PCS | Mod: CPTII,S$GLB,, | Performed by: PHYSICIAN ASSISTANT

## 2021-04-13 PROCEDURE — 1159F MED LIST DOCD IN RCRD: CPT | Mod: S$GLB,,, | Performed by: PHYSICIAN ASSISTANT

## 2021-04-13 PROCEDURE — 3288F FALL RISK ASSESSMENT DOCD: CPT | Mod: CPTII,S$GLB,, | Performed by: PHYSICIAN ASSISTANT

## 2021-04-13 PROCEDURE — 1157F PR ADVANCE CARE PLAN OR EQUIV PRESENT IN MEDICAL RECORD: ICD-10-PCS | Mod: S$GLB,,, | Performed by: PHYSICIAN ASSISTANT

## 2021-04-13 PROCEDURE — 1101F PT FALLS ASSESS-DOCD LE1/YR: CPT | Mod: CPTII,S$GLB,, | Performed by: PHYSICIAN ASSISTANT

## 2021-04-13 PROCEDURE — 1159F PR MEDICATION LIST DOCUMENTED IN MEDICAL RECORD: ICD-10-PCS | Mod: S$GLB,,, | Performed by: PHYSICIAN ASSISTANT

## 2021-04-13 PROCEDURE — 99499 RISK ADDL DX/OHS AUDIT: ICD-10-PCS | Mod: S$GLB,,, | Performed by: PHYSICIAN ASSISTANT

## 2021-04-13 PROCEDURE — 99499 UNLISTED E&M SERVICE: CPT | Mod: S$GLB,,, | Performed by: PHYSICIAN ASSISTANT

## 2021-04-13 PROCEDURE — 1157F ADVNC CARE PLAN IN RCRD: CPT | Mod: S$GLB,,, | Performed by: PHYSICIAN ASSISTANT

## 2021-04-13 PROCEDURE — 3288F PR FALLS RISK ASSESSMENT DOCUMENTED: ICD-10-PCS | Mod: CPTII,S$GLB,, | Performed by: PHYSICIAN ASSISTANT

## 2021-04-13 PROCEDURE — 99999 PR PBB SHADOW E&M-EST. PATIENT-LVL III: CPT | Mod: PBBFAC,,, | Performed by: PHYSICIAN ASSISTANT

## 2021-04-13 PROCEDURE — 99999 PR PBB SHADOW E&M-EST. PATIENT-LVL III: ICD-10-PCS | Mod: PBBFAC,,, | Performed by: PHYSICIAN ASSISTANT

## 2021-04-13 PROCEDURE — 99214 PR OFFICE/OUTPT VISIT, EST, LEVL IV, 30-39 MIN: ICD-10-PCS | Mod: S$GLB,,, | Performed by: PHYSICIAN ASSISTANT

## 2021-05-31 ENCOUNTER — HOSPITAL ENCOUNTER (OUTPATIENT)
Dept: CARDIOLOGY | Facility: HOSPITAL | Age: 76
Discharge: HOME | End: 2021-05-31
Payer: MEDICARE

## 2021-05-31 ENCOUNTER — CLINICAL SUPPORT (OUTPATIENT)
Dept: CARDIOLOGY | Facility: HOSPITAL | Age: 76
End: 2021-05-31
Payer: MEDICARE

## 2021-05-31 DIAGNOSIS — Z95.0 PRESENCE OF CARDIAC PACEMAKER: ICD-10-CM

## 2021-05-31 PROCEDURE — 93294 REM INTERROG EVL PM/LDLS PM: CPT | Mod: ,,, | Performed by: INTERNAL MEDICINE

## 2021-05-31 PROCEDURE — 93294 CARDIAC DEVICE CHECK - REMOTE: ICD-10-PCS | Mod: ,,, | Performed by: INTERNAL MEDICINE

## 2021-05-31 PROCEDURE — 93296 REM INTERROG EVL PM/IDS: CPT | Mod: PO | Performed by: INTERNAL MEDICINE

## 2021-08-05 ENCOUNTER — TELEPHONE (OUTPATIENT)
Dept: FAMILY MEDICINE | Facility: CLINIC | Age: 76
End: 2021-08-05

## 2021-08-05 DIAGNOSIS — Z20.822 EXPOSURE TO COVID-19 VIRUS: Primary | ICD-10-CM

## 2021-08-29 ENCOUNTER — CLINICAL SUPPORT (OUTPATIENT)
Dept: CARDIOLOGY | Facility: HOSPITAL | Age: 76
End: 2021-08-29
Payer: MEDICARE

## 2021-08-29 DIAGNOSIS — Z95.0 PRESENCE OF CARDIAC PACEMAKER: ICD-10-CM

## 2021-08-29 PROCEDURE — 93294 REM INTERROG EVL PM/LDLS PM: CPT | Mod: ,,, | Performed by: INTERNAL MEDICINE

## 2021-08-29 PROCEDURE — 93296 REM INTERROG EVL PM/IDS: CPT | Mod: PO | Performed by: INTERNAL MEDICINE

## 2021-08-29 PROCEDURE — 93294 CARDIAC DEVICE CHECK - REMOTE: ICD-10-PCS | Mod: ,,, | Performed by: INTERNAL MEDICINE

## 2021-09-15 ENCOUNTER — TELEPHONE (OUTPATIENT)
Dept: FAMILY MEDICINE | Facility: CLINIC | Age: 76
End: 2021-09-15

## 2021-09-15 ENCOUNTER — HOSPITAL ENCOUNTER (OUTPATIENT)
Dept: RADIOLOGY | Facility: HOSPITAL | Age: 76
Discharge: HOME | End: 2021-09-15
Attending: FAMILY MEDICINE
Payer: MEDICARE

## 2021-09-15 ENCOUNTER — OFFICE VISIT (OUTPATIENT)
Dept: FAMILY MEDICINE | Facility: CLINIC | Age: 76
End: 2021-09-15
Payer: MEDICARE

## 2021-09-15 VITALS
SYSTOLIC BLOOD PRESSURE: 131 MMHG | WEIGHT: 163 LBS | DIASTOLIC BLOOD PRESSURE: 79 MMHG | TEMPERATURE: 98 F | BODY MASS INDEX: 27.16 KG/M2 | HEART RATE: 61 BPM | HEIGHT: 65 IN

## 2021-09-15 DIAGNOSIS — R40.4 TRANSIENT ALTERATION OF AWARENESS: ICD-10-CM

## 2021-09-15 DIAGNOSIS — R53.83 FATIGUE, UNSPECIFIED TYPE: ICD-10-CM

## 2021-09-15 DIAGNOSIS — M54.2 NECK PAIN: Primary | ICD-10-CM

## 2021-09-15 DIAGNOSIS — R41.82 ALTERED MENTAL STATUS, UNSPECIFIED ALTERED MENTAL STATUS TYPE: ICD-10-CM

## 2021-09-15 DIAGNOSIS — I95.9 HYPOTENSION, UNSPECIFIED HYPOTENSION TYPE: ICD-10-CM

## 2021-09-15 DIAGNOSIS — M54.2 NECK PAIN: ICD-10-CM

## 2021-09-15 DIAGNOSIS — R47.9 SPEECH DISTURBANCE, UNSPECIFIED TYPE: ICD-10-CM

## 2021-09-15 PROCEDURE — 72050 X-RAY EXAM NECK SPINE 4/5VWS: CPT | Mod: 26,,, | Performed by: RADIOLOGY

## 2021-09-15 PROCEDURE — 3288F FALL RISK ASSESSMENT DOCD: CPT | Mod: CPTII,S$GLB,, | Performed by: FAMILY MEDICINE

## 2021-09-15 PROCEDURE — 99214 OFFICE O/P EST MOD 30 MIN: CPT | Mod: S$GLB,,, | Performed by: FAMILY MEDICINE

## 2021-09-15 PROCEDURE — 1101F PR PT FALLS ASSESS DOC 0-1 FALLS W/OUT INJ PAST YR: ICD-10-PCS | Mod: CPTII,S$GLB,, | Performed by: FAMILY MEDICINE

## 2021-09-15 PROCEDURE — 72050 X-RAY EXAM NECK SPINE 4/5VWS: CPT | Mod: TC,PO

## 2021-09-15 PROCEDURE — 3075F SYST BP GE 130 - 139MM HG: CPT | Mod: CPTII,S$GLB,, | Performed by: FAMILY MEDICINE

## 2021-09-15 PROCEDURE — 3078F PR MOST RECENT DIASTOLIC BLOOD PRESSURE < 80 MM HG: ICD-10-PCS | Mod: CPTII,S$GLB,, | Performed by: FAMILY MEDICINE

## 2021-09-15 PROCEDURE — 1157F PR ADVANCE CARE PLAN OR EQUIV PRESENT IN MEDICAL RECORD: ICD-10-PCS | Mod: CPTII,S$GLB,, | Performed by: FAMILY MEDICINE

## 2021-09-15 PROCEDURE — 99999 PR PBB SHADOW E&M-EST. PATIENT-LVL IV: CPT | Mod: PBBFAC,,, | Performed by: FAMILY MEDICINE

## 2021-09-15 PROCEDURE — 3078F DIAST BP <80 MM HG: CPT | Mod: CPTII,S$GLB,, | Performed by: FAMILY MEDICINE

## 2021-09-15 PROCEDURE — 1101F PT FALLS ASSESS-DOCD LE1/YR: CPT | Mod: CPTII,S$GLB,, | Performed by: FAMILY MEDICINE

## 2021-09-15 PROCEDURE — 3075F PR MOST RECENT SYSTOLIC BLOOD PRESS GE 130-139MM HG: ICD-10-PCS | Mod: CPTII,S$GLB,, | Performed by: FAMILY MEDICINE

## 2021-09-15 PROCEDURE — 3288F PR FALLS RISK ASSESSMENT DOCUMENTED: ICD-10-PCS | Mod: CPTII,S$GLB,, | Performed by: FAMILY MEDICINE

## 2021-09-15 PROCEDURE — 1125F PR PAIN SEVERITY QUANTIFIED, PAIN PRESENT: ICD-10-PCS | Mod: CPTII,S$GLB,, | Performed by: FAMILY MEDICINE

## 2021-09-15 PROCEDURE — 99999 PR PBB SHADOW E&M-EST. PATIENT-LVL IV: ICD-10-PCS | Mod: PBBFAC,,, | Performed by: FAMILY MEDICINE

## 2021-09-15 PROCEDURE — 1159F PR MEDICATION LIST DOCUMENTED IN MEDICAL RECORD: ICD-10-PCS | Mod: CPTII,S$GLB,, | Performed by: FAMILY MEDICINE

## 2021-09-15 PROCEDURE — 1157F ADVNC CARE PLAN IN RCRD: CPT | Mod: CPTII,S$GLB,, | Performed by: FAMILY MEDICINE

## 2021-09-15 PROCEDURE — 99214 PR OFFICE/OUTPT VISIT, EST, LEVL IV, 30-39 MIN: ICD-10-PCS | Mod: S$GLB,,, | Performed by: FAMILY MEDICINE

## 2021-09-15 PROCEDURE — 1159F MED LIST DOCD IN RCRD: CPT | Mod: CPTII,S$GLB,, | Performed by: FAMILY MEDICINE

## 2021-09-15 PROCEDURE — 72050 XR CERVICAL SPINE COMPLETE 5 VIEW: ICD-10-PCS | Mod: 26,,, | Performed by: RADIOLOGY

## 2021-09-15 PROCEDURE — 1125F AMNT PAIN NOTED PAIN PRSNT: CPT | Mod: CPTII,S$GLB,, | Performed by: FAMILY MEDICINE

## 2021-09-20 ENCOUNTER — HOSPITAL ENCOUNTER (OUTPATIENT)
Dept: RADIOLOGY | Facility: HOSPITAL | Age: 76
Discharge: HOME | End: 2021-09-20
Attending: FAMILY MEDICINE
Payer: MEDICARE

## 2021-09-20 DIAGNOSIS — R41.82 ALTERED MENTAL STATUS, UNSPECIFIED ALTERED MENTAL STATUS TYPE: ICD-10-CM

## 2021-09-20 PROCEDURE — 70450 CT HEAD/BRAIN W/O DYE: CPT | Mod: 26,,, | Performed by: RADIOLOGY

## 2021-09-20 PROCEDURE — 70450 CT HEAD/BRAIN W/O DYE: CPT | Mod: TC,PO

## 2021-09-20 PROCEDURE — 70450 CT HEAD WITHOUT CONTRAST: ICD-10-PCS | Mod: 26,,, | Performed by: RADIOLOGY

## 2021-09-22 ENCOUNTER — TELEPHONE (OUTPATIENT)
Dept: FAMILY MEDICINE | Facility: CLINIC | Age: 76
End: 2021-09-22

## 2021-09-22 PROBLEM — I63.9 STROKE: Status: ACTIVE | Noted: 2021-09-22

## 2021-09-29 DIAGNOSIS — I48.0 PAF (PAROXYSMAL ATRIAL FIBRILLATION): ICD-10-CM

## 2021-09-29 DIAGNOSIS — Z95.0 PRESENCE OF CARDIAC PACEMAKER: Primary | ICD-10-CM

## 2021-10-05 ENCOUNTER — IMMUNIZATION (OUTPATIENT)
Dept: FAMILY MEDICINE | Facility: CLINIC | Age: 76
End: 2021-10-05
Payer: MEDICARE

## 2021-10-05 DIAGNOSIS — Z23 NEED FOR VACCINATION: Primary | ICD-10-CM

## 2021-10-05 PROCEDURE — 91300 COVID-19, MRNA, LNP-S, PF, 30 MCG/0.3 ML DOSE VACCINE: CPT | Mod: PBBFAC | Performed by: FAMILY MEDICINE

## 2021-10-05 PROCEDURE — 0003A COVID-19, MRNA, LNP-S, PF, 30 MCG/0.3 ML DOSE VACCINE: CPT | Mod: PBBFAC | Performed by: FAMILY MEDICINE

## 2021-10-08 ENCOUNTER — PATIENT MESSAGE (OUTPATIENT)
Dept: FAMILY MEDICINE | Facility: CLINIC | Age: 76
End: 2021-10-08

## 2021-10-21 ENCOUNTER — CLINICAL SUPPORT (OUTPATIENT)
Dept: CARDIOLOGY | Facility: HOSPITAL | Age: 76
End: 2021-10-21
Attending: INTERNAL MEDICINE
Payer: MEDICARE

## 2021-10-21 VITALS — HEIGHT: 65 IN | HEART RATE: 60 BPM | BODY MASS INDEX: 27.15 KG/M2 | WEIGHT: 162.94 LBS

## 2021-10-21 DIAGNOSIS — I48.0 PAF (PAROXYSMAL ATRIAL FIBRILLATION): ICD-10-CM

## 2021-10-21 DIAGNOSIS — Z95.0 PRESENCE OF CARDIAC PACEMAKER: ICD-10-CM

## 2021-10-21 LAB
ASCENDING AORTA: 3.26 CM
AV INDEX (PROSTH): 0.69
AV MEAN GRADIENT: 4 MMHG
AV PEAK GRADIENT: 8 MMHG
AV VALVE AREA: 2.5 CM2
AV VELOCITY RATIO: 0.67
BSA FOR ECHO PROCEDURE: 1.84 M2
CV ECHO LV RWT: 0.53 CM
DOP CALC AO PEAK VEL: 1.38 M/S
DOP CALC AO VTI: 30.24 CM
DOP CALC LVOT AREA: 3.6 CM2
DOP CALC LVOT DIAMETER: 2.14 CM
DOP CALC LVOT PEAK VEL: 0.92 M/S
DOP CALC LVOT STROKE VOLUME: 75.46 CM3
DOP CALCLVOT PEAK VEL VTI: 20.99 CM
E WAVE DECELERATION TIME: 250.5 MSEC
E/A RATIO: 0.61
E/E' RATIO: 8.36 M/S
ECHO LV POSTERIOR WALL: 1.15 CM (ref 0.6–1.1)
EJECTION FRACTION: 60 %
FRACTIONAL SHORTENING: 31 % (ref 28–44)
INTERVENTRICULAR SEPTUM: 1.18 CM (ref 0.6–1.1)
IVRT: 168.41 MSEC
LA MAJOR: 4.35 CM
LA MINOR: 4.76 CM
LA WIDTH: 2.75 CM
LEFT ATRIUM SIZE: 4.44 CM
LEFT ATRIUM VOLUME INDEX: 26.1 ML/M2
LEFT ATRIUM VOLUME: 47.18 CM3
LEFT INTERNAL DIMENSION IN SYSTOLE: 3.01 CM (ref 2.1–4)
LEFT VENTRICLE DIASTOLIC VOLUME INDEX: 47.72 ML/M2
LEFT VENTRICLE DIASTOLIC VOLUME: 86.38 ML
LEFT VENTRICLE MASS INDEX: 100 G/M2
LEFT VENTRICLE SYSTOLIC VOLUME INDEX: 19.4 ML/M2
LEFT VENTRICLE SYSTOLIC VOLUME: 35.2 ML
LEFT VENTRICULAR INTERNAL DIMENSION IN DIASTOLE: 4.37 CM (ref 3.5–6)
LEFT VENTRICULAR MASS: 181.4 G
LV LATERAL E/E' RATIO: 6.57 M/S
LV SEPTAL E/E' RATIO: 11.5 M/S
MV A" WAVE DURATION": 10.28 MSEC
MV PEAK A VEL: 0.75 M/S
MV PEAK E VEL: 0.46 M/S
MV STENOSIS PRESSURE HALF TIME: 72.64 MS
MV VALVE AREA P 1/2 METHOD: 3.03 CM2
PISA TR MAX VEL: 2.37 M/S
PULM VEIN S/D RATIO: 1.45
PV PEAK D VEL: 0.31 M/S
PV PEAK S VEL: 0.45 M/S
RA MAJOR: 4.24 CM
RA PRESSURE: 3 MMHG
RA WIDTH: 2.6 CM
RIGHT VENTRICULAR END-DIASTOLIC DIMENSION: 3.17 CM
RV TISSUE DOPPLER FREE WALL SYSTOLIC VELOCITY 1 (APICAL 4 CHAMBER VIEW): 10.98 CM/S
SINUS: 3.62 CM
STJ: 3.09 CM
TDI LATERAL: 0.07 M/S
TDI SEPTAL: 0.04 M/S
TDI: 0.06 M/S
TR MAX PG: 22 MMHG
TRICUSPID ANNULAR PLANE SYSTOLIC EXCURSION: 2.17 CM
TV REST PULMONARY ARTERY PRESSURE: 25 MMHG

## 2021-10-21 PROCEDURE — 93280 CARDIAC DEVICE CHECK - IN CLINIC & HOSPITAL: ICD-10-PCS | Mod: 26,,, | Performed by: INTERNAL MEDICINE

## 2021-10-21 PROCEDURE — 93280 PM DEVICE PROGR EVAL DUAL: CPT | Mod: 26,,, | Performed by: INTERNAL MEDICINE

## 2021-10-21 PROCEDURE — 93306 TTE W/DOPPLER COMPLETE: CPT | Mod: PO

## 2021-10-25 ENCOUNTER — PATIENT OUTREACH (OUTPATIENT)
Dept: ADMINISTRATIVE | Facility: OTHER | Age: 76
End: 2021-10-25
Payer: MEDICARE

## 2021-10-28 ENCOUNTER — OFFICE VISIT (OUTPATIENT)
Dept: CARDIOLOGY | Facility: CLINIC | Age: 76
End: 2021-10-28
Payer: MEDICARE

## 2021-10-28 VITALS
DIASTOLIC BLOOD PRESSURE: 82 MMHG | HEART RATE: 60 BPM | HEIGHT: 65 IN | SYSTOLIC BLOOD PRESSURE: 148 MMHG | WEIGHT: 164.25 LBS | BODY MASS INDEX: 27.36 KG/M2

## 2021-10-28 DIAGNOSIS — Z95.0 CARDIAC PACEMAKER IN SITU: ICD-10-CM

## 2021-10-28 DIAGNOSIS — I48.0 PAF (PAROXYSMAL ATRIAL FIBRILLATION): Primary | ICD-10-CM

## 2021-10-28 DIAGNOSIS — I10 ESSENTIAL HYPERTENSION: ICD-10-CM

## 2021-10-28 PROCEDURE — 99214 PR OFFICE/OUTPT VISIT, EST, LEVL IV, 30-39 MIN: ICD-10-PCS | Mod: S$GLB,,, | Performed by: INTERNAL MEDICINE

## 2021-10-28 PROCEDURE — 1157F ADVNC CARE PLAN IN RCRD: CPT | Mod: CPTII,S$GLB,, | Performed by: INTERNAL MEDICINE

## 2021-10-28 PROCEDURE — 99999 PR PBB SHADOW E&M-EST. PATIENT-LVL III: CPT | Mod: PBBFAC,,, | Performed by: INTERNAL MEDICINE

## 2021-10-28 PROCEDURE — 1159F MED LIST DOCD IN RCRD: CPT | Mod: CPTII,S$GLB,, | Performed by: INTERNAL MEDICINE

## 2021-10-28 PROCEDURE — 1159F PR MEDICATION LIST DOCUMENTED IN MEDICAL RECORD: ICD-10-PCS | Mod: CPTII,S$GLB,, | Performed by: INTERNAL MEDICINE

## 2021-10-28 PROCEDURE — 1160F PR REVIEW ALL MEDS BY PRESCRIBER/CLIN PHARMACIST DOCUMENTED: ICD-10-PCS | Mod: CPTII,S$GLB,, | Performed by: INTERNAL MEDICINE

## 2021-10-28 PROCEDURE — 3077F SYST BP >= 140 MM HG: CPT | Mod: CPTII,S$GLB,, | Performed by: INTERNAL MEDICINE

## 2021-10-28 PROCEDURE — 3077F PR MOST RECENT SYSTOLIC BLOOD PRESSURE >= 140 MM HG: ICD-10-PCS | Mod: CPTII,S$GLB,, | Performed by: INTERNAL MEDICINE

## 2021-10-28 PROCEDURE — 1126F PR PAIN SEVERITY QUANTIFIED, NO PAIN PRESENT: ICD-10-PCS | Mod: CPTII,S$GLB,, | Performed by: INTERNAL MEDICINE

## 2021-10-28 PROCEDURE — 3079F DIAST BP 80-89 MM HG: CPT | Mod: CPTII,S$GLB,, | Performed by: INTERNAL MEDICINE

## 2021-10-28 PROCEDURE — 1157F PR ADVANCE CARE PLAN OR EQUIV PRESENT IN MEDICAL RECORD: ICD-10-PCS | Mod: CPTII,S$GLB,, | Performed by: INTERNAL MEDICINE

## 2021-10-28 PROCEDURE — 1126F AMNT PAIN NOTED NONE PRSNT: CPT | Mod: CPTII,S$GLB,, | Performed by: INTERNAL MEDICINE

## 2021-10-28 PROCEDURE — 1160F RVW MEDS BY RX/DR IN RCRD: CPT | Mod: CPTII,S$GLB,, | Performed by: INTERNAL MEDICINE

## 2021-10-28 PROCEDURE — 99999 PR PBB SHADOW E&M-EST. PATIENT-LVL III: ICD-10-PCS | Mod: PBBFAC,,, | Performed by: INTERNAL MEDICINE

## 2021-10-28 PROCEDURE — 3079F PR MOST RECENT DIASTOLIC BLOOD PRESSURE 80-89 MM HG: ICD-10-PCS | Mod: CPTII,S$GLB,, | Performed by: INTERNAL MEDICINE

## 2021-10-28 PROCEDURE — 99214 OFFICE O/P EST MOD 30 MIN: CPT | Mod: S$GLB,,, | Performed by: INTERNAL MEDICINE

## 2021-11-22 ENCOUNTER — PATIENT MESSAGE (OUTPATIENT)
Dept: FAMILY MEDICINE | Facility: CLINIC | Age: 76
End: 2021-11-22
Payer: MEDICARE

## 2021-11-27 ENCOUNTER — CLINICAL SUPPORT (OUTPATIENT)
Dept: CARDIOLOGY | Facility: HOSPITAL | Age: 76
End: 2021-11-27
Payer: MEDICARE

## 2021-11-27 DIAGNOSIS — Z95.0 PRESENCE OF CARDIAC PACEMAKER: ICD-10-CM

## 2021-11-27 PROCEDURE — 93294 REM INTERROG EVL PM/LDLS PM: CPT | Mod: ,,, | Performed by: INTERNAL MEDICINE

## 2021-11-27 PROCEDURE — 93296 REM INTERROG EVL PM/IDS: CPT | Mod: PO | Performed by: INTERNAL MEDICINE

## 2021-11-27 PROCEDURE — 93294 CARDIAC DEVICE CHECK - REMOTE: ICD-10-PCS | Mod: ,,, | Performed by: INTERNAL MEDICINE

## 2022-01-03 NOTE — TELEPHONE ENCOUNTER
Pt's kidney function has overall improved, ZEINA resolved. If pt needs further Nephrology follow up, per PCP

## 2022-01-21 ENCOUNTER — TELEPHONE (OUTPATIENT)
Dept: FAMILY MEDICINE | Facility: CLINIC | Age: 77
End: 2022-01-21
Payer: MEDICARE

## 2022-01-21 ENCOUNTER — PATIENT MESSAGE (OUTPATIENT)
Dept: ADMINISTRATIVE | Facility: HOSPITAL | Age: 77
End: 2022-01-21
Payer: MEDICARE

## 2022-01-21 VITALS — SYSTOLIC BLOOD PRESSURE: 122 MMHG | DIASTOLIC BLOOD PRESSURE: 63 MMHG

## 2022-01-23 DIAGNOSIS — M10.072 ACUTE IDIOPATHIC GOUT OF LEFT FOOT: ICD-10-CM

## 2022-01-23 DIAGNOSIS — E79.0 HYPERURICEMIA: ICD-10-CM

## 2022-01-23 NOTE — TELEPHONE ENCOUNTER
No new care gaps identified.  Powered by "[employer] by [employer]. Reference number: [id number].   1/23/2022 6:55:11 AM CST

## 2022-01-24 RX ORDER — ALLOPURINOL 100 MG/1
TABLET ORAL
Qty: 90 TABLET | Refills: 3 | Status: SHIPPED | OUTPATIENT
Start: 2022-01-24 | End: 2022-02-23 | Stop reason: SDUPTHER

## 2022-01-24 NOTE — TELEPHONE ENCOUNTER
I have signed for the following orders AND/OR meds.  Please call the patient and ask the patient to schedule the testing AND/OR inform about any medications that were sent.      Orders Placed This Encounter   Procedures    Lipid Panel     Standing Status:   Future     Standing Expiration Date:   1/24/2023    Uric Acid     Standing Status:   Future     Standing Expiration Date:   1/24/2023       Medications Ordered This Encounter   Medications    allopurinoL (ZYLOPRIM) 100 MG tablet     Sig: TAKE 1 TABLET BY MOUTH EVERY DAY     Dispense:  90 tablet     Refill:  3

## 2022-02-21 ENCOUNTER — TELEPHONE (OUTPATIENT)
Dept: CARDIOLOGY | Facility: HOSPITAL | Age: 77
End: 2022-02-21
Payer: MEDICARE

## 2022-02-21 ENCOUNTER — PES CALL (OUTPATIENT)
Dept: ADMINISTRATIVE | Facility: CLINIC | Age: 77
End: 2022-02-21
Payer: MEDICARE

## 2022-02-21 NOTE — TELEPHONE ENCOUNTER
Spoke with wife.. informed her pt does not need a device check now, he had one October of 2021. Scheduled appt for Sept 30th before appt w/Yady Wynne.  She accepted date and time

## 2022-02-23 ENCOUNTER — HOSPITAL ENCOUNTER (OUTPATIENT)
Dept: RADIOLOGY | Facility: HOSPITAL | Age: 77
Discharge: HOME | End: 2022-02-23
Attending: FAMILY MEDICINE
Payer: MEDICARE

## 2022-02-23 ENCOUNTER — OFFICE VISIT (OUTPATIENT)
Dept: FAMILY MEDICINE | Facility: CLINIC | Age: 77
End: 2022-02-23
Payer: MEDICARE

## 2022-02-23 VITALS
HEIGHT: 65 IN | BODY MASS INDEX: 27.82 KG/M2 | HEART RATE: 60 BPM | WEIGHT: 167 LBS | SYSTOLIC BLOOD PRESSURE: 132 MMHG | DIASTOLIC BLOOD PRESSURE: 80 MMHG | TEMPERATURE: 98 F

## 2022-02-23 DIAGNOSIS — I70.0 ATHEROSCLEROSIS OF AORTA: ICD-10-CM

## 2022-02-23 DIAGNOSIS — N18.30 STAGE 3 CHRONIC KIDNEY DISEASE, UNSPECIFIED WHETHER STAGE 3A OR 3B CKD: ICD-10-CM

## 2022-02-23 DIAGNOSIS — Z13.1 SCREENING FOR DIABETES MELLITUS: ICD-10-CM

## 2022-02-23 DIAGNOSIS — M54.2 NECK PAIN: Primary | ICD-10-CM

## 2022-02-23 DIAGNOSIS — M10.09 ACUTE IDIOPATHIC GOUT OF MULTIPLE SITES: ICD-10-CM

## 2022-02-23 DIAGNOSIS — Z13.6 ENCOUNTER FOR LIPID SCREENING FOR CARDIOVASCULAR DISEASE: ICD-10-CM

## 2022-02-23 DIAGNOSIS — M40.204 KYPHOSIS OF THORACIC REGION, UNSPECIFIED KYPHOSIS TYPE: ICD-10-CM

## 2022-02-23 DIAGNOSIS — M10.072 ACUTE IDIOPATHIC GOUT OF LEFT FOOT: ICD-10-CM

## 2022-02-23 DIAGNOSIS — M54.2 NECK PAIN: ICD-10-CM

## 2022-02-23 DIAGNOSIS — I48.0 PAF (PAROXYSMAL ATRIAL FIBRILLATION): ICD-10-CM

## 2022-02-23 DIAGNOSIS — E79.0 HYPERURICEMIA: ICD-10-CM

## 2022-02-23 DIAGNOSIS — Z12.5 ENCOUNTER FOR PROSTATE CANCER SCREENING: ICD-10-CM

## 2022-02-23 DIAGNOSIS — N40.0 BENIGN PROSTATIC HYPERPLASIA, UNSPECIFIED WHETHER LOWER URINARY TRACT SYMPTOMS PRESENT: ICD-10-CM

## 2022-02-23 DIAGNOSIS — Z13.220 ENCOUNTER FOR LIPID SCREENING FOR CARDIOVASCULAR DISEASE: ICD-10-CM

## 2022-02-23 PROCEDURE — 1126F AMNT PAIN NOTED NONE PRSNT: CPT | Mod: CPTII,S$GLB,, | Performed by: FAMILY MEDICINE

## 2022-02-23 PROCEDURE — 99214 PR OFFICE/OUTPT VISIT, EST, LEVL IV, 30-39 MIN: ICD-10-PCS | Mod: S$GLB,,, | Performed by: FAMILY MEDICINE

## 2022-02-23 PROCEDURE — 1157F ADVNC CARE PLAN IN RCRD: CPT | Mod: CPTII,S$GLB,, | Performed by: FAMILY MEDICINE

## 2022-02-23 PROCEDURE — 1157F PR ADVANCE CARE PLAN OR EQUIV PRESENT IN MEDICAL RECORD: ICD-10-PCS | Mod: CPTII,S$GLB,, | Performed by: FAMILY MEDICINE

## 2022-02-23 PROCEDURE — 3288F FALL RISK ASSESSMENT DOCD: CPT | Mod: CPTII,S$GLB,, | Performed by: FAMILY MEDICINE

## 2022-02-23 PROCEDURE — 99999 PR PBB SHADOW E&M-EST. PATIENT-LVL III: CPT | Mod: PBBFAC,,, | Performed by: FAMILY MEDICINE

## 2022-02-23 PROCEDURE — 1101F PT FALLS ASSESS-DOCD LE1/YR: CPT | Mod: CPTII,S$GLB,, | Performed by: FAMILY MEDICINE

## 2022-02-23 PROCEDURE — 72050 X-RAY EXAM NECK SPINE 4/5VWS: CPT | Mod: TC,PO

## 2022-02-23 PROCEDURE — 1159F MED LIST DOCD IN RCRD: CPT | Mod: CPTII,S$GLB,, | Performed by: FAMILY MEDICINE

## 2022-02-23 PROCEDURE — 72070 X-RAY EXAM THORAC SPINE 2VWS: CPT | Mod: TC,PO

## 2022-02-23 PROCEDURE — 1159F PR MEDICATION LIST DOCUMENTED IN MEDICAL RECORD: ICD-10-PCS | Mod: CPTII,S$GLB,, | Performed by: FAMILY MEDICINE

## 2022-02-23 PROCEDURE — 72050 X-RAY EXAM NECK SPINE 4/5VWS: CPT | Mod: 26,,, | Performed by: RADIOLOGY

## 2022-02-23 PROCEDURE — 3079F DIAST BP 80-89 MM HG: CPT | Mod: CPTII,S$GLB,, | Performed by: FAMILY MEDICINE

## 2022-02-23 PROCEDURE — 1101F PR PT FALLS ASSESS DOC 0-1 FALLS W/OUT INJ PAST YR: ICD-10-PCS | Mod: CPTII,S$GLB,, | Performed by: FAMILY MEDICINE

## 2022-02-23 PROCEDURE — 3075F SYST BP GE 130 - 139MM HG: CPT | Mod: CPTII,S$GLB,, | Performed by: FAMILY MEDICINE

## 2022-02-23 PROCEDURE — 3075F PR MOST RECENT SYSTOLIC BLOOD PRESS GE 130-139MM HG: ICD-10-PCS | Mod: CPTII,S$GLB,, | Performed by: FAMILY MEDICINE

## 2022-02-23 PROCEDURE — 72070 X-RAY EXAM THORAC SPINE 2VWS: CPT | Mod: 26,,, | Performed by: RADIOLOGY

## 2022-02-23 PROCEDURE — 99214 OFFICE O/P EST MOD 30 MIN: CPT | Mod: S$GLB,,, | Performed by: FAMILY MEDICINE

## 2022-02-23 PROCEDURE — 72050 XR CERVICAL SPINE COMPLETE 5 VIEW: ICD-10-PCS | Mod: 26,,, | Performed by: RADIOLOGY

## 2022-02-23 PROCEDURE — 99999 PR PBB SHADOW E&M-EST. PATIENT-LVL III: ICD-10-PCS | Mod: PBBFAC,,, | Performed by: FAMILY MEDICINE

## 2022-02-23 PROCEDURE — 1126F PR PAIN SEVERITY QUANTIFIED, NO PAIN PRESENT: ICD-10-PCS | Mod: CPTII,S$GLB,, | Performed by: FAMILY MEDICINE

## 2022-02-23 PROCEDURE — 3079F PR MOST RECENT DIASTOLIC BLOOD PRESSURE 80-89 MM HG: ICD-10-PCS | Mod: CPTII,S$GLB,, | Performed by: FAMILY MEDICINE

## 2022-02-23 PROCEDURE — 72070 XR THORACIC SPINE AP LATERAL: ICD-10-PCS | Mod: 26,,, | Performed by: RADIOLOGY

## 2022-02-23 PROCEDURE — 3288F PR FALLS RISK ASSESSMENT DOCUMENTED: ICD-10-PCS | Mod: CPTII,S$GLB,, | Performed by: FAMILY MEDICINE

## 2022-02-23 RX ORDER — TAMSULOSIN HYDROCHLORIDE 0.4 MG/1
CAPSULE ORAL
Qty: 45 CAPSULE | Refills: 11 | Status: SHIPPED | OUTPATIENT
Start: 2022-02-23 | End: 2022-03-14

## 2022-02-23 RX ORDER — ALLOPURINOL 100 MG/1
100 TABLET ORAL DAILY
Qty: 90 TABLET | Refills: 3 | Status: SHIPPED | OUTPATIENT
Start: 2022-02-23 | End: 2023-03-23

## 2022-02-23 NOTE — PROGRESS NOTES
Subjective:      Patient ID: Alexander Patel is a 77 y.o. male.    Chief Complaint: Headache and Neck Pain    HPI  Neck Pain: Paitent complains of neck pain. Event that precipitate these symptoms: none known. Onset of symptoms several months ago, gradually worsening since that time. Current symptoms are pain in the back of the neck (sharp in character; 4/10 in severity) and stiffness in the neck with looking up and down and left and right. . Patient denies numbness in the arms and no weakness in the arms. . Patient has had no prior neck problems.  Previous treatments include: none.    Problem List Items Addressed This Visit     Acute idiopathic gout of multiple sites    Overview     The patient has hyperuricemia.  This is tracked over time.  There is no current complaint of pain in the joint like redness or swelling.  Diet has been discussed to help prevent flares.  The patient takes medicine for this that is listed in the medcard.  The last uric acid level was   Lab Results   Component Value Date    URICACID 7.9 (H) 12/07/2020                Atherosclerosis of aorta    Overview     He has atherosclerosis noted on imaging in the past and no tx is needed at this time.           BPH (benign prostatic hyperplasia)    Overview     He has BPH and this has been stable.  He has to get up to the restroom once a night on average. The tamsulosin has helped.             Relevant Medications    tamsulosin (FLOMAX) 0.4 mg Cap    CKD (chronic kidney disease) stage 3, GFR 30-59 ml/min    Overview     Alexander Patel has an Estimated Glumerular Filtration Rate (EGFR) between 30 and 59 consistent with the definition of chronic kidney disease stage 3.    eGFR if non    Date Value Ref Range Status   04/23/2018 53.8 (A) >60 mL/min/1.73 m^2 Final     Comment:     Calculation used to obtain the estimated glomerular filtration  rate (eGFR) is the CKD-EPI equation.            Lab Results   Component Value Date    CREATININE  1.4 09/15/2021    BUN 20 09/15/2021     09/15/2021    K 5.2 (H) 09/15/2021     09/15/2021    CO2 22 (L) 09/15/2021       The patient's chronic kidney disease stage 3 was monitored, evaluated, addressed and/or treated.               PAF (paroxysmal atrial fibrillation)    Overview     He had an episode of afib in the past and he has not required anticoagulation and is not needing rate control.  He has not had stroke symptoms.             Other Visit Diagnoses     Neck pain    -  Primary    Relevant Orders    X-Ray Cervical Spine Complete 5 view    X-Ray Thoracic Spine AP Lateral    Kyphosis of thoracic region, unspecified kyphosis type        Relevant Orders    X-Ray Cervical Spine Complete 5 view    X-Ray Thoracic Spine AP Lateral    Acute idiopathic gout of left foot        Relevant Medications    allopurinoL (ZYLOPRIM) 100 MG tablet    Other Relevant Orders    Uric Acid    Hyperuricemia        Relevant Medications    allopurinoL (ZYLOPRIM) 100 MG tablet    Encounter for prostate cancer screening        Relevant Orders    PSA, Screening    Encounter for lipid screening for cardiovascular disease        Relevant Orders    Lipid Panel    Screening for diabetes mellitus        Relevant Orders    Comprehensive Metabolic Panel         Health Maintenance Due   Topic Date Due    Lipid Panel  03/05/2021    COVID-19 Vaccine (4 - Booster for Pfizer series) 03/05/2022       Past Medical History:  Past Medical History:   Diagnosis Date    Acute idiopathic gout of multiple sites 2/23/2022    BPH (benign prostatic hyperplasia)     Essential hypertension 8/28/2015    Gout 2012    Hyperlipidemia LDL goal < 130     Stroke 9/22/2021    This was found on the right parietal area on a CT of the brain.  This was found in September 2021. He previously was on aspirin 81 mg a day.       Past Surgical History:   Procedure Laterality Date    A-V CARDIAC PACEMAKER INSERTION N/A 8/23/2019    Procedure: INSERTION, CARDIAC  PACEMAKER, DUAL CHAMBER;  Surgeon: Manas Meade MD;  Location: [facility] CATH;  Service: Cardiology;  Laterality: N/A;    COLONOSCOPY N/A 10/8/2015    Procedure: COLONOSCOPY;  Surgeon: Avinash Lamb MD;  Location: [facility] ENDO;  Service: Endoscopy;  Laterality: N/A;    COLONOSCOPY N/A 2/25/2021    Procedure: COLONOSCOPY;  Surgeon: Avinash Lamb MD;  Location: [facility] ENDO;  Service: Endoscopy;  Laterality: N/A;    FRACTURE SURGERY      hand     Review of patient's allergies indicates:   Allergen Reactions    No known drug allergies      Current Outpatient Medications on File Prior to Visit   Medication Sig Dispense Refill    aspirin 81 MG chewable tablet Take 81 mg by mouth once daily. Every day      GLUCOSAMINE HCL/CHONDR LAM A NA (OSTEO BI-FLEX ORAL) Take 2 tablets by mouth once daily.       multivit-min/FA/lycopen/lutein (CENTRUM SILVER MEN ORAL) Take 1 tablet by mouth once daily.      [DISCONTINUED] allopurinoL (ZYLOPRIM) 100 MG tablet TAKE 1 TABLET BY MOUTH EVERY DAY 90 tablet 3    [DISCONTINUED] tamsulosin (FLOMAX) 0.4 mg Cap TAKE 1 CAPSULE EVERY OTHER THIRD DAY 45 capsule 11     No current facility-administered medications on file prior to visit.     Social History     Socioeconomic History    Marital status:      Spouse name: Modesta    Number of children: 2   Occupational History    Occupation: retired   Tobacco Use    Smoking status: Never Smoker    Smokeless tobacco: Former User     Types: Chew   Substance and Sexual Activity    Alcohol use: Yes     Comment: occasional    Drug use: No    Sexual activity: Yes     Family History   Problem Relation Age of Onset    Heart disease Mother     Alcohol abuse Father     Emphysema Father            Review of Systems   Constitutional: Negative.  Negative for chills, diaphoresis and fever.   HENT: Negative for congestion, hearing loss, mouth sores, postnasal drip and sore throat.    Eyes: Negative for pain and visual disturbance.  "  Respiratory: Negative for cough, chest tightness, shortness of breath and wheezing.    Cardiovascular: Negative for chest pain.   Gastrointestinal: Negative for abdominal pain, anal bleeding, blood in stool, constipation, diarrhea, nausea and vomiting.   Genitourinary: Negative for dysuria and hematuria.   Musculoskeletal: Positive for neck pain. Negative for back pain and neck stiffness.   Skin: Negative for rash.   Neurological: Negative for dizziness and weakness.       Objective:   /80   Pulse 60   Temp 97.8 °F (36.6 °C)   Ht 5' 5" (1.651 m)   Wt 75.8 kg (167 lb)   BMI 27.79 kg/m²     Physical Exam  Constitutional:       Appearance: He is well-developed. He is not diaphoretic.   HENT:      Head: Normocephalic and atraumatic.      Right Ear: External ear normal.      Left Ear: External ear normal.      Nose: Nose normal.      Mouth/Throat:      Pharynx: No oropharyngeal exudate.   Eyes:      General: No scleral icterus.        Right eye: No discharge.         Left eye: No discharge.      Conjunctiva/sclera: Conjunctivae normal.      Pupils: Pupils are equal, round, and reactive to light.   Neck:      Thyroid: No thyromegaly.      Vascular: No carotid bruit or JVD.      Comments: He has kyphosis of the thoracic area.  Cardiovascular:      Rate and Rhythm: Normal rate and regular rhythm.      Heart sounds: Normal heart sounds. No murmur heard.    No friction rub. No gallop.   Pulmonary:      Effort: Pulmonary effort is normal. No respiratory distress.      Breath sounds: Normal breath sounds. No wheezing or rales.   Chest:      Chest wall: No tenderness.   Abdominal:      General: Bowel sounds are normal. There is no distension.      Palpations: Abdomen is soft. There is no mass.      Tenderness: There is no abdominal tenderness. There is no guarding or rebound.   Musculoskeletal:         General: No tenderness.      Cervical back: Neck supple. No rigidity. Pain with movement, spinous process " tenderness and muscular tenderness present. Decreased range of motion.   Lymphadenopathy:      Cervical: No cervical adenopathy.   Skin:     General: Skin is warm and dry.   Neurological:      Mental Status: He is alert and oriented to person, place, and time.      Cranial Nerves: No cranial nerve deficit.      Coordination: Coordination normal.         Assessment:     1. Neck pain    2. Kyphosis of thoracic region, unspecified kyphosis type    3. PAF (paroxysmal atrial fibrillation)    4. Atherosclerosis of aorta    5. Stage 3 chronic kidney disease, unspecified whether stage 3a or 3b CKD    6. Acute idiopathic gout of left foot    7. Hyperuricemia    8. Benign prostatic hyperplasia, unspecified whether lower urinary tract symptoms present    9. Acute idiopathic gout of multiple sites    10. Encounter for prostate cancer screening    11. Encounter for lipid screening for cardiovascular disease    12. Screening for diabetes mellitus        Plan:     Problem List Items Addressed This Visit     Acute idiopathic gout of multiple sites    Atherosclerosis of aorta    BPH (benign prostatic hyperplasia)    Relevant Medications    tamsulosin (FLOMAX) 0.4 mg Cap    CKD (chronic kidney disease) stage 3, GFR 30-59 ml/min    PAF (paroxysmal atrial fibrillation)      Other Visit Diagnoses     Neck pain    -  Primary    Relevant Orders    X-Ray Cervical Spine Complete 5 view    X-Ray Thoracic Spine AP Lateral    Kyphosis of thoracic region, unspecified kyphosis type        Relevant Orders    X-Ray Cervical Spine Complete 5 view    X-Ray Thoracic Spine AP Lateral    Acute idiopathic gout of left foot        Relevant Medications    allopurinoL (ZYLOPRIM) 100 MG tablet    Other Relevant Orders    Uric Acid    Hyperuricemia        Relevant Medications    allopurinoL (ZYLOPRIM) 100 MG tablet    Encounter for prostate cancer screening        Relevant Orders    PSA, Screening    Encounter for lipid screening for cardiovascular disease         Relevant Orders    Lipid Panel    Screening for diabetes mellitus        Relevant Orders    Comprehensive Metabolic Panel        No follow-ups on file.      I have changed Alexander Patel's allopurinoL. I am also having him maintain his aspirin, GLUCOSAMINE HCL/CHONDR LAM A NA (OSTEO BI-FLEX ORAL), multivit-min/FA/lycopen/lutein (CENTRUM SILVER MEN ORAL), and tamsulosin.    Alexander was seen today for headache and neck pain.    Diagnoses and all orders for this visit:    Neck pain  -     X-Ray Cervical Spine Complete 5 view; Future  -     X-Ray Thoracic Spine AP Lateral; Future    Kyphosis of thoracic region, unspecified kyphosis type  -     X-Ray Cervical Spine Complete 5 view; Future  -     X-Ray Thoracic Spine AP Lateral; Future    PAF (paroxysmal atrial fibrillation)    Atherosclerosis of aorta    Stage 3 chronic kidney disease, unspecified whether stage 3a or 3b CKD    Acute idiopathic gout of left foot  -     allopurinoL (ZYLOPRIM) 100 MG tablet; Take 1 tablet (100 mg total) by mouth once daily.  -     Uric Acid; Future    Hyperuricemia  -     allopurinoL (ZYLOPRIM) 100 MG tablet; Take 1 tablet (100 mg total) by mouth once daily.    Benign prostatic hyperplasia, unspecified whether lower urinary tract symptoms present  -     tamsulosin (FLOMAX) 0.4 mg Cap; TAKE 1 CAPSULE EVERY OTHER THIRD DAY    Acute idiopathic gout of multiple sites    Encounter for prostate cancer screening  -     PSA, Screening; Future    Encounter for lipid screening for cardiovascular disease  -     Lipid Panel; Future    Screening for diabetes mellitus  -     Comprehensive Metabolic Panel; Future      Medications Ordered This Encounter   Medications    allopurinoL (ZYLOPRIM) 100 MG tablet     Sig: Take 1 tablet (100 mg total) by mouth once daily.     Dispense:  90 tablet     Refill:  3    tamsulosin (FLOMAX) 0.4 mg Cap     Sig: TAKE 1 CAPSULE EVERY OTHER THIRD DAY     Dispense:  45 capsule     Refill:  11     Requesting 1 year  supply     The patient was instructed to stop the following meds:  Medications Discontinued During This Encounter   Medication Reason    tamsulosin (FLOMAX) 0.4 mg Cap Reorder    allopurinoL (ZYLOPRIM) 100 MG tablet Reorder     Orders Placed This Encounter   Procedures    X-Ray Cervical Spine Complete 5 view     Standing Status:   Future     Standing Expiration Date:   2/23/2023     Order Specific Question:   Reason for Exam:     Answer:   pain    X-Ray Thoracic Spine AP Lateral     Standing Status:   Future     Standing Expiration Date:   2/23/2023     Order Specific Question:   Reason for Exam:     Answer:   pain    Comprehensive Metabolic Panel     Standing Status:   Future     Standing Expiration Date:   2/23/2023    Lipid Panel     Standing Status:   Future     Standing Expiration Date:   2/23/2023    PSA, Screening     Standing Status:   Future     Standing Expiration Date:   2/24/2023    Uric Acid     Standing Status:   Future     Standing Expiration Date:   4/24/2023

## 2022-02-24 ENCOUNTER — PATIENT MESSAGE (OUTPATIENT)
Dept: FAMILY MEDICINE | Facility: CLINIC | Age: 77
End: 2022-02-24
Payer: MEDICARE

## 2022-02-24 DIAGNOSIS — N18.30 STAGE 3 CHRONIC KIDNEY DISEASE, UNSPECIFIED WHETHER STAGE 3A OR 3B CKD: Primary | ICD-10-CM

## 2022-02-24 DIAGNOSIS — M50.30 DDD (DEGENERATIVE DISC DISEASE), CERVICAL: Primary | ICD-10-CM

## 2022-02-24 DIAGNOSIS — M54.2 NECK PAIN: ICD-10-CM

## 2022-02-24 NOTE — PROGRESS NOTES
There is wedging of the upper thoracic vertebrae and this is what is giving the kyphosis or forward bend to the upper spine.  This does not appear to be recent and does appear to be degenerative in nature.  See the xray of the neck report.

## 2022-02-24 NOTE — PROGRESS NOTES
There appears to be severe arthritic changes and degenerative disc disease at C5-6..  I recommend that we study this further with an MRI. If agreeable, orders will be in place to get it scheduled.Based on the recommendations above, orders have been placed.

## 2022-02-25 ENCOUNTER — PATIENT MESSAGE (OUTPATIENT)
Dept: FAMILY MEDICINE | Facility: CLINIC | Age: 77
End: 2022-02-25
Payer: MEDICARE

## 2022-02-25 ENCOUNTER — CLINICAL SUPPORT (OUTPATIENT)
Dept: CARDIOLOGY | Facility: HOSPITAL | Age: 77
End: 2022-02-25
Payer: MEDICARE

## 2022-02-25 ENCOUNTER — TELEPHONE (OUTPATIENT)
Dept: FAMILY MEDICINE | Facility: CLINIC | Age: 77
End: 2022-02-25
Payer: MEDICARE

## 2022-02-25 DIAGNOSIS — Z95.0 PRESENCE OF CARDIAC PACEMAKER: ICD-10-CM

## 2022-02-25 PROCEDURE — 93296 REM INTERROG EVL PM/IDS: CPT | Mod: PO | Performed by: INTERNAL MEDICINE

## 2022-02-25 NOTE — TELEPHONE ENCOUNTER
----- Message from Avinash Lamb MD sent at 2/24/2022  8:16 PM CST -----  The current value for the PSA is considered normal.  Please recheck this in 1 year.     The electrolytes are all stable except for the kidney function which is a little lower at this time.  I would like for you to be seen by a kidney specialist if you will agree to this.  It has been this low in the past but had improved.  I would like for this to be tracked by them over time also.   Avoid anti-inflammatories.   Your uric acid level is high but since you have not had a flare in a while, I will not change your zyloprim at this time (allopurinol).     The cholesterol is a little high but you do not need to take additional medicine at this time.  Recheck in 1 year.

## 2022-03-01 NOTE — TELEPHONE ENCOUNTER
He absolutely cannot have an MRI with his pacemaker.  He noted it so this needs to be cancelled.  He needs to have a CT of the cervical spine instead.  I will place an order for this.  I have signed for the following orders AND/OR meds.  Please call the patient and ask the patient to schedule the testing AND/OR inform about any medications that were sent.      Orders Placed This Encounter   Procedures    CT Cervical Spine Without Contrast     Standing Status:   Future     Standing Expiration Date:   3/1/2023     Order Specific Question:   May the Radiologist modify the order per protocol to meet the clinical needs of the patient?     Answer:   Yes

## 2022-03-07 DIAGNOSIS — N40.0 BENIGN PROSTATIC HYPERPLASIA, UNSPECIFIED WHETHER LOWER URINARY TRACT SYMPTOMS PRESENT: ICD-10-CM

## 2022-03-07 NOTE — TELEPHONE ENCOUNTER
No new care gaps identified.  Powered by [employer] [facility]. Reference number: [id number].   3/07/2022 11:20:51 AM CST

## 2022-03-14 RX ORDER — TAMSULOSIN HYDROCHLORIDE 0.4 MG/1
CAPSULE ORAL
Qty: 90 CAPSULE | Refills: 3 | Status: SHIPPED | OUTPATIENT
Start: 2022-03-14 | End: 2023-04-25

## 2022-03-15 ENCOUNTER — HOSPITAL ENCOUNTER (OUTPATIENT)
Dept: RADIOLOGY | Facility: HOSPITAL | Age: 77
Discharge: HOME | End: 2022-03-15
Attending: FAMILY MEDICINE
Payer: MEDICARE

## 2022-03-15 DIAGNOSIS — M50.30 DDD (DEGENERATIVE DISC DISEASE), CERVICAL: ICD-10-CM

## 2022-03-15 PROCEDURE — 72125 CT NECK SPINE W/O DYE: CPT | Mod: 26,,, | Performed by: RADIOLOGY

## 2022-03-15 PROCEDURE — 72125 CT CERVICAL SPINE WITHOUT CONTRAST: ICD-10-PCS | Mod: 26,,, | Performed by: RADIOLOGY

## 2022-03-15 PROCEDURE — 72125 CT NECK SPINE W/O DYE: CPT | Mod: TC,PO

## 2022-03-15 NOTE — TELEPHONE ENCOUNTER
Refill Authorization Note   Alexander Patel  is requesting a refill authorization.  Brief Assessment and Rationale for Refill:  Approve     Medication Therapy Plan:  approve    Medication Reconciliation Completed: No   Comments:   --->Care Gap information included below if applicable.       Requested Prescriptions   Pending Prescriptions Disp Refills    tamsulosin (FLOMAX) 0.4 mg Cap [Pharmacy Med Name: Tamsulosin HCl 0.4 MG Oral Capsule] 90 capsule 3     Sig: TAKE 1 CAPSULE BY MOUTH  EVERY THIRD DAY       Urology: Alpha-Adrenergic Blocker Passed - 3/14/2022  9:02 PM        Passed - Patient is at least 18 years old        Passed - Prostate Cancer is not on problem list        Passed - BP > 90/50 mmH     BP Readings from Last 1 Encounters:   02/23/22 132/80               Passed - Valid encounter within last 15 months     Recent Visits  Date Type Provider Dept   02/23/22 Office Visit Avinash Lamb MD [facility]   09/15/21 Office Visit Avinash Lamb MD [facility]   12/24/20 Office Visit Avinash Lamb MD [facility]   08/17/20 Office Visit Avinash Lamb MD [facility]   Showing recent visits within past 720 days and meeting all other requirements  Future Appointments  No visits were found meeting these conditions.  Showing future appointments within next 150 days and meeting all other requirements      Future Appointments              Tomorrow [facility] CT1 LIMIT 500 LBS [place]    In 2 months HOME MONITOR DEVICE CHECK, [facility] [place] [facility], [place]    In 6 months PACEMAKER, [place] [facility], [place]    In 6 months Caroline Wynne PA-C [place], [place]                    Appointments  past 12m or future 3m with PCP    Date Provider   Last Visit   2/23/2022 Avinash Lamb MD   Next Visit   Visit date not found Avinash Lamb MD   ED visits in past 90 days: 0     Note composed:9:03 PM 03/14/2022

## 2022-03-15 NOTE — PROGRESS NOTES
"There is significant arthritis noted in the lower cervical spine and the disc space narrowing is "marked" at c5-6.  I recommend and evaluation of this with a neurosurgeon.  If agreeable, we need to place a referral.  "

## 2022-03-17 ENCOUNTER — TELEPHONE (OUTPATIENT)
Dept: FAMILY MEDICINE | Facility: CLINIC | Age: 77
End: 2022-03-17
Payer: MEDICARE

## 2022-03-17 NOTE — TELEPHONE ENCOUNTER
----- Message from Martha Gonzalez sent at 3/17/2022 11:57 AM CDT -----  Contact: wife/ [phone]  Type:  Patient Returning Call    Who Called:Modesta herrera   Who Left Message for Patient:nurse   Does the patient know what this is regarding?:test results   Would the patient rather a call back or a response via [facility]? Call back    Best Call Back Number:[phone]  Additional Information:

## 2022-03-18 ENCOUNTER — TELEPHONE (OUTPATIENT)
Dept: FAMILY MEDICINE | Facility: CLINIC | Age: 77
End: 2022-03-18
Payer: MEDICARE

## 2022-03-18 DIAGNOSIS — M50.30 DDD (DEGENERATIVE DISC DISEASE), CERVICAL: Primary | ICD-10-CM

## 2022-03-18 NOTE — TELEPHONE ENCOUNTER
----- Message from Bela Montilla sent at 3/18/2022  9:18 AM CDT -----  Contact: Wife  Type: Patient Call Back         Who called: Wife         What is the request in detail: returning missed calls from staff regarding patient and test results; please advise         Best call back number: [phone]         Additional Information: states MRI was done Tues 3/15           Thank You

## 2022-03-19 NOTE — TELEPHONE ENCOUNTER
I have signed for the following orders AND/OR meds.  Please call the patient and ask the patient to schedule the testing AND/OR inform about any medications that were sent.      Orders Placed This Encounter   Procedures    Ambulatory referral/consult to Neurosurgery     Standing Status:   Future     Standing Expiration Date:   4/18/2023     Referral Priority:   Routine     Referral Type:   Consultation     Referral Reason:   Specialty Services Required     Requested Specialty:   Neurosurgery     Number of Visits Requested:   1

## 2022-03-21 ENCOUNTER — TELEPHONE (OUTPATIENT)
Dept: FAMILY MEDICINE | Facility: CLINIC | Age: 77
End: 2022-03-21
Payer: MEDICARE

## 2022-03-21 NOTE — TELEPHONE ENCOUNTER
----- Message from Bhumika Flores sent at 3/21/2022 11:08 AM CDT -----  Pt's wife (Modesta) stated the appt tomorrow is a conflict with something else her  had scheduled. Please call her back at .[phone]. Thx. El

## 2022-03-28 ENCOUNTER — OFFICE VISIT (OUTPATIENT)
Dept: NEUROSURGERY | Facility: CLINIC | Age: 77
End: 2022-03-28
Payer: MEDICARE

## 2022-03-28 VITALS
WEIGHT: 167.13 LBS | SYSTOLIC BLOOD PRESSURE: 132 MMHG | RESPIRATION RATE: 18 BRPM | BODY MASS INDEX: 27.85 KG/M2 | HEIGHT: 65 IN | DIASTOLIC BLOOD PRESSURE: 76 MMHG | HEART RATE: 60 BPM

## 2022-03-28 DIAGNOSIS — M50.30 DDD (DEGENERATIVE DISC DISEASE), CERVICAL: ICD-10-CM

## 2022-03-28 PROCEDURE — 99204 PR OFFICE/OUTPT VISIT, NEW, LEVL IV, 45-59 MIN: ICD-10-PCS | Mod: S$GLB,,, | Performed by: PHYSICIAN ASSISTANT

## 2022-03-28 PROCEDURE — 1125F AMNT PAIN NOTED PAIN PRSNT: CPT | Mod: CPTII,S$GLB,, | Performed by: PHYSICIAN ASSISTANT

## 2022-03-28 PROCEDURE — 3075F SYST BP GE 130 - 139MM HG: CPT | Mod: CPTII,S$GLB,, | Performed by: PHYSICIAN ASSISTANT

## 2022-03-28 PROCEDURE — 1159F PR MEDICATION LIST DOCUMENTED IN MEDICAL RECORD: ICD-10-PCS | Mod: CPTII,S$GLB,, | Performed by: PHYSICIAN ASSISTANT

## 2022-03-28 PROCEDURE — 3075F PR MOST RECENT SYSTOLIC BLOOD PRESS GE 130-139MM HG: ICD-10-PCS | Mod: CPTII,S$GLB,, | Performed by: PHYSICIAN ASSISTANT

## 2022-03-28 PROCEDURE — 3078F PR MOST RECENT DIASTOLIC BLOOD PRESSURE < 80 MM HG: ICD-10-PCS | Mod: CPTII,S$GLB,, | Performed by: PHYSICIAN ASSISTANT

## 2022-03-28 PROCEDURE — 1157F PR ADVANCE CARE PLAN OR EQUIV PRESENT IN MEDICAL RECORD: ICD-10-PCS | Mod: CPTII,S$GLB,, | Performed by: PHYSICIAN ASSISTANT

## 2022-03-28 PROCEDURE — 1157F ADVNC CARE PLAN IN RCRD: CPT | Mod: CPTII,S$GLB,, | Performed by: PHYSICIAN ASSISTANT

## 2022-03-28 PROCEDURE — 1159F MED LIST DOCD IN RCRD: CPT | Mod: CPTII,S$GLB,, | Performed by: PHYSICIAN ASSISTANT

## 2022-03-28 PROCEDURE — 3288F PR FALLS RISK ASSESSMENT DOCUMENTED: ICD-10-PCS | Mod: CPTII,S$GLB,, | Performed by: PHYSICIAN ASSISTANT

## 2022-03-28 PROCEDURE — 1101F PT FALLS ASSESS-DOCD LE1/YR: CPT | Mod: CPTII,S$GLB,, | Performed by: PHYSICIAN ASSISTANT

## 2022-03-28 PROCEDURE — 99204 OFFICE O/P NEW MOD 45 MIN: CPT | Mod: S$GLB,,, | Performed by: PHYSICIAN ASSISTANT

## 2022-03-28 PROCEDURE — 1125F PR PAIN SEVERITY QUANTIFIED, PAIN PRESENT: ICD-10-PCS | Mod: CPTII,S$GLB,, | Performed by: PHYSICIAN ASSISTANT

## 2022-03-28 PROCEDURE — 3078F DIAST BP <80 MM HG: CPT | Mod: CPTII,S$GLB,, | Performed by: PHYSICIAN ASSISTANT

## 2022-03-28 PROCEDURE — 3288F FALL RISK ASSESSMENT DOCD: CPT | Mod: CPTII,S$GLB,, | Performed by: PHYSICIAN ASSISTANT

## 2022-03-28 PROCEDURE — 1101F PR PT FALLS ASSESS DOC 0-1 FALLS W/OUT INJ PAST YR: ICD-10-PCS | Mod: CPTII,S$GLB,, | Performed by: PHYSICIAN ASSISTANT

## 2022-03-28 NOTE — PROGRESS NOTES
Neurosurgery History & Physical    Patient ID: Alexander Patel is a 77 y.o. male.    Chief Complaint   Patient presents with    Cervical Spine Pain (C-spine)     Neck pain doesn't radiate; neck pops a lot       Review of Systems   Constitutional: Negative for chills, diaphoresis, fatigue and fever.   HENT: Negative for congestion, hearing loss, rhinorrhea and sore throat.    Eyes: Negative for photophobia, pain, redness and visual disturbance.   Respiratory: Negative for cough, chest tightness, shortness of breath and wheezing.    Cardiovascular: Negative for chest pain, palpitations and leg swelling.   Gastrointestinal: Negative for abdominal distention, abdominal pain, constipation, diarrhea, nausea and vomiting.   Genitourinary: Negative for difficulty urinating, dysuria, frequency, hematuria and urgency.   Musculoskeletal: Positive for neck pain. Negative for back pain, gait problem, myalgias and neck stiffness.   Skin: Negative for pallor and rash.   Neurological: Negative for dizziness, seizures, speech difficulty, weakness, light-headedness, numbness and headaches.   Psychiatric/Behavioral: Negative for confusion, hallucinations and sleep disturbance.       Past Medical History:   Diagnosis Date    Acute idiopathic gout of multiple sites 2/23/2022    BPH (benign prostatic hyperplasia)     Essential hypertension 8/28/2015    Gout 2012    Hyperlipidemia LDL goal < 130     Stroke 9/22/2021    This was found on the right parietal area on a CT of the brain.  This was found in September 2021. He previously was on aspirin 81 mg a day.       Social History     Socioeconomic History    Marital status:      Spouse name: Modesta    Number of children: 2   Occupational History    Occupation: retired   Tobacco Use    Smoking status: Never Smoker    Smokeless tobacco: Former User     Types: Chew   Substance and Sexual Activity    Alcohol use: Yes     Comment: occasional    Drug use: No    Sexual activity:  "Yes     Family History   Problem Relation Age of Onset    Heart disease Mother     Alcohol abuse Father     Emphysema Father      Review of patient's allergies indicates:   Allergen Reactions    No known drug allergies        Current Outpatient Medications:     allopurinoL (ZYLOPRIM) 100 MG tablet, Take 1 tablet (100 mg total) by mouth once daily., Disp: 90 tablet, Rfl: 3    aspirin 81 MG chewable tablet, Take 81 mg by mouth once daily. Every day, Disp: , Rfl:     GLUCOSAMINE HCL/CHONDR LAM A NA (OSTEO BI-FLEX ORAL), Take 2 tablets by mouth once daily. , Disp: , Rfl:     multivit-min/FA/lycopen/lutein (CENTRUM SILVER MEN ORAL), Take 1 tablet by mouth once daily., Disp: , Rfl:     tamsulosin (FLOMAX) 0.4 mg Cap, TAKE 1 CAPSULE BY MOUTH  EVERY THIRD DAY, Disp: 90 capsule, Rfl: 3  Blood pressure 132/76, pulse 60, resp. rate 18, height 5' 5" (1.651 m), weight 75.8 kg (167 lb 1.7 oz).      Neurologic Exam     Mental Status   Oriented to person, place, and time.   Oriented to person.   Oriented to place.   Oriented to time.   Follows 3 step commands.   Attention: normal. Concentration: normal.   Speech: speech is normal   Level of consciousness: alert  Knowledge: consistent with education.   Able to name object. Able to read. Able to repeat. Able to write. Normal comprehension.      Cranial Nerves      CN II   Visual acuity: normal  Right visual field deficit: none  Left visual field deficit: none      CN III, IV, VI   Pupils are equal, round, and reactive to light.  Right pupil: Size: 3 mm. Shape: regular. Reactivity: brisk. Consensual response: intact.   Left pupil: Size: 3 mm. Shape: regular. Reactivity: brisk. Consensual response: intact.   CN III: no CN III palsy  CN VI: no CN VI palsy  Nystagmus: none   Diplopia: none  Ophthalmoparesis: none  Conjugate gaze: present     CN V   Right facial sensation deficit: none  Left facial sensation deficit: none     CN VII   Right facial weakness: none  Left facial " weakness: none     CN VIII   Hearing: intact     CN IX, X   CN IX normal.   CN X normal.      CN XI   Right sternocleidomastoid strength: normal  Left sternocleidomastoid strength: normal  Right trapezius strength: normal  Left trapezius strength: normal     CN XII   Fasciculations: absent  Tongue deviation: none     Motor Exam   Muscle bulk: normal  Overall muscle tone: normal  Right arm pronator drift: absent  Left arm pronator drift: absent     Strength   Right deltoid: 5/5  Left deltoid: 5/5  Right biceps: 5/5  Left biceps: 5/5  Right triceps: 5/5  Left triceps: 5/5  Right wrist flexion: 5/5  Left wrist flexion: 5/5  Right wrist extension: 5/5  Left wrist extension: 5/5  Right interossei: 5/5  Left interossei: 5/5  Right iliopsoas: 5/5  Left iliopsoas: 5/5  Right quadriceps: 5/5  Left quadriceps: 5/5  Right hamstrin/5  Left hamstrin/5  Right anterior tibial: 5/5  Left anterior tibial: 5/5  Right posterior tibial: 5/5  Left posterior tibial: 5/5  Right peroneal: 5/5  Left peroneal: 5/5  Right gastroc: 5/5  Left gastroc: 5/5     Sensory Exam   Right arm light touch: normal  Left arm light touch: normal  Right leg light touch: normal  Left leg light touch: normal     Gait, Coordination, and Reflexes      Gait  Gait: normal      Coordination   Romberg: negative  Finger to nose coordination: normal  Heel to shin coordination: normal  Tandem walking coordination: normal     Tremor   Resting tremor: absent  Intention tremor: absent  Action tremor: absent     Reflexes   Right brachioradialis: 0  Left brachioradialis: 0  Right biceps: 0  Left biceps: 0  Right triceps: 0  Left triceps: 0  Right patellar: 0  Left patellar: 0  Right achilles: 0  Left achilles: 0  Right Gooden: absent  Left Gooden: absent  Right ankle clonus: absent  Left ankle clonus: absent  Right plantar: normal  Left plantar: normal      Physical Exam  Constitutional: Oriented to person, place, and time. Appears well-developed and  well-nourished.   HENT:   Head: Normocephalic and atraumatic.   Eyes: Pupils are equal, round, and reactive to light.   Neck: Normal range of motion. Neck supple.   Cardiovascular: Normal rate.    Pulmonary/Chest: Effort normal.   Musculoskeletal: Normal range of motion. Exhibits no edema.   Neurological: Alert and oriented to person, place, and time. Normal Finger-Nose-Finger Test, a normal Heel to Shin Test, a normal Romberg Test and a normal Tandem Gait Test. Gait normal.   Reflex Scores:       Tricep reflexes are 0 on the right side and 0 on the left side.       Bicep reflexes are 0 on the right side and 0 on the left side.       Brachioradialis reflexes are 0 on the right side and 0 on the left side.       Patellar reflexes are 0 on the right side and 0 on the left side.       Achilles reflexes are 0 on the right side and 0 on the left side.  Skin: Skin is warm, dry and intact.   Psychiatric: Normal mood and affect. Speech is normal and behavior is normal. Judgment and thought content normal.   Nursing note and vitals reviewed.        Provider dictation:  I reviewed the imaging.   CT Cervical spine shows disc degeneration most severe at C5-6; trace anterolisthesis C3-4 and C6-7.     The patient is a 77 year old male who presents for neurosurgical consultation referred by Dr. Lamb. He reports several month history of neck pain and neck stiffness. He denies upper extremity pain, numbness, or weakness. Denies dropping objects from his hands or gait instability. Denies difficulty with fine motor movements. He has not undergone recent PT or CRISTINO for his neck pain. His PCP ordered x-rays and a CT cervical spine. Patient was referred here for further evaluation. Patient is unable to have an MRI done due to pacemaker.     On exam patient has full strength and no sensory deficits. Diffusely hyporeflexic. No signs of myelopathy on exam.     I offered to obtain a CT myelogram of the cervical spine to rule out any  compressive pathology since we are unable to obtain an MRI. Patient currently defers since his symptoms are manageable at this time. I am sending a referral to physical therapy for treatment. He was counseled on signs/symptoms of cervical myelopathy and to contact us if he develops any of these. He can follow-up as needed.       1. DDD (degenerative disc disease), cervical  Ambulatory referral/consult to Neurosurgery    Ambulatory referral/consult to Physical/Occupational Therapy

## 2022-04-17 NOTE — TELEPHONE ENCOUNTER
I have signed for the following orders AND/OR meds.  Please call the patient and ask the patient to schedule the testing AND/OR inform about any medications that were sent.      Orders Placed This Encounter   Procedures    Comprehensive metabolic panel     Standing Status:   Standing     Number of Occurrences:   99     Standing Expiration Date:   11/28/2039           [place] to call system/Call bell, personal items and telephone within reach/Instruct patient to call for assistance/Room bathroom lighting operational/Non-slip footwear when patient is off stretcher/Physically safe environment: no spills, clutter or unnecessary equipment/Stretcher in lowest position, wheels locked, appropriate side rails in place/Provide visual cue, wrist band, yellow gown, etc./Monitor gait and stability/Monitor for mental status changes and reorient to person, place, and time

## 2022-05-26 ENCOUNTER — CLINICAL SUPPORT (OUTPATIENT)
Dept: CARDIOLOGY | Facility: HOSPITAL | Age: 77
End: 2022-05-26
Payer: MEDICARE

## 2022-05-26 ENCOUNTER — PATIENT MESSAGE (OUTPATIENT)
Dept: FAMILY MEDICINE | Facility: CLINIC | Age: 77
End: 2022-05-26
Payer: MEDICARE

## 2022-05-26 DIAGNOSIS — Z95.0 PRESENCE OF CARDIAC PACEMAKER: ICD-10-CM

## 2022-05-26 PROCEDURE — 93294 CARDIAC DEVICE CHECK - REMOTE: ICD-10-PCS | Mod: ,,, | Performed by: INTERNAL MEDICINE

## 2022-05-26 PROCEDURE — 93296 REM INTERROG EVL PM/IDS: CPT | Mod: PO | Performed by: INTERNAL MEDICINE

## 2022-05-26 PROCEDURE — 93294 REM INTERROG EVL PM/LDLS PM: CPT | Mod: ,,, | Performed by: INTERNAL MEDICINE

## 2022-06-17 ENCOUNTER — TELEPHONE (OUTPATIENT)
Dept: CARDIOLOGY | Facility: HOSPITAL | Age: 77
End: 2022-06-17
Payer: MEDICARE

## 2022-06-17 NOTE — TELEPHONE ENCOUNTER
Spoke with pt's wife and informed her pt's HM is 3G and will need to order a new one.  Will send to address on file.  Made appt to have device reprogrammed

## 2022-06-27 ENCOUNTER — TELEPHONE (OUTPATIENT)
Dept: CARDIOLOGY | Facility: HOSPITAL | Age: 77
End: 2022-06-27
Payer: MEDICARE

## 2022-06-27 NOTE — TELEPHONE ENCOUNTER
----- Message from Ludmila Mckeon LPN sent at 6/27/2022  1:28 PM CDT -----  Contact: Modesta    ----- Message -----  From: Swetha Castellanos  Sent: 6/27/2022  12:39 PM CDT  To: Desha[facility] Staff    Modesta is requesting a call. She stated pt is suppose to come in for monitor check, but has not received new monitor yet. Please call her back at [phone].            Thanks  DD

## 2022-06-27 NOTE — TELEPHONE ENCOUNTER
Reordered HM for pt.  Gladys from [email or web address] said pt does not need to come into clinic for reprogramming.  Should be there 3 - 5 business days

## 2022-06-27 NOTE — TELEPHONE ENCOUNTER
Called [facility] re: home monitor status.  Reordered HM. Should be there by the end of the week.  [facility] stated pt does not need to come into clinic for reprogramming.  Canceled appt for Thursday to reprogram.      She also stated she wanted to make an appt for Dr. Meade.  She said it is on the low side.  Informed first avail ible appt Sept 8th.  Scheduled appt with Caroline Wynne for 7/11/22.  Pt accepted date and time. She is also going to call his primary doctor to see if they have a sooner appt.  Aware of ER protocol.

## 2022-06-30 ENCOUNTER — LAB VISIT (OUTPATIENT)
Dept: LAB | Facility: HOSPITAL | Age: 77
End: 2022-06-30
Attending: NURSE PRACTITIONER
Payer: MEDICARE

## 2022-06-30 ENCOUNTER — OFFICE VISIT (OUTPATIENT)
Dept: FAMILY MEDICINE | Facility: CLINIC | Age: 77
End: 2022-06-30
Payer: MEDICARE

## 2022-06-30 ENCOUNTER — TELEPHONE (OUTPATIENT)
Dept: CARDIOLOGY | Facility: HOSPITAL | Age: 77
End: 2022-06-30
Payer: MEDICARE

## 2022-06-30 VITALS
SYSTOLIC BLOOD PRESSURE: 130 MMHG | BODY MASS INDEX: 26.99 KG/M2 | TEMPERATURE: 98 F | DIASTOLIC BLOOD PRESSURE: 62 MMHG | HEART RATE: 60 BPM | HEIGHT: 65 IN | WEIGHT: 162 LBS

## 2022-06-30 DIAGNOSIS — R42 EPISODIC LIGHTHEADEDNESS: ICD-10-CM

## 2022-06-30 DIAGNOSIS — H53.123 TRANSIENT VISUAL LOSS, BILATERAL: ICD-10-CM

## 2022-06-30 DIAGNOSIS — I95.9 HYPOTENSION, UNSPECIFIED HYPOTENSION TYPE: Primary | ICD-10-CM

## 2022-06-30 DIAGNOSIS — R53.83 FATIGUE, UNSPECIFIED TYPE: ICD-10-CM

## 2022-06-30 DIAGNOSIS — Z95.0 CARDIAC PACEMAKER IN SITU: ICD-10-CM

## 2022-06-30 DIAGNOSIS — I70.0 ATHEROSCLEROSIS OF AORTA: ICD-10-CM

## 2022-06-30 DIAGNOSIS — I95.9 HYPOTENSION, UNSPECIFIED HYPOTENSION TYPE: ICD-10-CM

## 2022-06-30 LAB
ANION GAP SERPL CALC-SCNC: 8 MMOL/L (ref 8–16)
BASOPHILS # BLD AUTO: 0.03 K/UL (ref 0–0.2)
BASOPHILS NFR BLD: 0.5 % (ref 0–1.9)
BUN SERPL-MCNC: 25 MG/DL (ref 8–23)
CALCIUM SERPL-MCNC: 9.3 MG/DL (ref 8.7–10.5)
CHLORIDE SERPL-SCNC: 108 MMOL/L (ref 95–110)
CO2 SERPL-SCNC: 23 MMOL/L (ref 23–29)
CREAT SERPL-MCNC: 1.5 MG/DL (ref 0.5–1.4)
DIFFERENTIAL METHOD: ABNORMAL
EOSINOPHIL # BLD AUTO: 0.1 K/UL (ref 0–0.5)
EOSINOPHIL NFR BLD: 2.2 % (ref 0–8)
ERYTHROCYTE [DISTWIDTH] IN BLOOD BY AUTOMATED COUNT: 12.9 % (ref 11.5–14.5)
EST. GFR  (AFRICAN AMERICAN): 50.8 ML/MIN/1.73 M^2
EST. GFR  (NON AFRICAN AMERICAN): 44 ML/MIN/1.73 M^2
GLUCOSE SERPL-MCNC: 97 MG/DL (ref 70–110)
HCT VFR BLD AUTO: 36.7 % (ref 40–54)
HGB BLD-MCNC: 11.8 G/DL (ref 14–18)
IMM GRANULOCYTES # BLD AUTO: 0.02 K/UL (ref 0–0.04)
IMM GRANULOCYTES NFR BLD AUTO: 0.4 % (ref 0–0.5)
LYMPHOCYTES # BLD AUTO: 1.3 K/UL (ref 1–4.8)
LYMPHOCYTES NFR BLD: 24 % (ref 18–48)
MCH RBC QN AUTO: 30.1 PG (ref 27–31)
MCHC RBC AUTO-ENTMCNC: 32.2 G/DL (ref 32–36)
MCV RBC AUTO: 94 FL (ref 82–98)
MONOCYTES # BLD AUTO: 0.5 K/UL (ref 0.3–1)
MONOCYTES NFR BLD: 9.7 % (ref 4–15)
NEUTROPHILS # BLD AUTO: 3.5 K/UL (ref 1.8–7.7)
NEUTROPHILS NFR BLD: 63.2 % (ref 38–73)
NRBC BLD-RTO: 0 /100 WBC
PLATELET # BLD AUTO: 236 K/UL (ref 150–450)
PMV BLD AUTO: 10.9 FL (ref 9.2–12.9)
POTASSIUM SERPL-SCNC: 4.7 MMOL/L (ref 3.5–5.1)
RBC # BLD AUTO: 3.92 M/UL (ref 4.6–6.2)
SODIUM SERPL-SCNC: 139 MMOL/L (ref 136–145)
TSH SERPL DL<=0.005 MIU/L-ACNC: 0.93 UIU/ML (ref 0.4–4)
WBC # BLD AUTO: 5.58 K/UL (ref 3.9–12.7)

## 2022-06-30 PROCEDURE — 1160F RVW MEDS BY RX/DR IN RCRD: CPT | Mod: CPTII,S$GLB,, | Performed by: NURSE PRACTITIONER

## 2022-06-30 PROCEDURE — 99214 PR OFFICE/OUTPT VISIT, EST, LEVL IV, 30-39 MIN: ICD-10-PCS | Mod: S$GLB,,, | Performed by: NURSE PRACTITIONER

## 2022-06-30 PROCEDURE — 3075F SYST BP GE 130 - 139MM HG: CPT | Mod: CPTII,S$GLB,, | Performed by: NURSE PRACTITIONER

## 2022-06-30 PROCEDURE — 1157F ADVNC CARE PLAN IN RCRD: CPT | Mod: CPTII,S$GLB,, | Performed by: NURSE PRACTITIONER

## 2022-06-30 PROCEDURE — 93010 ELECTROCARDIOGRAM REPORT: CPT | Mod: S$GLB,,, | Performed by: INTERNAL MEDICINE

## 2022-06-30 PROCEDURE — 99999 PR PBB SHADOW E&M-EST. PATIENT-LVL IV: ICD-10-PCS | Mod: PBBFAC,,, | Performed by: NURSE PRACTITIONER

## 2022-06-30 PROCEDURE — 84443 ASSAY THYROID STIM HORMONE: CPT | Performed by: NURSE PRACTITIONER

## 2022-06-30 PROCEDURE — 80048 BASIC METABOLIC PNL TOTAL CA: CPT | Performed by: NURSE PRACTITIONER

## 2022-06-30 PROCEDURE — 93005 EKG 12-LEAD: ICD-10-PCS | Mod: S$GLB,,, | Performed by: NURSE PRACTITIONER

## 2022-06-30 PROCEDURE — 3078F PR MOST RECENT DIASTOLIC BLOOD PRESSURE < 80 MM HG: ICD-10-PCS | Mod: CPTII,S$GLB,, | Performed by: NURSE PRACTITIONER

## 2022-06-30 PROCEDURE — 3078F DIAST BP <80 MM HG: CPT | Mod: CPTII,S$GLB,, | Performed by: NURSE PRACTITIONER

## 2022-06-30 PROCEDURE — 85025 COMPLETE CBC W/AUTO DIFF WBC: CPT | Performed by: NURSE PRACTITIONER

## 2022-06-30 PROCEDURE — 3288F PR FALLS RISK ASSESSMENT DOCUMENTED: ICD-10-PCS | Mod: CPTII,S$GLB,, | Performed by: NURSE PRACTITIONER

## 2022-06-30 PROCEDURE — 1159F MED LIST DOCD IN RCRD: CPT | Mod: CPTII,S$GLB,, | Performed by: NURSE PRACTITIONER

## 2022-06-30 PROCEDURE — 1101F PT FALLS ASSESS-DOCD LE1/YR: CPT | Mod: CPTII,S$GLB,, | Performed by: NURSE PRACTITIONER

## 2022-06-30 PROCEDURE — 93010 EKG 12-LEAD: ICD-10-PCS | Mod: S$GLB,,, | Performed by: INTERNAL MEDICINE

## 2022-06-30 PROCEDURE — 3075F PR MOST RECENT SYSTOLIC BLOOD PRESS GE 130-139MM HG: ICD-10-PCS | Mod: CPTII,S$GLB,, | Performed by: NURSE PRACTITIONER

## 2022-06-30 PROCEDURE — 36415 COLL VENOUS BLD VENIPUNCTURE: CPT | Mod: PO | Performed by: NURSE PRACTITIONER

## 2022-06-30 PROCEDURE — 3288F FALL RISK ASSESSMENT DOCD: CPT | Mod: CPTII,S$GLB,, | Performed by: NURSE PRACTITIONER

## 2022-06-30 PROCEDURE — 1126F PR PAIN SEVERITY QUANTIFIED, NO PAIN PRESENT: ICD-10-PCS | Mod: CPTII,S$GLB,, | Performed by: NURSE PRACTITIONER

## 2022-06-30 PROCEDURE — 99214 OFFICE O/P EST MOD 30 MIN: CPT | Mod: S$GLB,,, | Performed by: NURSE PRACTITIONER

## 2022-06-30 PROCEDURE — 1101F PR PT FALLS ASSESS DOC 0-1 FALLS W/OUT INJ PAST YR: ICD-10-PCS | Mod: CPTII,S$GLB,, | Performed by: NURSE PRACTITIONER

## 2022-06-30 PROCEDURE — 1160F PR REVIEW ALL MEDS BY PRESCRIBER/CLIN PHARMACIST DOCUMENTED: ICD-10-PCS | Mod: CPTII,S$GLB,, | Performed by: NURSE PRACTITIONER

## 2022-06-30 PROCEDURE — 1159F PR MEDICATION LIST DOCUMENTED IN MEDICAL RECORD: ICD-10-PCS | Mod: CPTII,S$GLB,, | Performed by: NURSE PRACTITIONER

## 2022-06-30 PROCEDURE — 99999 PR PBB SHADOW E&M-EST. PATIENT-LVL IV: CPT | Mod: PBBFAC,,, | Performed by: NURSE PRACTITIONER

## 2022-06-30 PROCEDURE — 93005 ELECTROCARDIOGRAM TRACING: CPT | Mod: S$GLB,,, | Performed by: NURSE PRACTITIONER

## 2022-06-30 PROCEDURE — 1157F PR ADVANCE CARE PLAN OR EQUIV PRESENT IN MEDICAL RECORD: ICD-10-PCS | Mod: CPTII,S$GLB,, | Performed by: NURSE PRACTITIONER

## 2022-06-30 PROCEDURE — 1126F AMNT PAIN NOTED NONE PRSNT: CPT | Mod: CPTII,S$GLB,, | Performed by: NURSE PRACTITIONER

## 2022-06-30 NOTE — PROGRESS NOTES
Subjective:       Patient ID: Alexander Patel is a 77 y.o. male.    Chief Complaint: Hypotension    Dizziness:   Chronicity:  New  Onset:  1 to 4 weeks ago  Progression since onset:  Waxing and waning  Frequency - weeks/days included: intermittently.  Severity:  Moderate  Duration: 2-3 mins.  Dizziness characteristics:  Off-balance, lightheaded/impending faint and trouble focusing eyes   Associated symptoms: headaches, visual disturbances and light-headedness.no ear pain, no fever, no nausea, no vomiting, no palpitations and no chest pain.   Wife reports he is sleep a lot more than normal  Aggravated by:  Nothing  Treatments tried:  Rest  Improvements on treatment:  Mild   pacemaker placement  Reports episodic BP reading of /46-48    Review of Systems   Constitutional: Positive for fatigue. Negative for fever and unexpected weight change.   HENT: Negative for ear pain and sore throat.    Eyes: Negative.  Negative for pain and visual disturbance.   Respiratory: Negative for cough and shortness of breath.    Cardiovascular: Negative for chest pain and palpitations.   Gastrointestinal: Negative for abdominal pain, diarrhea, nausea and vomiting.   Genitourinary: Negative for dysuria and frequency.   Musculoskeletal: Negative for arthralgias and myalgias.   Skin: Negative for color change and rash.   Neurological: Positive for dizziness, light-headedness and headaches.   Psychiatric/Behavioral: Negative for sleep disturbance. The patient is not nervous/anxious.        Vitals:    06/30/22 0749   BP: 130/62   Pulse: 60   Temp: 97.9 °F (36.6 °C)       Objective:     Current Outpatient Medications   Medication Sig Dispense Refill    allopurinoL (ZYLOPRIM) 100 MG tablet Take 1 tablet (100 mg total) by mouth once daily. 90 tablet 3    aspirin 81 MG chewable tablet Take 81 mg by mouth once daily. Every day      GLUCOSAMINE HCL/CHONDR LAM A NA (OSTEO BI-FLEX ORAL) Take 2 tablets by mouth once daily.        multivit-min/FA/lycopen/lutein (CENTRUM SILVER MEN ORAL) Take 1 tablet by mouth once daily.      tamsulosin (FLOMAX) 0.4 mg Cap TAKE 1 CAPSULE BY MOUTH  EVERY THIRD DAY 90 capsule 3     No current facility-administered medications for this visit.       Physical Exam  Vitals and nursing note reviewed.   Constitutional:       General: He is not in acute distress.     Appearance: He is well-developed.   HENT:      Head: Normocephalic and atraumatic.   Eyes:      Pupils: Pupils are equal, round, and reactive to light.   Cardiovascular:      Rate and Rhythm: Normal rate and regular rhythm.   Pulmonary:      Effort: Pulmonary effort is normal.      Breath sounds: Normal breath sounds.   Musculoskeletal:         General: Normal range of motion.      Cervical back: Normal range of motion and neck supple.   Skin:     General: Skin is warm and dry.      Findings: No rash.   Neurological:      Mental Status: He is alert and oriented to person, place, and time.   Psychiatric:         Thought Content: Thought content normal.         EKG paced at 60 with prolonged AV conduction, no changed from 7/2/2020    Assessment:       1. Hypotension, unspecified hypotension type    2. Episodic lightheadedness    3. Cardiac pacemaker in situ    4. Fatigue, unspecified type    5. Atherosclerosis of aorta    6. Transient visual loss, bilateral         Plan:   Hypotension, unspecified hypotension type  -     EKG 12-lead  -     TSH; Future; Expected date: 06/30/2022  -     CBC Auto Differential; Future; Expected date: 06/30/2022  -     Basic Metabolic Panel; Future; Expected date: 06/30/2022  -     Urinalysis, Reflex to Urine Culture Urine, Clean Catch; Future  -     US Carotid Bilateral; Future; Expected date: 06/30/2022    Episodic lightheadedness  -     EKG 12-lead  -     TSH; Future; Expected date: 06/30/2022  -     CBC Auto Differential; Future; Expected date: 06/30/2022  -     Basic Metabolic Panel; Future; Expected date: 06/30/2022  -      Urinalysis, Reflex to Urine Culture Urine, Clean Catch; Future  -     US Carotid Bilateral; Future; Expected date: 06/30/2022    Cardiac pacemaker in situ  -     TSH; Future; Expected date: 06/30/2022  -     CBC Auto Differential; Future; Expected date: 06/30/2022  -     Basic Metabolic Panel; Future; Expected date: 06/30/2022  -     Urinalysis, Reflex to Urine Culture Urine, Clean Catch; Future  -     US Carotid Bilateral; Future; Expected date: 06/30/2022    Fatigue, unspecified type  -     TSH; Future; Expected date: 06/30/2022  -     CBC Auto Differential; Future; Expected date: 06/30/2022  -     Basic Metabolic Panel; Future; Expected date: 06/30/2022  -     Urinalysis, Reflex to Urine Culture Urine, Clean Catch; Future  -     US Carotid Bilateral; Future; Expected date: 06/30/2022    Atherosclerosis of aorta  -     US Carotid Bilateral; Future; Expected date: 06/30/2022    Transient visual loss, bilateral   -     US Carotid Bilateral; Future; Expected date: 06/30/2022    Appt with cardiology on 7/15/22    No follow-ups on file.    There are no Patient Instructions on file for this visit.

## 2022-07-01 ENCOUNTER — TELEPHONE (OUTPATIENT)
Dept: CARDIOLOGY | Facility: HOSPITAL | Age: 77
End: 2022-07-01
Payer: MEDICARE

## 2022-07-06 ENCOUNTER — HOSPITAL ENCOUNTER (OUTPATIENT)
Dept: RADIOLOGY | Facility: HOSPITAL | Age: 77
Discharge: HOME | End: 2022-07-06
Attending: NURSE PRACTITIONER
Payer: MEDICARE

## 2022-07-06 DIAGNOSIS — Z95.0 CARDIAC PACEMAKER IN SITU: ICD-10-CM

## 2022-07-06 DIAGNOSIS — H53.123 TRANSIENT VISUAL LOSS, BILATERAL: ICD-10-CM

## 2022-07-06 DIAGNOSIS — R53.83 FATIGUE, UNSPECIFIED TYPE: ICD-10-CM

## 2022-07-06 DIAGNOSIS — R42 EPISODIC LIGHTHEADEDNESS: ICD-10-CM

## 2022-07-06 DIAGNOSIS — I95.9 HYPOTENSION, UNSPECIFIED HYPOTENSION TYPE: ICD-10-CM

## 2022-07-06 DIAGNOSIS — I70.0 ATHEROSCLEROSIS OF AORTA: ICD-10-CM

## 2022-07-06 PROCEDURE — 93880 US CAROTID BILATERAL: ICD-10-PCS | Mod: 26,,, | Performed by: RADIOLOGY

## 2022-07-06 PROCEDURE — 93880 EXTRACRANIAL BILAT STUDY: CPT | Mod: 26,,, | Performed by: RADIOLOGY

## 2022-07-06 PROCEDURE — 93880 EXTRACRANIAL BILAT STUDY: CPT | Mod: TC,PO

## 2022-07-08 ENCOUNTER — PATIENT MESSAGE (OUTPATIENT)
Dept: FAMILY MEDICINE | Facility: CLINIC | Age: 77
End: 2022-07-08
Payer: MEDICARE

## 2022-07-11 ENCOUNTER — OFFICE VISIT (OUTPATIENT)
Dept: CARDIOLOGY | Facility: CLINIC | Age: 77
End: 2022-07-11
Payer: MEDICARE

## 2022-07-11 VITALS
SYSTOLIC BLOOD PRESSURE: 142 MMHG | HEART RATE: 61 BPM | WEIGHT: 162.94 LBS | DIASTOLIC BLOOD PRESSURE: 84 MMHG | HEIGHT: 65 IN | BODY MASS INDEX: 27.15 KG/M2

## 2022-07-11 DIAGNOSIS — E78.5 HYPERLIPIDEMIA WITH TARGET LDL LESS THAN 130: ICD-10-CM

## 2022-07-11 DIAGNOSIS — Z95.0 CARDIAC PACEMAKER IN SITU: ICD-10-CM

## 2022-07-11 DIAGNOSIS — I95.1 ORTHOSTATIC HYPOTENSION: ICD-10-CM

## 2022-07-11 DIAGNOSIS — I63.9 CEREBROVASCULAR ACCIDENT (CVA), UNSPECIFIED MECHANISM: ICD-10-CM

## 2022-07-11 DIAGNOSIS — I95.9 HYPOTENSION, UNSPECIFIED HYPOTENSION TYPE: ICD-10-CM

## 2022-07-11 DIAGNOSIS — I10 ESSENTIAL HYPERTENSION: ICD-10-CM

## 2022-07-11 DIAGNOSIS — R06.83 SNORING: Primary | ICD-10-CM

## 2022-07-11 DIAGNOSIS — G47.19 OTHER HYPERSOMNIA: ICD-10-CM

## 2022-07-11 DIAGNOSIS — N18.30 STAGE 3 CHRONIC KIDNEY DISEASE, UNSPECIFIED WHETHER STAGE 3A OR 3B CKD: ICD-10-CM

## 2022-07-11 DIAGNOSIS — I70.0 ATHEROSCLEROSIS OF AORTA: ICD-10-CM

## 2022-07-11 DIAGNOSIS — I48.0 PAF (PAROXYSMAL ATRIAL FIBRILLATION): ICD-10-CM

## 2022-07-11 PROCEDURE — 99999 PR PBB SHADOW E&M-EST. PATIENT-LVL III: CPT | Mod: PBBFAC,,, | Performed by: PHYSICIAN ASSISTANT

## 2022-07-11 PROCEDURE — 99999 PR PBB SHADOW E&M-EST. PATIENT-LVL III: ICD-10-PCS | Mod: PBBFAC,,, | Performed by: PHYSICIAN ASSISTANT

## 2022-07-11 PROCEDURE — 3288F PR FALLS RISK ASSESSMENT DOCUMENTED: ICD-10-PCS | Mod: CPTII,S$GLB,, | Performed by: PHYSICIAN ASSISTANT

## 2022-07-11 PROCEDURE — 3288F FALL RISK ASSESSMENT DOCD: CPT | Mod: CPTII,S$GLB,, | Performed by: PHYSICIAN ASSISTANT

## 2022-07-11 PROCEDURE — 3077F SYST BP >= 140 MM HG: CPT | Mod: CPTII,S$GLB,, | Performed by: PHYSICIAN ASSISTANT

## 2022-07-11 PROCEDURE — 1159F PR MEDICATION LIST DOCUMENTED IN MEDICAL RECORD: ICD-10-PCS | Mod: CPTII,S$GLB,, | Performed by: PHYSICIAN ASSISTANT

## 2022-07-11 PROCEDURE — 3079F PR MOST RECENT DIASTOLIC BLOOD PRESSURE 80-89 MM HG: ICD-10-PCS | Mod: CPTII,S$GLB,, | Performed by: PHYSICIAN ASSISTANT

## 2022-07-11 PROCEDURE — 1101F PT FALLS ASSESS-DOCD LE1/YR: CPT | Mod: CPTII,S$GLB,, | Performed by: PHYSICIAN ASSISTANT

## 2022-07-11 PROCEDURE — 1159F MED LIST DOCD IN RCRD: CPT | Mod: CPTII,S$GLB,, | Performed by: PHYSICIAN ASSISTANT

## 2022-07-11 PROCEDURE — 3079F DIAST BP 80-89 MM HG: CPT | Mod: CPTII,S$GLB,, | Performed by: PHYSICIAN ASSISTANT

## 2022-07-11 PROCEDURE — 99214 PR OFFICE/OUTPT VISIT, EST, LEVL IV, 30-39 MIN: ICD-10-PCS | Mod: S$GLB,,, | Performed by: PHYSICIAN ASSISTANT

## 2022-07-11 PROCEDURE — 99214 OFFICE O/P EST MOD 30 MIN: CPT | Mod: S$GLB,,, | Performed by: PHYSICIAN ASSISTANT

## 2022-07-11 PROCEDURE — 1126F PR PAIN SEVERITY QUANTIFIED, NO PAIN PRESENT: ICD-10-PCS | Mod: CPTII,S$GLB,, | Performed by: PHYSICIAN ASSISTANT

## 2022-07-11 PROCEDURE — 3077F PR MOST RECENT SYSTOLIC BLOOD PRESSURE >= 140 MM HG: ICD-10-PCS | Mod: CPTII,S$GLB,, | Performed by: PHYSICIAN ASSISTANT

## 2022-07-11 PROCEDURE — 99499 UNLISTED E&M SERVICE: CPT | Mod: S$GLB,,, | Performed by: PHYSICIAN ASSISTANT

## 2022-07-11 PROCEDURE — 99499 RISK ADDL DX/OHS AUDIT: ICD-10-PCS | Mod: S$GLB,,, | Performed by: PHYSICIAN ASSISTANT

## 2022-07-11 PROCEDURE — 1157F ADVNC CARE PLAN IN RCRD: CPT | Mod: CPTII,S$GLB,, | Performed by: PHYSICIAN ASSISTANT

## 2022-07-11 PROCEDURE — 1101F PR PT FALLS ASSESS DOC 0-1 FALLS W/OUT INJ PAST YR: ICD-10-PCS | Mod: CPTII,S$GLB,, | Performed by: PHYSICIAN ASSISTANT

## 2022-07-11 PROCEDURE — 1126F AMNT PAIN NOTED NONE PRSNT: CPT | Mod: CPTII,S$GLB,, | Performed by: PHYSICIAN ASSISTANT

## 2022-07-11 PROCEDURE — 1157F PR ADVANCE CARE PLAN OR EQUIV PRESENT IN MEDICAL RECORD: ICD-10-PCS | Mod: CPTII,S$GLB,, | Performed by: PHYSICIAN ASSISTANT

## 2022-07-11 NOTE — PROGRESS NOTES
Subjective:    Patient ID:  Alexander Patel is a 77 y.o. male who presents for follow-up of hypotension.       HPI  Mr. Patel is a very pleasant gentleman who follows with Dr. Meade. He presents today with concerns about low blood pressure. He was having episodes of lightheadedness over the course of several days in late May, and, each time he would check his BP, it would be low (80s-100s systolic). He noted some neck pain when his BP was low. It improved without intervention. He saw his PCP who ordered labs and a carotid US. BMP was c/w mild dehydration. Carotid US was negative. He has had no further episodes of hypotension or lightheadedness since that time. BP is actually slightly higher now (160s systolic).     He does snore and his wife mentions he falls asleep during the day if he sits down briefly, which is unusual for him.     Review of Systems   Constitutional: Negative for chills, diaphoresis, fever, weight gain and weight loss.   HENT: Negative for sore throat.    Eyes: Negative for blurred vision, vision loss in left eye, vision loss in right eye and visual disturbance.   Cardiovascular: Negative for chest pain, claudication, dyspnea on exertion, leg swelling, near-syncope, orthopnea, palpitations, paroxysmal nocturnal dyspnea and syncope.   Respiratory: Positive for snoring. Negative for cough, hemoptysis, shortness of breath, sputum production and wheezing.    Endocrine: Negative for cold intolerance and heat intolerance.   Hematologic/Lymphatic: Negative for adenopathy. Does not bruise/bleed easily.   Skin: Negative for rash.   Musculoskeletal: Negative for falls, muscle weakness and myalgias.   Gastrointestinal: Negative for abdominal pain, change in bowel habit, constipation, diarrhea, melena and nausea.   Genitourinary: Negative for bladder incontinence.   Neurological: Positive for excessive daytime sleepiness and light-headedness (resolved). Negative for dizziness, focal weakness, headaches,  "numbness and weakness.   Psychiatric/Behavioral: Negative for altered mental status.         Vitals:    07/11/22 1251 07/11/22 1328   BP: (!) 162/91 (!) 142/84   BP Location: Right arm    Patient Position: Sitting    BP Method: Medium (Automatic)    Pulse: 61    Weight: 73.9 kg (162 lb 14.7 oz)    Height: 5' 5" (1.651 m)    Body mass index is 27.11 kg/m².    Objective:    Physical Exam  Constitutional:       Appearance: He is well-developed.   HENT:      Head: Normocephalic and atraumatic.   Eyes:      Extraocular Movements: Extraocular movements intact.      Pupils: Pupils are equal, round, and reactive to light.   Neck:      Thyroid: No thyromegaly.      Vascular: No JVD.      Trachea: No tracheal deviation.   Cardiovascular:      Rate and Rhythm: Normal rate and regular rhythm.      Chest Wall: PMI is not displaced.      Pulses: Normal pulses and intact distal pulses.      Heart sounds: S1 normal and S2 normal. No murmur heard.    No friction rub. No gallop.   Pulmonary:      Effort: Pulmonary effort is normal. No respiratory distress.      Breath sounds: Normal breath sounds. No wheezing or rales.   Chest:      Chest wall: No tenderness.   Abdominal:      General: Bowel sounds are normal. There is no distension.      Palpations: Abdomen is soft. There is no mass.      Tenderness: There is no abdominal tenderness.   Musculoskeletal:         General: No tenderness. Normal range of motion.      Cervical back: Neck supple.   Skin:     General: Skin is warm and dry.      Findings: No rash.   Neurological:      General: No focal deficit present.      Mental Status: He is alert and oriented to person, place, and time.   Psychiatric:         Mood and Affect: Mood normal.         Behavior: Behavior normal.           Assessment:       Problem List Items Addressed This Visit        Cardiology Problems    Atherosclerosis of aorta    Essential hypertension    Hyperlipidemia with target LDL less than 130    Orthostatic " hypotension    PAF (paroxysmal atrial fibrillation)    Stroke       Other    Cardiac pacemaker in situ    CKD (chronic kidney disease) stage 3, GFR 30-59 ml/min      Other Visit Diagnoses     Snoring    -  Primary    Relevant Orders    Echo    Home Sleep Studies    Hypotension, unspecified hypotension type        Relevant Orders    Echo    Home Sleep Studies    Other hypersomnia         Relevant Orders    Home Sleep Studies           Plan:       Home Sleep Study to evaluate for THERESE.   Echo to evaluate for structural heart disease.   Continue to monitor BP.   Continue current cardiac medications.   Risk factor modification including diet and exercise.   F/U with Dr. Meade as scheduled.

## 2022-07-18 ENCOUNTER — IMMUNIZATION (OUTPATIENT)
Dept: FAMILY MEDICINE | Facility: CLINIC | Age: 77
End: 2022-07-18
Payer: MEDICARE

## 2022-07-18 DIAGNOSIS — Z23 NEED FOR VACCINATION: Primary | ICD-10-CM

## 2022-07-18 PROCEDURE — 91305 COVID-19, MRNA, LNP-S, PF, 30 MCG/0.3 ML DOSE VACCINE (PFIZER): CPT | Mod: PBBFAC | Performed by: RADIOLOGY

## 2022-08-03 ENCOUNTER — CLINICAL SUPPORT (OUTPATIENT)
Dept: CARDIOLOGY | Facility: HOSPITAL | Age: 77
End: 2022-08-03
Attending: PHYSICIAN ASSISTANT
Payer: MEDICARE

## 2022-08-03 VITALS — HEIGHT: 65 IN | BODY MASS INDEX: 26.99 KG/M2 | WEIGHT: 162 LBS

## 2022-08-03 DIAGNOSIS — I95.9 HYPOTENSION, UNSPECIFIED HYPOTENSION TYPE: ICD-10-CM

## 2022-08-03 DIAGNOSIS — R06.83 SNORING: ICD-10-CM

## 2022-08-03 PROCEDURE — 93306 ECHO (CUPID ONLY): ICD-10-PCS | Mod: 26,,, | Performed by: INTERNAL MEDICINE

## 2022-08-03 PROCEDURE — 93306 TTE W/DOPPLER COMPLETE: CPT | Mod: 26,,, | Performed by: INTERNAL MEDICINE

## 2022-08-03 PROCEDURE — 93306 TTE W/DOPPLER COMPLETE: CPT | Mod: PO

## 2022-08-04 LAB
ASCENDING AORTA: 3.58 CM
AV INDEX (PROSTH): 0.59
AV MEAN GRADIENT: 5 MMHG
AV PEAK GRADIENT: 7 MMHG
AV VALVE AREA: 2.42 CM2
AV VELOCITY RATIO: 0.61
BSA FOR ECHO PROCEDURE: 1.84 M2
CV ECHO LV RWT: 0.51 CM
DOP CALC AO PEAK VEL: 1.35 M/S
DOP CALC AO VTI: 33.43 CM
DOP CALC LVOT AREA: 4.1 CM2
DOP CALC LVOT DIAMETER: 2.29 CM
DOP CALC LVOT PEAK VEL: 0.82 M/S
DOP CALC LVOT STROKE VOLUME: 80.89 CM3
DOP CALCLVOT PEAK VEL VTI: 19.65 CM
ECHO LV POSTERIOR WALL: 1.06 CM (ref 0.6–1.1)
EJECTION FRACTION: 65 %
FRACTIONAL SHORTENING: 39 % (ref 28–44)
INTERVENTRICULAR SEPTUM: 1.52 CM (ref 0.6–1.1)
LA MAJOR: 4.16 CM
LA MINOR: 4.63 CM
LA WIDTH: 2.95 CM
LEFT ATRIUM SIZE: 3.62 CM
LEFT ATRIUM VOLUME INDEX: 22 ML/M2
LEFT ATRIUM VOLUME: 39.78 CM3
LEFT INTERNAL DIMENSION IN SYSTOLE: 2.55 CM (ref 2.1–4)
LEFT VENTRICLE DIASTOLIC VOLUME INDEX: 42.43 ML/M2
LEFT VENTRICLE DIASTOLIC VOLUME: 76.79 ML
LEFT VENTRICLE MASS INDEX: 108 G/M2
LEFT VENTRICLE SYSTOLIC VOLUME INDEX: 13 ML/M2
LEFT VENTRICLE SYSTOLIC VOLUME: 23.46 ML
LEFT VENTRICULAR INTERNAL DIMENSION IN DIASTOLE: 4.16 CM (ref 3.5–6)
LEFT VENTRICULAR MASS: 195.45 G
PISA MRMAX VEL: 0.05 M/S
PISA TR MAX VEL: 2.57 M/S
RA MAJOR: 4.4 CM
RA PRESSURE: 3 MMHG
RA WIDTH: 2.92 CM
RIGHT VENTRICULAR END-DIASTOLIC DIMENSION: 4.29 CM
RV TISSUE DOPPLER FREE WALL SYSTOLIC VELOCITY 1 (APICAL 4 CHAMBER VIEW): 12.71 CM/S
SINUS: 3.94 CM
STJ: 3.15 CM
TR MAX PG: 26 MMHG
TRICUSPID ANNULAR PLANE SYSTOLIC EXCURSION: 2.22 CM
TV REST PULMONARY ARTERY PRESSURE: 29 MMHG

## 2022-08-24 ENCOUNTER — CLINICAL SUPPORT (OUTPATIENT)
Dept: CARDIOLOGY | Facility: HOSPITAL | Age: 77
End: 2022-08-24
Payer: MEDICARE

## 2022-08-24 DIAGNOSIS — Z95.0 PRESENCE OF CARDIAC PACEMAKER: ICD-10-CM

## 2022-08-24 PROCEDURE — 93296 REM INTERROG EVL PM/IDS: CPT | Mod: PO | Performed by: INTERNAL MEDICINE

## 2022-09-26 ENCOUNTER — PATIENT MESSAGE (OUTPATIENT)
Dept: FAMILY MEDICINE | Facility: CLINIC | Age: 77
End: 2022-09-26
Payer: MEDICARE

## 2022-09-28 ENCOUNTER — OFFICE VISIT (OUTPATIENT)
Dept: CARDIOLOGY | Facility: CLINIC | Age: 77
End: 2022-09-28
Payer: MEDICARE

## 2022-09-28 ENCOUNTER — CLINICAL SUPPORT (OUTPATIENT)
Dept: CARDIOLOGY | Facility: HOSPITAL | Age: 77
End: 2022-09-28
Attending: INTERNAL MEDICINE
Payer: MEDICARE

## 2022-09-28 VITALS
HEIGHT: 65 IN | SYSTOLIC BLOOD PRESSURE: 147 MMHG | WEIGHT: 163.13 LBS | HEART RATE: 60 BPM | DIASTOLIC BLOOD PRESSURE: 86 MMHG | BODY MASS INDEX: 27.18 KG/M2

## 2022-09-28 DIAGNOSIS — I48.0 PAF (PAROXYSMAL ATRIAL FIBRILLATION): Primary | ICD-10-CM

## 2022-09-28 DIAGNOSIS — E78.5 HYPERLIPIDEMIA WITH TARGET LDL LESS THAN 130: ICD-10-CM

## 2022-09-28 DIAGNOSIS — I48.0 PAF (PAROXYSMAL ATRIAL FIBRILLATION): ICD-10-CM

## 2022-09-28 DIAGNOSIS — Z95.0 PRESENCE OF CARDIAC PACEMAKER: ICD-10-CM

## 2022-09-28 DIAGNOSIS — I10 ESSENTIAL HYPERTENSION: ICD-10-CM

## 2022-09-28 DIAGNOSIS — R00.1 SINUS BRADYCARDIA, CHRONIC: ICD-10-CM

## 2022-09-28 PROCEDURE — 3077F SYST BP >= 140 MM HG: CPT | Mod: CPTII,S$GLB,, | Performed by: INTERNAL MEDICINE

## 2022-09-28 PROCEDURE — 99499 UNLISTED E&M SERVICE: CPT | Mod: S$GLB,,, | Performed by: INTERNAL MEDICINE

## 2022-09-28 PROCEDURE — 99214 OFFICE O/P EST MOD 30 MIN: CPT | Mod: S$GLB,,, | Performed by: INTERNAL MEDICINE

## 2022-09-28 PROCEDURE — 3079F DIAST BP 80-89 MM HG: CPT | Mod: CPTII,S$GLB,, | Performed by: INTERNAL MEDICINE

## 2022-09-28 PROCEDURE — 1157F ADVNC CARE PLAN IN RCRD: CPT | Mod: CPTII,S$GLB,, | Performed by: INTERNAL MEDICINE

## 2022-09-28 PROCEDURE — 99499 RISK ADDL DX/OHS AUDIT: ICD-10-PCS | Mod: S$GLB,,, | Performed by: INTERNAL MEDICINE

## 2022-09-28 PROCEDURE — 93280 PM DEVICE PROGR EVAL DUAL: CPT | Mod: 26,,, | Performed by: INTERNAL MEDICINE

## 2022-09-28 PROCEDURE — 1159F MED LIST DOCD IN RCRD: CPT | Mod: CPTII,S$GLB,, | Performed by: INTERNAL MEDICINE

## 2022-09-28 PROCEDURE — 3077F PR MOST RECENT SYSTOLIC BLOOD PRESSURE >= 140 MM HG: ICD-10-PCS | Mod: CPTII,S$GLB,, | Performed by: INTERNAL MEDICINE

## 2022-09-28 PROCEDURE — 1126F PR PAIN SEVERITY QUANTIFIED, NO PAIN PRESENT: ICD-10-PCS | Mod: CPTII,S$GLB,, | Performed by: INTERNAL MEDICINE

## 2022-09-28 PROCEDURE — 1159F PR MEDICATION LIST DOCUMENTED IN MEDICAL RECORD: ICD-10-PCS | Mod: CPTII,S$GLB,, | Performed by: INTERNAL MEDICINE

## 2022-09-28 PROCEDURE — 1160F RVW MEDS BY RX/DR IN RCRD: CPT | Mod: CPTII,S$GLB,, | Performed by: INTERNAL MEDICINE

## 2022-09-28 PROCEDURE — 1160F PR REVIEW ALL MEDS BY PRESCRIBER/CLIN PHARMACIST DOCUMENTED: ICD-10-PCS | Mod: CPTII,S$GLB,, | Performed by: INTERNAL MEDICINE

## 2022-09-28 PROCEDURE — 3079F PR MOST RECENT DIASTOLIC BLOOD PRESSURE 80-89 MM HG: ICD-10-PCS | Mod: CPTII,S$GLB,, | Performed by: INTERNAL MEDICINE

## 2022-09-28 PROCEDURE — 99999 PR PBB SHADOW E&M-EST. PATIENT-LVL III: CPT | Mod: PBBFAC,,, | Performed by: INTERNAL MEDICINE

## 2022-09-28 PROCEDURE — 93280 CARDIAC DEVICE CHECK - IN CLINIC & HOSPITAL: ICD-10-PCS | Mod: 26,,, | Performed by: INTERNAL MEDICINE

## 2022-09-28 PROCEDURE — 1126F AMNT PAIN NOTED NONE PRSNT: CPT | Mod: CPTII,S$GLB,, | Performed by: INTERNAL MEDICINE

## 2022-09-28 PROCEDURE — 1157F PR ADVANCE CARE PLAN OR EQUIV PRESENT IN MEDICAL RECORD: ICD-10-PCS | Mod: CPTII,S$GLB,, | Performed by: INTERNAL MEDICINE

## 2022-09-28 PROCEDURE — 99999 PR PBB SHADOW E&M-EST. PATIENT-LVL III: ICD-10-PCS | Mod: PBBFAC,,, | Performed by: INTERNAL MEDICINE

## 2022-09-28 PROCEDURE — 99214 PR OFFICE/OUTPT VISIT, EST, LEVL IV, 30-39 MIN: ICD-10-PCS | Mod: S$GLB,,, | Performed by: INTERNAL MEDICINE

## 2022-09-28 NOTE — PROGRESS NOTES
Subjective:    Patient ID:  Alexander Patel is a 77 y.o. male who presents for follow-up of PAF (2  month f/u )      HPI  She comes with no complaints, no chest pain, no shortness of breath  FC II    Review of Systems   Constitutional: Negative for decreased appetite, malaise/fatigue, weight gain and weight loss.   Cardiovascular:  Negative for chest pain, dyspnea on exertion, leg swelling, palpitations and syncope.   Respiratory:  Negative for cough and shortness of breath.    Gastrointestinal: Negative.    Neurological:  Negative for weakness.   All other systems reviewed and are negative.     Objective:      Physical Exam  Vitals and nursing note reviewed.   Constitutional:       Appearance: Normal appearance. He is well-developed.   HENT:      Head: Normocephalic.   Eyes:      Pupils: Pupils are equal, round, and reactive to light.   Neck:      Thyroid: No thyromegaly.      Vascular: No carotid bruit or JVD.   Cardiovascular:      Rate and Rhythm: Normal rate and regular rhythm.      Chest Wall: PMI is not displaced.      Pulses: Normal pulses and intact distal pulses.      Heart sounds: Normal heart sounds. No murmur heard.    No gallop.   Pulmonary:      Effort: Pulmonary effort is normal.      Breath sounds: Normal breath sounds.   Abdominal:      Palpations: Abdomen is soft. There is no mass.      Tenderness: There is no abdominal tenderness.   Musculoskeletal:         General: Normal range of motion.      Cervical back: Normal range of motion and neck supple.   Skin:     General: Skin is warm.   Neurological:      Mental Status: He is alert and oriented to person, place, and time.      Sensory: No sensory deficit.      Deep Tendon Reflexes: Reflexes are normal and symmetric.     Pacemaker interrogation reveals no significant arrhythmias over the last couple of months      Assessment:       1. PAF (paroxysmal atrial fibrillation)    2. Sinus bradycardia, chronic    3. Hyperlipidemia with target LDL less than  130    4. Essential hypertension         Plan:     Continue all cardiac medications  Regular exercise program  Weight loss  9 month follow-up with Caroline

## 2022-09-30 ENCOUNTER — PES CALL (OUTPATIENT)
Dept: ADMINISTRATIVE | Facility: CLINIC | Age: 77
End: 2022-09-30
Payer: MEDICARE

## 2022-11-09 ENCOUNTER — PES CALL (OUTPATIENT)
Dept: ADMINISTRATIVE | Facility: CLINIC | Age: 77
End: 2022-11-09
Payer: MEDICARE

## 2022-11-22 ENCOUNTER — CLINICAL SUPPORT (OUTPATIENT)
Dept: CARDIOLOGY | Facility: HOSPITAL | Age: 77
End: 2022-11-22
Payer: MEDICARE

## 2022-11-22 DIAGNOSIS — Z95.0 PRESENCE OF CARDIAC PACEMAKER: ICD-10-CM

## 2022-11-22 PROCEDURE — 93296 REM INTERROG EVL PM/IDS: CPT | Mod: PO | Performed by: INTERNAL MEDICINE

## 2022-11-22 PROCEDURE — 93294 CARDIAC DEVICE CHECK - REMOTE: ICD-10-PCS | Mod: ,,, | Performed by: INTERNAL MEDICINE

## 2022-11-22 PROCEDURE — 93294 REM INTERROG EVL PM/LDLS PM: CPT | Mod: ,,, | Performed by: INTERNAL MEDICINE

## 2022-12-23 ENCOUNTER — PATIENT MESSAGE (OUTPATIENT)
Dept: FAMILY MEDICINE | Facility: CLINIC | Age: 77
End: 2022-12-23
Payer: MEDICARE

## 2023-01-17 ENCOUNTER — TELEPHONE (OUTPATIENT)
Dept: FAMILY MEDICINE | Facility: CLINIC | Age: 78
End: 2023-01-17
Payer: MEDICARE

## 2023-01-17 ENCOUNTER — PATIENT MESSAGE (OUTPATIENT)
Dept: FAMILY MEDICINE | Facility: CLINIC | Age: 78
End: 2023-01-17
Payer: MEDICARE

## 2023-01-23 ENCOUNTER — PATIENT OUTREACH (OUTPATIENT)
Dept: ADMINISTRATIVE | Facility: HOSPITAL | Age: 78
End: 2023-01-23
Payer: MEDICARE

## 2023-02-16 ENCOUNTER — OFFICE VISIT (OUTPATIENT)
Dept: FAMILY MEDICINE | Facility: CLINIC | Age: 78
End: 2023-02-16
Payer: MEDICARE

## 2023-02-16 VITALS
DIASTOLIC BLOOD PRESSURE: 78 MMHG | HEART RATE: 60 BPM | WEIGHT: 163.69 LBS | SYSTOLIC BLOOD PRESSURE: 136 MMHG | BODY MASS INDEX: 27.27 KG/M2 | OXYGEN SATURATION: 97 % | TEMPERATURE: 97 F | HEIGHT: 65 IN

## 2023-02-16 DIAGNOSIS — I95.1 ORTHOSTATIC HYPOTENSION: ICD-10-CM

## 2023-02-16 DIAGNOSIS — Z86.73 HISTORY OF STROKE: ICD-10-CM

## 2023-02-16 DIAGNOSIS — I10 ESSENTIAL HYPERTENSION: ICD-10-CM

## 2023-02-16 DIAGNOSIS — N40.0 BENIGN PROSTATIC HYPERPLASIA, UNSPECIFIED WHETHER LOWER URINARY TRACT SYMPTOMS PRESENT: ICD-10-CM

## 2023-02-16 DIAGNOSIS — I70.0 ATHEROSCLEROSIS OF AORTA: ICD-10-CM

## 2023-02-16 DIAGNOSIS — I48.0 PAF (PAROXYSMAL ATRIAL FIBRILLATION): ICD-10-CM

## 2023-02-16 DIAGNOSIS — D64.9 MILD CHRONIC ANEMIA: ICD-10-CM

## 2023-02-16 DIAGNOSIS — I44.1 SECOND DEGREE HEART BLOCK: ICD-10-CM

## 2023-02-16 DIAGNOSIS — G47.33 MILD OBSTRUCTIVE SLEEP APNEA: ICD-10-CM

## 2023-02-16 DIAGNOSIS — Z95.0 CARDIAC PACEMAKER IN SITU: ICD-10-CM

## 2023-02-16 DIAGNOSIS — M10.9 GOUT, UNSPECIFIED CAUSE, UNSPECIFIED CHRONICITY, UNSPECIFIED SITE: ICD-10-CM

## 2023-02-16 DIAGNOSIS — N18.30 STAGE 3 CHRONIC KIDNEY DISEASE, UNSPECIFIED WHETHER STAGE 3A OR 3B CKD: Primary | ICD-10-CM

## 2023-02-16 DIAGNOSIS — Z79.899 OTHER LONG TERM (CURRENT) DRUG THERAPY: ICD-10-CM

## 2023-02-16 DIAGNOSIS — R41.89 SUBJECTIVE MEMORY COMPLAINTS: ICD-10-CM

## 2023-02-16 PROBLEM — Z12.11 COLON CANCER SCREENING: Status: RESOLVED | Noted: 2021-02-25 | Resolved: 2023-02-16

## 2023-02-16 PROBLEM — R94.4 DECREASED GFR: Status: RESOLVED | Noted: 2017-07-19 | Resolved: 2023-02-16

## 2023-02-16 PROCEDURE — 3078F PR MOST RECENT DIASTOLIC BLOOD PRESSURE < 80 MM HG: ICD-10-PCS | Mod: CPTII,S$GLB,, | Performed by: FAMILY MEDICINE

## 2023-02-16 PROCEDURE — 3078F DIAST BP <80 MM HG: CPT | Mod: CPTII,S$GLB,, | Performed by: FAMILY MEDICINE

## 2023-02-16 PROCEDURE — 3075F SYST BP GE 130 - 139MM HG: CPT | Mod: CPTII,S$GLB,, | Performed by: FAMILY MEDICINE

## 2023-02-16 PROCEDURE — 1159F PR MEDICATION LIST DOCUMENTED IN MEDICAL RECORD: ICD-10-PCS | Mod: CPTII,S$GLB,, | Performed by: FAMILY MEDICINE

## 2023-02-16 PROCEDURE — 99215 OFFICE O/P EST HI 40 MIN: CPT | Mod: S$GLB,,, | Performed by: FAMILY MEDICINE

## 2023-02-16 PROCEDURE — 99215 PR OFFICE/OUTPT VISIT, EST, LEVL V, 40-54 MIN: ICD-10-PCS | Mod: S$GLB,,, | Performed by: FAMILY MEDICINE

## 2023-02-16 PROCEDURE — 3075F PR MOST RECENT SYSTOLIC BLOOD PRESS GE 130-139MM HG: ICD-10-PCS | Mod: CPTII,S$GLB,, | Performed by: FAMILY MEDICINE

## 2023-02-16 PROCEDURE — 1101F PT FALLS ASSESS-DOCD LE1/YR: CPT | Mod: CPTII,S$GLB,, | Performed by: FAMILY MEDICINE

## 2023-02-16 PROCEDURE — 99999 PR PBB SHADOW E&M-EST. PATIENT-LVL III: CPT | Mod: PBBFAC,,, | Performed by: FAMILY MEDICINE

## 2023-02-16 PROCEDURE — 99999 PR PBB SHADOW E&M-EST. PATIENT-LVL III: ICD-10-PCS | Mod: PBBFAC,,, | Performed by: FAMILY MEDICINE

## 2023-02-16 PROCEDURE — 1101F PR PT FALLS ASSESS DOC 0-1 FALLS W/OUT INJ PAST YR: ICD-10-PCS | Mod: CPTII,S$GLB,, | Performed by: FAMILY MEDICINE

## 2023-02-16 PROCEDURE — 3288F PR FALLS RISK ASSESSMENT DOCUMENTED: ICD-10-PCS | Mod: CPTII,S$GLB,, | Performed by: FAMILY MEDICINE

## 2023-02-16 PROCEDURE — 3288F FALL RISK ASSESSMENT DOCD: CPT | Mod: CPTII,S$GLB,, | Performed by: FAMILY MEDICINE

## 2023-02-16 PROCEDURE — 1159F MED LIST DOCD IN RCRD: CPT | Mod: CPTII,S$GLB,, | Performed by: FAMILY MEDICINE

## 2023-02-16 PROCEDURE — 1157F ADVNC CARE PLAN IN RCRD: CPT | Mod: CPTII,S$GLB,, | Performed by: FAMILY MEDICINE

## 2023-02-16 PROCEDURE — 1157F PR ADVANCE CARE PLAN OR EQUIV PRESENT IN MEDICAL RECORD: ICD-10-PCS | Mod: CPTII,S$GLB,, | Performed by: FAMILY MEDICINE

## 2023-02-17 ENCOUNTER — LAB VISIT (OUTPATIENT)
Dept: LAB | Facility: HOSPITAL | Age: 78
End: 2023-02-17
Attending: FAMILY MEDICINE
Payer: MEDICARE

## 2023-02-17 DIAGNOSIS — I44.1 SECOND DEGREE HEART BLOCK: ICD-10-CM

## 2023-02-17 DIAGNOSIS — I10 ESSENTIAL HYPERTENSION: ICD-10-CM

## 2023-02-17 DIAGNOSIS — N18.30 STAGE 3 CHRONIC KIDNEY DISEASE, UNSPECIFIED WHETHER STAGE 3A OR 3B CKD: ICD-10-CM

## 2023-02-17 DIAGNOSIS — Z95.0 CARDIAC PACEMAKER IN SITU: ICD-10-CM

## 2023-02-17 DIAGNOSIS — N40.0 BENIGN PROSTATIC HYPERPLASIA, UNSPECIFIED WHETHER LOWER URINARY TRACT SYMPTOMS PRESENT: ICD-10-CM

## 2023-02-17 DIAGNOSIS — M10.9 GOUT, UNSPECIFIED CAUSE, UNSPECIFIED CHRONICITY, UNSPECIFIED SITE: ICD-10-CM

## 2023-02-17 DIAGNOSIS — I48.0 PAF (PAROXYSMAL ATRIAL FIBRILLATION): ICD-10-CM

## 2023-02-17 DIAGNOSIS — D64.9 MILD CHRONIC ANEMIA: ICD-10-CM

## 2023-02-17 DIAGNOSIS — G47.33 MILD OBSTRUCTIVE SLEEP APNEA: ICD-10-CM

## 2023-02-17 DIAGNOSIS — Z86.73 HISTORY OF STROKE: ICD-10-CM

## 2023-02-17 DIAGNOSIS — I70.0 ATHEROSCLEROSIS OF AORTA: ICD-10-CM

## 2023-02-17 DIAGNOSIS — Z79.899 OTHER LONG TERM (CURRENT) DRUG THERAPY: ICD-10-CM

## 2023-02-17 DIAGNOSIS — R41.89 SUBJECTIVE MEMORY COMPLAINTS: ICD-10-CM

## 2023-02-17 DIAGNOSIS — I95.1 ORTHOSTATIC HYPOTENSION: ICD-10-CM

## 2023-02-17 LAB
ALBUMIN SERPL BCP-MCNC: 4.1 G/DL (ref 3.5–5.2)
ALP SERPL-CCNC: 73 U/L (ref 55–135)
ALT SERPL W/O P-5'-P-CCNC: 14 U/L (ref 10–44)
ANION GAP SERPL CALC-SCNC: 7 MMOL/L (ref 8–16)
AST SERPL-CCNC: 22 U/L (ref 10–40)
BASOPHILS # BLD AUTO: 0.04 K/UL (ref 0–0.2)
BASOPHILS NFR BLD: 0.9 % (ref 0–1.9)
BILIRUB SERPL-MCNC: 0.5 MG/DL (ref 0.1–1)
BUN SERPL-MCNC: 22 MG/DL (ref 8–23)
CALCIUM SERPL-MCNC: 9.3 MG/DL (ref 8.7–10.5)
CHLORIDE SERPL-SCNC: 109 MMOL/L (ref 95–110)
CHOLEST SERPL-MCNC: 200 MG/DL (ref 120–199)
CHOLEST/HDLC SERPL: 4 {RATIO} (ref 2–5)
CO2 SERPL-SCNC: 25 MMOL/L (ref 23–29)
CREAT SERPL-MCNC: 1.6 MG/DL (ref 0.5–1.4)
DIFFERENTIAL METHOD: ABNORMAL
EOSINOPHIL # BLD AUTO: 0.1 K/UL (ref 0–0.5)
EOSINOPHIL NFR BLD: 2.8 % (ref 0–8)
ERYTHROCYTE [DISTWIDTH] IN BLOOD BY AUTOMATED COUNT: 13.1 % (ref 11.5–14.5)
ERYTHROCYTE [SEDIMENTATION RATE] IN BLOOD BY WESTERGREN METHOD: 10 MM/HR (ref 0–10)
EST. GFR  (NO RACE VARIABLE): 43.6 ML/MIN/1.73 M^2
FOLATE SERPL-MCNC: 15.4 NG/ML (ref 4–24)
GLUCOSE SERPL-MCNC: 93 MG/DL (ref 70–110)
HCT VFR BLD AUTO: 36 % (ref 40–54)
HDLC SERPL-MCNC: 50 MG/DL (ref 40–75)
HDLC SERPL: 25 % (ref 20–50)
HGB BLD-MCNC: 11.8 G/DL (ref 14–18)
IMM GRANULOCYTES # BLD AUTO: 0.01 K/UL (ref 0–0.04)
IMM GRANULOCYTES NFR BLD AUTO: 0.2 % (ref 0–0.5)
LDLC SERPL CALC-MCNC: 134 MG/DL (ref 63–159)
LYMPHOCYTES # BLD AUTO: 1.2 K/UL (ref 1–4.8)
LYMPHOCYTES NFR BLD: 26.7 % (ref 18–48)
MCH RBC QN AUTO: 30.1 PG (ref 27–31)
MCHC RBC AUTO-ENTMCNC: 32.8 G/DL (ref 32–36)
MCV RBC AUTO: 92 FL (ref 82–98)
MONOCYTES # BLD AUTO: 0.5 K/UL (ref 0.3–1)
MONOCYTES NFR BLD: 10.2 % (ref 4–15)
NEUTROPHILS # BLD AUTO: 2.7 K/UL (ref 1.8–7.7)
NEUTROPHILS NFR BLD: 59.2 % (ref 38–73)
NONHDLC SERPL-MCNC: 150 MG/DL
NRBC BLD-RTO: 0 /100 WBC
PLATELET # BLD AUTO: 198 K/UL (ref 150–450)
PMV BLD AUTO: 10.9 FL (ref 9.2–12.9)
POTASSIUM SERPL-SCNC: 4.7 MMOL/L (ref 3.5–5.1)
PROT SERPL-MCNC: 6.6 G/DL (ref 6–8.4)
RBC # BLD AUTO: 3.92 M/UL (ref 4.6–6.2)
SODIUM SERPL-SCNC: 141 MMOL/L (ref 136–145)
TRIGL SERPL-MCNC: 80 MG/DL (ref 30–150)
TSH SERPL DL<=0.005 MIU/L-ACNC: 1.27 UIU/ML (ref 0.4–4)
URATE SERPL-MCNC: 7.3 MG/DL (ref 3.4–7)
VIT B12 SERPL-MCNC: 285 PG/ML (ref 210–950)
WBC # BLD AUTO: 4.6 K/UL (ref 3.9–12.7)

## 2023-02-17 PROCEDURE — 85651 RBC SED RATE NONAUTOMATED: CPT | Mod: PO | Performed by: FAMILY MEDICINE

## 2023-02-17 PROCEDURE — 36415 COLL VENOUS BLD VENIPUNCTURE: CPT | Mod: PO | Performed by: FAMILY MEDICINE

## 2023-02-17 PROCEDURE — 82746 ASSAY OF FOLIC ACID SERUM: CPT | Performed by: FAMILY MEDICINE

## 2023-02-17 PROCEDURE — 85025 COMPLETE CBC W/AUTO DIFF WBC: CPT | Performed by: FAMILY MEDICINE

## 2023-02-17 PROCEDURE — 84443 ASSAY THYROID STIM HORMONE: CPT | Performed by: FAMILY MEDICINE

## 2023-02-17 PROCEDURE — 80053 COMPREHEN METABOLIC PANEL: CPT | Performed by: FAMILY MEDICINE

## 2023-02-17 PROCEDURE — 84550 ASSAY OF BLOOD/URIC ACID: CPT | Performed by: FAMILY MEDICINE

## 2023-02-17 PROCEDURE — 82607 VITAMIN B-12: CPT | Performed by: FAMILY MEDICINE

## 2023-02-17 PROCEDURE — 80061 LIPID PANEL: CPT | Performed by: FAMILY MEDICINE

## 2023-02-17 NOTE — PROGRESS NOTES
Came in to establish care.  Multiple medical problems as below.  Chronic kidney disease stage 3 asymptomatic.  Mild obstructive sleep apnea did not tolerate CPAP.  Not having a lot of symptoms currently.  He has had issues with hypertension and orthostatic hypotension.  Not currently on antihypertensive therapy.  He does have pacemaker due to heart block.  Previously had episode of atrial fibrillation noted on pacemaker interrogation, but has not had known recurrence or symptoms.  He is not on anticoagulation.  Followed by Cardiology.  Asymptomatic atherosclerosis of the aorta.  Previous incidental finding of small stroke on CT.  Was not symptomatic.  He does have a history of gout without recent attacks.  He is on low-dose allopurinol.  Last uric acid level was elevated.  He does have a chronic mild anemia present for many years asymptomatic.  No evidence of bleeding.  He does have some memory concerns.  Occasionally forgetful.  Not as confident in his driving.  Though complain of accidents.  Still does woodworking without issue.    Alexander was seen today for establish care.    Diagnoses and all orders for this visit:    Stage 3 chronic kidney disease, unspecified whether stage 3a or 3b CKD  -     Comprehensive Metabolic Panel; Future  -     CBC Auto Differential; Future  -     Lipid Panel; Future  -     Uric Acid; Future  -     Folate; Future  -     Vitamin B12; Future    Mild obstructive sleep apnea  -     Folate; Future  -     Vitamin B12; Future    Orthostatic hypotension  -     Folate; Future  -     Vitamin B12; Future    Essential hypertension  -     Lipid Panel; Future  -     Folate; Future  -     Vitamin B12; Future    Atherosclerosis of aorta  -     Folate; Future  -     Vitamin B12; Future    Cardiac pacemaker in situ  -     Folate; Future  -     Vitamin B12; Future    PAF (paroxysmal atrial fibrillation)  -     Folate; Future  -     Vitamin B12; Future    Second degree heart block  -     Folate;  Future  -     Vitamin B12; Future    Gout, unspecified cause, unspecified chronicity, unspecified site  -     Uric Acid; Future  -     Folate; Future  -     Vitamin B12; Future    History of stroke  -     Lipid Panel; Future  -     Folate; Future  -     Vitamin B12; Future    Benign prostatic hyperplasia, unspecified whether lower urinary tract symptoms present  -     Folate; Future  -     Vitamin B12; Future    Subjective memory complaints  -     Sedimentation rate; Future  -     Folate; Future  -     Vitamin B12; Future  -     TSH; Future    Mild chronic anemia  -     Sedimentation rate; Future  -     Folate; Future  -     Vitamin B12; Future  -     TSH; Future    Other long term (current) drug therapy  -     Folate; Future  -     Vitamin B12; Future      Continue current medications.  Continue follow-up with Cardiology.  Obtain laboratory evaluation as above.  Mild memory complaints without evidence of current active dementia.  With monitor.  We did discuss possible further evaluation with Neurology.  He would like to defer this for now.        48 minutes spent with patient evaluation record review exam and documentation.    Past Medical History:  Past Medical History:   Diagnosis Date    Acute idiopathic gout of multiple sites 02/23/2022    Atherosclerosis of aorta 4/13/2021    He has atherosclerosis noted on imaging in the past and no tx is needed at this time.    BPH (benign prostatic hyperplasia)     Bradycardia 10/8/2015    He has bradycardia and he had to get a pacemaker. This is followed by cardiology now.    Cardiac pacemaker in situ 4/13/2021    CKD (chronic kidney disease) stage 3, GFR 30-59 ml/min 12/25/2017    Alexander Patel has an Estimated Glumerular Filtration Rate (EGFR) between 30 and 59 consistent with the definition of chronic kidney disease stage 3.  eGFR if non   Date Value Ref Range Status  04/23/2018 53.8 (A) >60 mL/min/1.73 m^2 Final    Comment:    Calculation used to  obtain the estimated glomerular filtration rate (eGFR) is the CKD-EPI equation.       Lab Results  Component     Colon polyp 10/8/2015    Diverticulosis of large intestine without hemorrhage 10/8/2015    Essential hypertension 08/28/2015    Gout 2012    Hyperlipidemia LDL goal < 130     Mild obstructive sleep apnea 02/16/2023    Orthostatic hypotension 7/14/2020    PAF (paroxysmal atrial fibrillation) 12/30/2020    He had an episode of afib in the past and he has not required anticoagulation and is not needing rate control.  He has not had stroke symptoms.    Second degree heart block 8/23/2019    Stroke 09/22/2021    Incidental finding right parietal area on a CT of the brain.     Past Surgical History:   Procedure Laterality Date    A-V CARDIAC PACEMAKER INSERTION N/A 8/23/2019    Procedure: INSERTION, CARDIAC PACEMAKER, DUAL CHAMBER;  Surgeon: Manas Meade MD;  Location: [facility] CATH;  Service: Cardiology;  Laterality: N/A;    COLONOSCOPY N/A 10/8/2015    Procedure: COLONOSCOPY;  Surgeon: Avinash Lamb MD;  Location: [facility] ENDO;  Service: Endoscopy;  Laterality: N/A;    COLONOSCOPY N/A 2/25/2021    Procedure: COLONOSCOPY;  Surgeon: Avinash Lamb MD;  Location: [facility] ENDO;  Service: Endoscopy;  Laterality: N/A;    FRACTURE SURGERY      hand     Review of patient's allergies indicates:   Allergen Reactions    No known drug allergies      Current Outpatient Medications on File Prior to Visit   Medication Sig Dispense Refill    allopurinoL (ZYLOPRIM) 100 MG tablet Take 1 tablet (100 mg total) by mouth once daily. 90 tablet 3    aspirin 81 MG chewable tablet Take 81 mg by mouth once daily. Every day      GLUCOSAMINE HCL/CHONDR LAM A NA (OSTEO BI-FLEX ORAL) Take 2 tablets by mouth once daily.       multivit-min/FA/lycopen/lutein (CENTRUM SILVER MEN ORAL) Take 1 tablet by mouth once daily.      tamsulosin (FLOMAX) 0.4 mg Cap TAKE 1 CAPSULE BY MOUTH  EVERY THIRD DAY 90 capsule 3     No current  "facility-administered medications on file prior to visit.     Social History     Socioeconomic History    Marital status:      Spouse name: Modesta    Number of children: 2   Occupational History    Occupation: retired   Tobacco Use    Smoking status: Never    Smokeless tobacco: Former     Types: Chew   Substance and Sexual Activity    Alcohol use: Yes     Comment: occasional    Drug use: No    Sexual activity: Yes     Family History   Problem Relation Age of Onset    Heart disease Mother     Alcohol abuse Father     Emphysema Father            ROS:  GENERAL: No fever, chills,  or significant weight changes.   CARDIOVASCULAR: Denies chest pain, PND, orthopnea or reduced exercise tolerance.  ABDOMEN: Appetite fine. Denies diarrhea, abdominal pain, hematemesis or blood in stool.  URINARY: No flank pain, dysuria or hematuria.    Vitals:    02/16/23 0916 02/16/23 0919   BP: (!) 157/81 136/78   Pulse: 60    Temp: 97.3 °F (36.3 °C)    TempSrc: Temporal    SpO2: 97%    Weight: 74.3 kg (163 lb 11.2 oz)    Height: 5' 5" (1.651 m)      Wt Readings from Last 3 Encounters:   02/16/23 74.3 kg (163 lb 11.2 oz)   09/28/22 74 kg (163 lb 2.3 oz)   08/03/22 73.5 kg (162 lb)       OBJECTIVE:   APPEARANCE: Well nourished, well developed, in no acute distress.    HEAD: Normocephalic.  Atraumatic.  No sinus tenderness.  EYES:   Right eye: Pupil reactive.  Conjunctiva clear.    Left eye: Pupil reactive.  Conjunctiva clear.  EOMI.    EARS: TM's intact. Light reflex normal. No retraction or perforation.    NOSE:  clear.  MOUTH & THROAT:  No pharyngeal erythema or exudate. No lesions.  NECK: Supple. No bruits.  No JVD.  No cervical lymphadenopathy.  No thyromegaly.    CHEST: Breath sounds clear bilaterally.  Normal respiratory effort  CARDIOVASCULAR: Normal rate.  Regular rhythm.  No murmurs.  No rub.  No gallops.  ABDOMEN: Bowel sounds normal.  Soft.  No tenderness.  No organomegaly.  PERIPHERAL VASCULAR: No cyanosis.  No clubbing.  No " edema.  NEUROLOGIC: No focal findings.  MENTAL STATUS: Alert.  Oriented x 3.  Mini-mental status exam 25/30.

## 2023-02-20 ENCOUNTER — CLINICAL SUPPORT (OUTPATIENT)
Dept: CARDIOLOGY | Facility: HOSPITAL | Age: 78
End: 2023-02-20
Payer: MEDICARE

## 2023-02-20 DIAGNOSIS — Z95.0 PRESENCE OF CARDIAC PACEMAKER: ICD-10-CM

## 2023-02-20 PROCEDURE — 93296 REM INTERROG EVL PM/IDS: CPT | Mod: PO | Performed by: INTERNAL MEDICINE

## 2023-02-21 ENCOUNTER — TELEPHONE (OUTPATIENT)
Dept: FAMILY MEDICINE | Facility: CLINIC | Age: 78
End: 2023-02-21
Payer: MEDICARE

## 2023-02-21 DIAGNOSIS — N18.30 STAGE 3 CHRONIC KIDNEY DISEASE, UNSPECIFIED WHETHER STAGE 3A OR 3B CKD: Primary | ICD-10-CM

## 2023-02-21 NOTE — TELEPHONE ENCOUNTER
----- Message from Jai Mcmahon MD sent at 2/20/2023  4:57 PM CST -----  Chronic kidney disease with stable Kidney function compared to previous checks.  Cholesterol higher than I would recommend.  Uric acid above goal as well.  Borderline anemia stable compared to previous checks.  Recommend increase allopurinol 100 mg take 1-1/2 pills daily for total of 150 mg.  Would recheck uric acid level in a month.  I would consider taking cholesterol medication with his previous heart history.  Would suggest Crestor 10 mg daily and recheck lipid, CMP 6 months.. My nurse will contact you to review.  Thanks,  Dr. Mcmahon

## 2023-02-21 NOTE — TELEPHONE ENCOUNTER
----- Message from Angelica Donnelly sent at 2/21/2023  3:41 PM CST -----  Contact: j carlos/ wife  .Type:  Patient Returning Call    Who Called:j carlos/ wife  Who Left Message for Patient:nurse  Does the patient know what this is regarding?:unknown  Would the patient rather a call back or a response via [facility]? Call back   Best Call Back Number:[phone]  Additional Information: patients wife returning call

## 2023-03-10 ENCOUNTER — TELEPHONE (OUTPATIENT)
Dept: CARDIOLOGY | Facility: CLINIC | Age: 78
End: 2023-03-10
Payer: MEDICARE

## 2023-03-14 ENCOUNTER — PES CALL (OUTPATIENT)
Dept: ADMINISTRATIVE | Facility: CLINIC | Age: 78
End: 2023-03-14
Payer: MEDICARE

## 2023-03-17 ENCOUNTER — TELEPHONE (OUTPATIENT)
Dept: CARDIOLOGY | Facility: CLINIC | Age: 78
End: 2023-03-17
Payer: MEDICARE

## 2023-03-17 ENCOUNTER — LAB VISIT (OUTPATIENT)
Dept: LAB | Facility: HOSPITAL | Age: 78
End: 2023-03-17
Attending: FAMILY MEDICINE
Payer: MEDICARE

## 2023-03-17 DIAGNOSIS — N18.30 STAGE 3 CHRONIC KIDNEY DISEASE, UNSPECIFIED WHETHER STAGE 3A OR 3B CKD: ICD-10-CM

## 2023-03-17 LAB — URATE SERPL-MCNC: 6.7 MG/DL (ref 3.4–7)

## 2023-03-17 PROCEDURE — 84550 ASSAY OF BLOOD/URIC ACID: CPT | Performed by: FAMILY MEDICINE

## 2023-03-17 PROCEDURE — 36415 COLL VENOUS BLD VENIPUNCTURE: CPT | Mod: PO | Performed by: FAMILY MEDICINE

## 2023-03-24 ENCOUNTER — TELEPHONE (OUTPATIENT)
Dept: CARDIOLOGY | Facility: CLINIC | Age: 78
End: 2023-03-24
Payer: MEDICARE

## 2023-05-16 ENCOUNTER — PATIENT MESSAGE (OUTPATIENT)
Dept: FAMILY MEDICINE | Facility: CLINIC | Age: 78
End: 2023-05-16
Payer: MEDICARE

## 2023-05-21 ENCOUNTER — CLINICAL SUPPORT (OUTPATIENT)
Dept: CARDIOLOGY | Facility: HOSPITAL | Age: 78
End: 2023-05-21
Payer: MEDICARE

## 2023-05-21 DIAGNOSIS — Z95.0 PRESENCE OF CARDIAC PACEMAKER: ICD-10-CM

## 2023-05-21 PROCEDURE — 93294 CARDIAC DEVICE CHECK - REMOTE: ICD-10-PCS | Mod: ,,, | Performed by: INTERNAL MEDICINE

## 2023-05-21 PROCEDURE — 93294 REM INTERROG EVL PM/LDLS PM: CPT | Mod: ,,, | Performed by: INTERNAL MEDICINE

## 2023-05-21 PROCEDURE — 93296 REM INTERROG EVL PM/IDS: CPT | Mod: PO | Performed by: INTERNAL MEDICINE

## 2023-06-23 ENCOUNTER — CLINICAL SUPPORT (OUTPATIENT)
Dept: CARDIOLOGY | Facility: HOSPITAL | Age: 78
End: 2023-06-23
Attending: INTERNAL MEDICINE
Payer: MEDICARE

## 2023-06-23 ENCOUNTER — OFFICE VISIT (OUTPATIENT)
Dept: CARDIOLOGY | Facility: CLINIC | Age: 78
End: 2023-06-23
Payer: MEDICARE

## 2023-06-23 VITALS
BODY MASS INDEX: 26.14 KG/M2 | HEART RATE: 59 BPM | WEIGHT: 162.69 LBS | SYSTOLIC BLOOD PRESSURE: 152 MMHG | DIASTOLIC BLOOD PRESSURE: 86 MMHG | HEIGHT: 66 IN

## 2023-06-23 DIAGNOSIS — I48.0 PAF (PAROXYSMAL ATRIAL FIBRILLATION): ICD-10-CM

## 2023-06-23 DIAGNOSIS — Z95.0 CARDIAC PACEMAKER IN SITU: Primary | ICD-10-CM

## 2023-06-23 DIAGNOSIS — I95.1 ORTHOSTATIC HYPOTENSION: ICD-10-CM

## 2023-06-23 DIAGNOSIS — E78.5 HYPERLIPIDEMIA WITH TARGET LDL LESS THAN 130: ICD-10-CM

## 2023-06-23 DIAGNOSIS — I44.1 SECOND DEGREE HEART BLOCK: ICD-10-CM

## 2023-06-23 DIAGNOSIS — Z95.0 CARDIAC PACEMAKER IN SITU: ICD-10-CM

## 2023-06-23 DIAGNOSIS — I10 ESSENTIAL HYPERTENSION: ICD-10-CM

## 2023-06-23 DIAGNOSIS — I48.0 PAF (PAROXYSMAL ATRIAL FIBRILLATION): Primary | ICD-10-CM

## 2023-06-23 PROCEDURE — 1126F AMNT PAIN NOTED NONE PRSNT: CPT | Mod: CPTII,S$GLB,, | Performed by: INTERNAL MEDICINE

## 2023-06-23 PROCEDURE — 93280 CARDIAC DEVICE CHECK - IN CLINIC & HOSPITAL: ICD-10-PCS | Mod: 26,,, | Performed by: INTERNAL MEDICINE

## 2023-06-23 PROCEDURE — 3079F DIAST BP 80-89 MM HG: CPT | Mod: CPTII,S$GLB,, | Performed by: INTERNAL MEDICINE

## 2023-06-23 PROCEDURE — 99214 OFFICE O/P EST MOD 30 MIN: CPT | Mod: S$GLB,,, | Performed by: INTERNAL MEDICINE

## 2023-06-23 PROCEDURE — 93280 PM DEVICE PROGR EVAL DUAL: CPT | Mod: 26,,, | Performed by: INTERNAL MEDICINE

## 2023-06-23 PROCEDURE — 99999 PR PBB SHADOW E&M-EST. PATIENT-LVL III: CPT | Mod: PBBFAC,,, | Performed by: INTERNAL MEDICINE

## 2023-06-23 PROCEDURE — 1126F PR PAIN SEVERITY QUANTIFIED, NO PAIN PRESENT: ICD-10-PCS | Mod: CPTII,S$GLB,, | Performed by: INTERNAL MEDICINE

## 2023-06-23 PROCEDURE — 3077F PR MOST RECENT SYSTOLIC BLOOD PRESSURE >= 140 MM HG: ICD-10-PCS | Mod: CPTII,S$GLB,, | Performed by: INTERNAL MEDICINE

## 2023-06-23 PROCEDURE — 1160F RVW MEDS BY RX/DR IN RCRD: CPT | Mod: CPTII,S$GLB,, | Performed by: INTERNAL MEDICINE

## 2023-06-23 PROCEDURE — 1157F ADVNC CARE PLAN IN RCRD: CPT | Mod: CPTII,S$GLB,, | Performed by: INTERNAL MEDICINE

## 2023-06-23 PROCEDURE — 1160F PR REVIEW ALL MEDS BY PRESCRIBER/CLIN PHARMACIST DOCUMENTED: ICD-10-PCS | Mod: CPTII,S$GLB,, | Performed by: INTERNAL MEDICINE

## 2023-06-23 PROCEDURE — 3079F PR MOST RECENT DIASTOLIC BLOOD PRESSURE 80-89 MM HG: ICD-10-PCS | Mod: CPTII,S$GLB,, | Performed by: INTERNAL MEDICINE

## 2023-06-23 PROCEDURE — 99214 PR OFFICE/OUTPT VISIT, EST, LEVL IV, 30-39 MIN: ICD-10-PCS | Mod: S$GLB,,, | Performed by: INTERNAL MEDICINE

## 2023-06-23 PROCEDURE — 1159F MED LIST DOCD IN RCRD: CPT | Mod: CPTII,S$GLB,, | Performed by: INTERNAL MEDICINE

## 2023-06-23 PROCEDURE — 99999 PR PBB SHADOW E&M-EST. PATIENT-LVL III: ICD-10-PCS | Mod: PBBFAC,,, | Performed by: INTERNAL MEDICINE

## 2023-06-23 PROCEDURE — 3077F SYST BP >= 140 MM HG: CPT | Mod: CPTII,S$GLB,, | Performed by: INTERNAL MEDICINE

## 2023-06-23 PROCEDURE — 1159F PR MEDICATION LIST DOCUMENTED IN MEDICAL RECORD: ICD-10-PCS | Mod: CPTII,S$GLB,, | Performed by: INTERNAL MEDICINE

## 2023-06-23 PROCEDURE — 1157F PR ADVANCE CARE PLAN OR EQUIV PRESENT IN MEDICAL RECORD: ICD-10-PCS | Mod: CPTII,S$GLB,, | Performed by: INTERNAL MEDICINE

## 2023-06-23 NOTE — PROGRESS NOTES
Subjective:    Patient ID:  Alexander Patel is a 78 y.o. male who presents for follow-up of PAF      HPI  He comes for follow up with episodes of lightheadedness dizziness and shortness of breath when walking a flight of stairs and after walking for long distance.  No chest pain.  No syncope.  His blood pressure actually has been in the low side we a readings in the 80s a 90 sometimes, not always when he is having these episodes    Review of Systems   Constitutional: Negative for decreased appetite, malaise/fatigue, weight gain and weight loss.   Cardiovascular:  Negative for chest pain, dyspnea on exertion, leg swelling, palpitations and syncope.   Respiratory:  Negative for cough and shortness of breath.    Gastrointestinal: Negative.    Neurological:  Negative for weakness.   All other systems reviewed and are negative.     Objective:      Physical Exam  Vitals and nursing note reviewed.   Constitutional:       Appearance: Normal appearance. He is well-developed.   HENT:      Head: Normocephalic.   Eyes:      Pupils: Pupils are equal, round, and reactive to light.   Neck:      Thyroid: No thyromegaly.      Vascular: No carotid bruit or JVD.   Cardiovascular:      Rate and Rhythm: Normal rate and regular rhythm.      Chest Wall: PMI is not displaced.      Pulses: Normal pulses and intact distal pulses.      Heart sounds: Normal heart sounds. No murmur heard.    No gallop.   Pulmonary:      Effort: Pulmonary effort is normal.      Breath sounds: Normal breath sounds.   Abdominal:      Palpations: Abdomen is soft. There is no mass.      Tenderness: There is no abdominal tenderness.   Musculoskeletal:         General: Normal range of motion.      Cervical back: Normal range of motion and neck supple.   Skin:     General: Skin is warm.   Neurological:      Mental Status: He is alert and oriented to person, place, and time.      Sensory: No sensory deficit.      Deep Tendon Reflexes: Reflexes are normal and symmetric.        Pacemaker interrogated.  No significant arrhythmias for the last year.  Atrial pacing 90% of the times.  Ventricular pacing less than 1% of the time    Assessment:       1. PAF (paroxysmal atrial fibrillation)    2. Second degree heart block    3. Hyperlipidemia with target LDL less than 130    4. Essential hypertension    5. Cardiac pacemaker in situ         Plan:   Support hose at all times.   Echocardiogram, nuclear stress test.  Call results  Regular exercise program  Six-month follow-up, same the as the wife's appointment

## 2023-07-11 ENCOUNTER — PES CALL (OUTPATIENT)
Dept: ADMINISTRATIVE | Facility: CLINIC | Age: 78
End: 2023-07-11
Payer: MEDICARE

## 2023-07-19 ENCOUNTER — HOSPITAL ENCOUNTER (OUTPATIENT)
Dept: RADIOLOGY | Facility: HOSPITAL | Age: 78
Discharge: HOME | End: 2023-07-19
Attending: INTERNAL MEDICINE
Payer: MEDICARE

## 2023-07-19 ENCOUNTER — CLINICAL SUPPORT (OUTPATIENT)
Dept: CARDIOLOGY | Facility: HOSPITAL | Age: 78
End: 2023-07-19
Attending: INTERNAL MEDICINE
Payer: MEDICARE

## 2023-07-19 VITALS
HEIGHT: 66 IN | BODY MASS INDEX: 26.03 KG/M2 | WEIGHT: 162 LBS | WEIGHT: 162 LBS | HEIGHT: 66 IN | BODY MASS INDEX: 26.03 KG/M2

## 2023-07-19 DIAGNOSIS — Z95.0 CARDIAC PACEMAKER IN SITU: ICD-10-CM

## 2023-07-19 DIAGNOSIS — I48.0 PAF (PAROXYSMAL ATRIAL FIBRILLATION): ICD-10-CM

## 2023-07-19 DIAGNOSIS — E78.5 HYPERLIPIDEMIA WITH TARGET LDL LESS THAN 130: ICD-10-CM

## 2023-07-19 DIAGNOSIS — I95.1 ORTHOSTATIC HYPOTENSION: ICD-10-CM

## 2023-07-19 DIAGNOSIS — I10 ESSENTIAL HYPERTENSION: ICD-10-CM

## 2023-07-19 DIAGNOSIS — I44.1 SECOND DEGREE HEART BLOCK: ICD-10-CM

## 2023-07-19 PROCEDURE — 78452 NUCLEAR STRESS - CARDIOLOGY INTERPRETED (CUPID ONLY): ICD-10-PCS | Mod: 26,,, | Performed by: INTERNAL MEDICINE

## 2023-07-19 PROCEDURE — 93017 CV STRESS TEST TRACING ONLY: CPT | Mod: PO

## 2023-07-19 PROCEDURE — 93018 CV STRESS TEST I&R ONLY: CPT | Mod: ,,, | Performed by: INTERNAL MEDICINE

## 2023-07-19 PROCEDURE — 93016 CV STRESS TEST SUPVJ ONLY: CPT | Mod: ,,, | Performed by: INTERNAL MEDICINE

## 2023-07-19 PROCEDURE — 78452 HT MUSCLE IMAGE SPECT MULT: CPT | Mod: 26,,, | Performed by: INTERNAL MEDICINE

## 2023-07-19 PROCEDURE — 78452 HT MUSCLE IMAGE SPECT MULT: CPT | Mod: PO

## 2023-07-19 PROCEDURE — A9502 TC99M TETROFOSMIN: HCPCS | Mod: PO

## 2023-07-19 PROCEDURE — 93306 ECHO (CUPID ONLY): ICD-10-PCS | Mod: 26,,, | Performed by: INTERNAL MEDICINE

## 2023-07-19 PROCEDURE — 93018 PR CARDIAC STRESS TST,INTERP/REPT ONLY: ICD-10-PCS | Mod: ,,, | Performed by: INTERNAL MEDICINE

## 2023-07-19 PROCEDURE — 63600175 PHARM REV CODE 636 W HCPCS: Mod: PO | Performed by: INTERNAL MEDICINE

## 2023-07-19 PROCEDURE — 93306 TTE W/DOPPLER COMPLETE: CPT | Mod: 26,,, | Performed by: INTERNAL MEDICINE

## 2023-07-19 PROCEDURE — 93016 NUCLEAR STRESS - CARDIOLOGY INTERPRETED (CUPID ONLY): ICD-10-PCS | Mod: ,,, | Performed by: INTERNAL MEDICINE

## 2023-07-19 PROCEDURE — 93306 TTE W/DOPPLER COMPLETE: CPT | Mod: PO

## 2023-07-19 RX ORDER — REGADENOSON 0.08 MG/ML
0.4 INJECTION, SOLUTION INTRAVENOUS
Status: COMPLETED | OUTPATIENT
Start: 2023-07-19 | End: 2023-07-19

## 2023-07-19 RX ADMIN — REGADENOSON 0.4 MG: 0.08 INJECTION, SOLUTION INTRAVENOUS at 02:07

## 2023-07-20 LAB
ASCENDING AORTA: 3.53 CM
AV INDEX (PROSTH): 0.81
AV MEAN GRADIENT: 6 MMHG
AV PEAK GRADIENT: 9 MMHG
AV REGURGITATION PRESSURE HALF TIME: 519.98 MS
AV VALVE AREA: 3.44 CM2
AV VELOCITY RATIO: 0.7
BSA FOR ECHO PROCEDURE: 1.85 M2
CV ECHO LV RWT: 0.51 CM
CV PHARM DOSE: 0.4 MG
CV STRESS BASE HR: 60 BPM
DIASTOLIC BLOOD PRESSURE: 75 MMHG
DOP CALC AO PEAK VEL: 1.48 M/S
DOP CALC AO VTI: 33 CM
DOP CALC LVOT AREA: 4.3 CM2
DOP CALC LVOT DIAMETER: 2.33 CM
DOP CALC LVOT PEAK VEL: 1.03 M/S
DOP CALC LVOT STROKE VOLUME: 113.36 CM3
DOP CALC MV VTI: 35 CM
DOP CALCLVOT PEAK VEL VTI: 26.6 CM
E WAVE DECELERATION TIME: 221.15 MSEC
E/A RATIO: 0.74
E/E' RATIO: 14.18 M/S
ECHO LV POSTERIOR WALL: 1.19 CM (ref 0.6–1.1)
EJECTION FRACTION: 60 %
FRACTIONAL SHORTENING: 38 % (ref 28–44)
INTERVENTRICULAR SEPTUM: 1.16 CM (ref 0.6–1.1)
LA MAJOR: 5.07 CM
LA MINOR: 4.78 CM
LA WIDTH: 4.4 CM
LEFT ATRIUM SIZE: 4.08 CM
LEFT ATRIUM VOLUME INDEX: 41 ML/M2
LEFT ATRIUM VOLUME: 75.09 CM3
LEFT INTERNAL DIMENSION IN SYSTOLE: 2.91 CM (ref 2.1–4)
LEFT VENTRICLE DIASTOLIC VOLUME INDEX: 55.09 ML/M2
LEFT VENTRICLE DIASTOLIC VOLUME: 100.82 ML
LEFT VENTRICLE MASS INDEX: 111 G/M2
LEFT VENTRICLE SYSTOLIC VOLUME INDEX: 17.7 ML/M2
LEFT VENTRICLE SYSTOLIC VOLUME: 32.38 ML
LEFT VENTRICULAR INTERNAL DIMENSION IN DIASTOLE: 4.67 CM (ref 3.5–6)
LEFT VENTRICULAR MASS: 203.69 G
LV LATERAL E/E' RATIO: 13 M/S
LV SEPTAL E/E' RATIO: 15.6 M/S
LVOT MG: 2.5 MMHG
LVOT MV: 0.76 CM/S
MV MEAN GRADIENT: 2 MMHG
MV PEAK A VEL: 1.05 M/S
MV PEAK E VEL: 0.78 M/S
MV PEAK GRADIENT: 5 MMHG
MV STENOSIS PRESSURE HALF TIME: 64.39 MS
MV VALVE AREA BY CONTINUITY EQUATION: 3.24 CM2
MV VALVE AREA P 1/2 METHOD: 3.42 CM2
NUC STRESS EJECTION FRACTION: 72 %
OHS CV CPX 1 MINUTE RECOVERY HEART RATE: 60 BPM
OHS CV CPX 85 PERCENT MAX PREDICTED HEART RATE MALE: 120
OHS CV CPX MAX PREDICTED HEART RATE: 141
OHS CV CPX PATIENT IS FEMALE: 0
OHS CV CPX PATIENT IS MALE: 1
OHS CV CPX PEAK DIASTOLIC BLOOD PRESSURE: 75 MMHG
OHS CV CPX PEAK HEAR RATE: 64 BPM
OHS CV CPX PEAK RATE PRESSURE PRODUCT: 9344
OHS CV CPX PEAK SYSTOLIC BLOOD PRESSURE: 146 MMHG
OHS CV CPX PERCENT MAX PREDICTED HEART RATE ACHIEVED: 45
OHS CV CPX RATE PRESSURE PRODUCT PRESENTING: 8760
OHS CV PHARM TIME: 1425 MIN
PISA AR MAX VEL: 4.59 M/S
PISA TR MAX VEL: 2.58 M/S
PULM VEIN S/D RATIO: 1.18
PV PEAK D VEL: 0.39 M/S
PV PEAK S VEL: 0.46 M/S
RA MAJOR: 5.17 CM
RA PRESSURE: 3 MMHG
RA WIDTH: 3.6 CM
RIGHT VENTRICULAR END-DIASTOLIC DIMENSION: 3.37 CM
RIGHT VENTRICULAR LENGTH IN DIASTOLE (APICAL 4-CHAMBER VIEW): 6.81 CM
RV MID DIAMA: 2.76 CM
RV TISSUE DOPPLER FREE WALL SYSTOLIC VELOCITY 1 (APICAL 4 CHAMBER VIEW): 14.8 CM/S
SINUS: 3.7 CM
STJ: 3.18 CM
SYSTOLIC BLOOD PRESSURE: 146 MMHG
TDI LATERAL: 0.06 M/S
TDI SEPTAL: 0.05 M/S
TDI: 0.06 M/S
TR MAX PG: 27 MMHG
TRICUSPID ANNULAR PLANE SYSTOLIC EXCURSION: 2.08 CM
TV REST PULMONARY ARTERY PRESSURE: 30 MMHG

## 2023-08-19 ENCOUNTER — CLINICAL SUPPORT (OUTPATIENT)
Dept: CARDIOLOGY | Facility: HOSPITAL | Age: 78
End: 2023-08-19
Payer: MEDICARE

## 2023-08-19 DIAGNOSIS — Z95.0 PRESENCE OF CARDIAC PACEMAKER: ICD-10-CM

## 2023-08-19 PROCEDURE — 93296 REM INTERROG EVL PM/IDS: CPT | Mod: PO | Performed by: INTERNAL MEDICINE

## 2023-09-14 ENCOUNTER — OFFICE VISIT (OUTPATIENT)
Dept: FAMILY MEDICINE | Facility: CLINIC | Age: 78
End: 2023-09-14
Payer: MEDICARE

## 2023-09-14 VITALS
HEIGHT: 66 IN | DIASTOLIC BLOOD PRESSURE: 79 MMHG | HEART RATE: 60 BPM | SYSTOLIC BLOOD PRESSURE: 126 MMHG | BODY MASS INDEX: 25.71 KG/M2 | WEIGHT: 160 LBS

## 2023-09-14 DIAGNOSIS — Z95.0 CARDIAC PACEMAKER IN SITU: ICD-10-CM

## 2023-09-14 DIAGNOSIS — I48.0 PAF (PAROXYSMAL ATRIAL FIBRILLATION): ICD-10-CM

## 2023-09-14 DIAGNOSIS — M10.9 GOUT, UNSPECIFIED CAUSE, UNSPECIFIED CHRONICITY, UNSPECIFIED SITE: ICD-10-CM

## 2023-09-14 DIAGNOSIS — E78.5 HYPERLIPIDEMIA WITH TARGET LDL LESS THAN 130: ICD-10-CM

## 2023-09-14 DIAGNOSIS — D12.6 TUBULAR ADENOMA OF COLON: ICD-10-CM

## 2023-09-14 DIAGNOSIS — I44.1 SECOND DEGREE HEART BLOCK: ICD-10-CM

## 2023-09-14 DIAGNOSIS — N40.1 BENIGN PROSTATIC HYPERPLASIA WITH LOWER URINARY TRACT SYMPTOMS, SYMPTOM DETAILS UNSPECIFIED: ICD-10-CM

## 2023-09-14 DIAGNOSIS — N18.30 STAGE 3 CHRONIC KIDNEY DISEASE, UNSPECIFIED WHETHER STAGE 3A OR 3B CKD: ICD-10-CM

## 2023-09-14 DIAGNOSIS — Z86.73 HISTORY OF STROKE: ICD-10-CM

## 2023-09-14 DIAGNOSIS — I95.1 ORTHOSTATIC HYPOTENSION: Primary | ICD-10-CM

## 2023-09-14 PROCEDURE — 3078F DIAST BP <80 MM HG: CPT | Mod: CPTII,S$GLB,, | Performed by: FAMILY MEDICINE

## 2023-09-14 PROCEDURE — 99214 PR OFFICE/OUTPT VISIT, EST, LEVL IV, 30-39 MIN: ICD-10-PCS | Mod: S$GLB,,, | Performed by: FAMILY MEDICINE

## 2023-09-14 PROCEDURE — 3288F PR FALLS RISK ASSESSMENT DOCUMENTED: ICD-10-PCS | Mod: CPTII,S$GLB,, | Performed by: FAMILY MEDICINE

## 2023-09-14 PROCEDURE — 3288F FALL RISK ASSESSMENT DOCD: CPT | Mod: CPTII,S$GLB,, | Performed by: FAMILY MEDICINE

## 2023-09-14 PROCEDURE — 1159F MED LIST DOCD IN RCRD: CPT | Mod: CPTII,S$GLB,, | Performed by: FAMILY MEDICINE

## 2023-09-14 PROCEDURE — 3074F PR MOST RECENT SYSTOLIC BLOOD PRESSURE < 130 MM HG: ICD-10-PCS | Mod: CPTII,S$GLB,, | Performed by: FAMILY MEDICINE

## 2023-09-14 PROCEDURE — 99999 PR PBB SHADOW E&M-EST. PATIENT-LVL III: CPT | Mod: PBBFAC,,, | Performed by: FAMILY MEDICINE

## 2023-09-14 PROCEDURE — 1157F ADVNC CARE PLAN IN RCRD: CPT | Mod: CPTII,S$GLB,, | Performed by: FAMILY MEDICINE

## 2023-09-14 PROCEDURE — 1157F PR ADVANCE CARE PLAN OR EQUIV PRESENT IN MEDICAL RECORD: ICD-10-PCS | Mod: CPTII,S$GLB,, | Performed by: FAMILY MEDICINE

## 2023-09-14 PROCEDURE — 1101F PT FALLS ASSESS-DOCD LE1/YR: CPT | Mod: CPTII,S$GLB,, | Performed by: FAMILY MEDICINE

## 2023-09-14 PROCEDURE — 99999 PR PBB SHADOW E&M-EST. PATIENT-LVL III: ICD-10-PCS | Mod: PBBFAC,,, | Performed by: FAMILY MEDICINE

## 2023-09-14 PROCEDURE — 1159F PR MEDICATION LIST DOCUMENTED IN MEDICAL RECORD: ICD-10-PCS | Mod: CPTII,S$GLB,, | Performed by: FAMILY MEDICINE

## 2023-09-14 PROCEDURE — 3078F PR MOST RECENT DIASTOLIC BLOOD PRESSURE < 80 MM HG: ICD-10-PCS | Mod: CPTII,S$GLB,, | Performed by: FAMILY MEDICINE

## 2023-09-14 PROCEDURE — 1101F PR PT FALLS ASSESS DOC 0-1 FALLS W/OUT INJ PAST YR: ICD-10-PCS | Mod: CPTII,S$GLB,, | Performed by: FAMILY MEDICINE

## 2023-09-14 PROCEDURE — 99214 OFFICE O/P EST MOD 30 MIN: CPT | Mod: S$GLB,,, | Performed by: FAMILY MEDICINE

## 2023-09-14 PROCEDURE — 1126F PR PAIN SEVERITY QUANTIFIED, NO PAIN PRESENT: ICD-10-PCS | Mod: CPTII,S$GLB,, | Performed by: FAMILY MEDICINE

## 2023-09-14 PROCEDURE — 1126F AMNT PAIN NOTED NONE PRSNT: CPT | Mod: CPTII,S$GLB,, | Performed by: FAMILY MEDICINE

## 2023-09-14 PROCEDURE — 3074F SYST BP LT 130 MM HG: CPT | Mod: CPTII,S$GLB,, | Performed by: FAMILY MEDICINE

## 2023-09-14 RX ORDER — ROSUVASTATIN CALCIUM 10 MG/1
10 TABLET, COATED ORAL DAILY
Qty: 90 TABLET | Refills: 3 | Status: SHIPPED | OUTPATIENT
Start: 2023-09-14 | End: 2024-09-13

## 2023-09-14 NOTE — PATIENT INSTRUCTIONS
Health Maintenance Due   Topic Date Due    COVID-19 Vaccine (6 - Pfizer risk series) 01/10/2023    Influenza Vaccine (1) 09/01/2023    RSV vaccine

## 2023-09-14 NOTE — PROGRESS NOTES
PATIENT PRESENTS FOLLOW-UP.  DOES NOTE SOME CONTINUED EPISODES OF LIGHTHEADEDNESS PRIMARILY WITH STANDING UP or occasionally with lifting something.  He is had this in the past and felt to have orthostasis.  Sees cardiology regularly.  He had recent echocardiogram without severe findings.  Recent nuclear stress test negative.  He does have a pacemaker.  He denies palpitations.  Prior history of possible small stroke on imaging which was asymptomatic otherwise.  He does have gout without recurrence on allopurinol.  He is not currently on statin.  He does take tamsulosin for BPH.  Due for colonoscopy beginning of next year.  Stage 3 chronic kidney disease asymptomatic.    Alexander was seen today for labs only.    Diagnoses and all orders for this visit:    Orthostatic hypotension  -     CBC Auto Differential; Future  -     Comprehensive Metabolic Panel; Future    PAF (paroxysmal atrial fibrillation)    Second degree heart block    Cardiac pacemaker in situ    History of stroke  -     Lipid Panel; Future    Gout, unspecified cause, unspecified chronicity, unspecified site  -     Uric Acid; Future    Hyperlipidemia with target LDL less than 130  -     Lipid Panel; Future    Benign prostatic hyperplasia with lower urinary tract symptoms, symptom details unspecified    Tubular adenoma of colon    Stage 3 chronic kidney disease, unspecified whether stage 3a or 3b CKD  -     Comprehensive Metabolic Panel; Future    Other orders  -     rosuvastatin (CRESTOR) 10 MG tablet; Take 1 tablet (10 mg total) by mouth once daily.    He will stop the tamsulosin to see if this helps with the lightheadedness.  Continue follow-up with his cardiologist.  Add Crestor as above.  Check laboratory above in 3 months.              Past Medical History:  Past Medical History:   Diagnosis Date    Acute idiopathic gout of multiple sites 02/23/2022    Atherosclerosis of aorta 04/13/2021    He has atherosclerosis noted on imaging in the past and no tx  is needed at this time.    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 This is followed by cardiology now.    Cardiac pacemaker in situ 04/13/2021    CKD (chronic kidney disease) stage 3, GFR 30-59 ml/min 12/25/2017    Alexander Patel has an Estimated Glumerular Filtration Rate (EGFR) between 30 and 59 consistent with the definition of chronic kidney disease stage 3.  eGFR if non   Date Value Ref Range Status  04/23/2018 53.8 (A) >60 mL/min/1.73 m^2 Final    Comment:    Calculation used to obtain the estimated glomerular filtration rate (eGFR) is the CKD-EPI equation.       Lab Results  Component     Colon polyp 10/08/2015    Diverticulosis of large intestine without hemorrhage 10/08/2015    Essential hypertension 08/28/2015    Gout 2012    Hyperlipidemia LDL goal < 130     Mild obstructive sleep apnea 02/16/2023    Orthostatic hypotension 07/14/2020    PAF (paroxysmal atrial fibrillation) 12/30/2020    He had an episode of afib in the past and he has not required anticoagulation and is not needing rate control.  He has not had stroke symptoms.    Second degree heart block 08/23/2019    Stroke 09/22/2021    Incidental finding right parietal area on a CT of the brain.    Tubular adenoma of colon 02/25/2021     Past Surgical History:   Procedure Laterality Date    A-V CARDIAC PACEMAKER INSERTION N/A 8/23/2019    Procedure: INSERTION, CARDIAC PACEMAKER, DUAL CHAMBER;  Surgeon: Manas Meade MD;  Location: [facility] CATH;  Service: Cardiology;  Laterality: N/A;    COLONOSCOPY N/A 10/8/2015    Procedure: COLONOSCOPY;  Surgeon: Avinash Lamb MD;  Location: [facility] ENDO;  Service: Endoscopy;  Laterality: N/A;    COLONOSCOPY N/A 2/25/2021    Procedure: COLONOSCOPY;  Surgeon: Avinash Lamb MD;  Location: [facility] ENDO;  Service: Endoscopy;  Laterality: N/A;    FRACTURE SURGERY      hand     Review of patient's allergies indicates:  "  Allergen Reactions    No known drug allergies      Current Outpatient Medications on File Prior to Visit   Medication Sig Dispense Refill    allopurinoL (ZYLOPRIM) 100 MG tablet Take 1.5 tablets (150 mg total) by mouth once daily. 135 tablet 3    aspirin 81 MG chewable tablet Take 81 mg by mouth once daily. Every day      GLUCOSAMINE HCL/CHONDR LAM A NA (OSTEO BI-FLEX ORAL) Take 2 tablets by mouth once daily.       multivit-min/FA/lycopen/lutein (CENTRUM SILVER MEN ORAL) Take 1 tablet by mouth once daily.      tamsulosin (FLOMAX) 0.4 mg Cap TAKE 1 CAPSULE BY MOUTH  EVERY THIRD DAY 30 capsule 3     No current facility-administered medications on file prior to visit.     Social History     Socioeconomic History    Marital status:      Spouse name: Modesta    Number of children: 2   Occupational History    Occupation: retired   Tobacco Use    Smoking status: Never    Smokeless tobacco: Former     Types: Chew   Substance and Sexual Activity    Alcohol use: Yes     Comment: occasional    Drug use: No    Sexual activity: Yes     Family History   Problem Relation Age of Onset    Heart disease Mother     Lymphoma Mother     Colon polyps Mother     Alcohol abuse Father     Emphysema Father     Lymphoma Brother            ROS:  GENERAL: No fever, chills,  or significant weight changes.   CARDIOVASCULAR: Denies chest pain, PND, orthopnea or reduced exercise tolerance.  ABDOMEN: Appetite fine. Denies diarrhea, abdominal pain, hematemesis or blood in stool.  URINARY: No flank pain, dysuria or hematuria.    Vitals:    09/14/23 1104   BP: 126/79   Pulse: 60   Weight: 72.6 kg (160 lb)   Height: 5' 6" (1.676 m)     Wt Readings from Last 3 Encounters:   09/14/23 72.6 kg (160 lb)   07/19/23 73.5 kg (162 lb)   07/19/23 73.5 kg (162 lb)       OBJECTIVE:   APPEARANCE: Well nourished, well developed, in no acute distress.    HEAD: Normocephalic.  Atraumatic.  No sinus tenderness.  EYES:   Right eye: Pupil reactive.  Conjunctiva " clear.    Left eye: Pupil reactive.  Conjunctiva clear.  EOMI.    EARS: TM's intact. Light reflex normal. No retraction or perforation.    NOSE:  clear.  MOUTH & THROAT:  No pharyngeal erythema or exudate. No lesions.  NECK: Supple. No bruits.  No JVD.  No cervical lymphadenopathy.  No thyromegaly.    CHEST: Breath sounds clear bilaterally.  Normal respiratory effort  CARDIOVASCULAR: Normal rate.  Regular rhythm.  No murmurs.  No rub.  No gallops.  ABDOMEN: Bowel sounds normal.  Soft.  No tenderness.  No organomegaly.  PERIPHERAL VASCULAR: No cyanosis.  No clubbing.  No edema.  NEUROLOGIC: No focal findings.  MENTAL STATUS: Alert.  Oriented x 3.

## 2023-09-18 ENCOUNTER — TELEPHONE (OUTPATIENT)
Dept: NEUROLOGY | Facility: CLINIC | Age: 78
End: 2023-09-18

## 2023-09-18 ENCOUNTER — OFFICE VISIT (OUTPATIENT)
Dept: NEUROLOGY | Facility: CLINIC | Age: 78
End: 2023-09-18
Payer: MEDICARE

## 2023-09-18 ENCOUNTER — PATIENT OUTREACH (OUTPATIENT)
Dept: ADMINISTRATIVE | Facility: HOSPITAL | Age: 78
End: 2023-09-18
Payer: MEDICARE

## 2023-09-18 VITALS
HEART RATE: 60 BPM | BODY MASS INDEX: 26.12 KG/M2 | SYSTOLIC BLOOD PRESSURE: 167 MMHG | WEIGHT: 161.81 LBS | RESPIRATION RATE: 16 BRPM | DIASTOLIC BLOOD PRESSURE: 79 MMHG

## 2023-09-18 DIAGNOSIS — E53.8 B12 DEFICIENCY: ICD-10-CM

## 2023-09-18 DIAGNOSIS — I63.89 OTHER CEREBRAL INFARCTION: ICD-10-CM

## 2023-09-18 DIAGNOSIS — R41.3 OTHER AMNESIA: Primary | ICD-10-CM

## 2023-09-18 PROCEDURE — 3077F SYST BP >= 140 MM HG: CPT | Mod: CPTII,S$GLB,, | Performed by: PSYCHIATRY & NEUROLOGY

## 2023-09-18 PROCEDURE — 1160F PR REVIEW ALL MEDS BY PRESCRIBER/CLIN PHARMACIST DOCUMENTED: ICD-10-PCS | Mod: CPTII,S$GLB,, | Performed by: PSYCHIATRY & NEUROLOGY

## 2023-09-18 PROCEDURE — 99205 OFFICE O/P NEW HI 60 MIN: CPT | Mod: S$GLB,,, | Performed by: PSYCHIATRY & NEUROLOGY

## 2023-09-18 PROCEDURE — 99999 PR PBB SHADOW E&M-EST. PATIENT-LVL IV: ICD-10-PCS | Mod: PBBFAC,,, | Performed by: PSYCHIATRY & NEUROLOGY

## 2023-09-18 PROCEDURE — 1157F ADVNC CARE PLAN IN RCRD: CPT | Mod: CPTII,S$GLB,, | Performed by: PSYCHIATRY & NEUROLOGY

## 2023-09-18 PROCEDURE — 1126F AMNT PAIN NOTED NONE PRSNT: CPT | Mod: CPTII,S$GLB,, | Performed by: PSYCHIATRY & NEUROLOGY

## 2023-09-18 PROCEDURE — 1160F RVW MEDS BY RX/DR IN RCRD: CPT | Mod: CPTII,S$GLB,, | Performed by: PSYCHIATRY & NEUROLOGY

## 2023-09-18 PROCEDURE — 3078F PR MOST RECENT DIASTOLIC BLOOD PRESSURE < 80 MM HG: ICD-10-PCS | Mod: CPTII,S$GLB,, | Performed by: PSYCHIATRY & NEUROLOGY

## 2023-09-18 PROCEDURE — 3288F PR FALLS RISK ASSESSMENT DOCUMENTED: ICD-10-PCS | Mod: CPTII,S$GLB,, | Performed by: PSYCHIATRY & NEUROLOGY

## 2023-09-18 PROCEDURE — 1126F PR PAIN SEVERITY QUANTIFIED, NO PAIN PRESENT: ICD-10-PCS | Mod: CPTII,S$GLB,, | Performed by: PSYCHIATRY & NEUROLOGY

## 2023-09-18 PROCEDURE — 99999 PR PBB SHADOW E&M-EST. PATIENT-LVL IV: CPT | Mod: PBBFAC,,, | Performed by: PSYCHIATRY & NEUROLOGY

## 2023-09-18 PROCEDURE — 1101F PT FALLS ASSESS-DOCD LE1/YR: CPT | Mod: CPTII,S$GLB,, | Performed by: PSYCHIATRY & NEUROLOGY

## 2023-09-18 PROCEDURE — 3078F DIAST BP <80 MM HG: CPT | Mod: CPTII,S$GLB,, | Performed by: PSYCHIATRY & NEUROLOGY

## 2023-09-18 PROCEDURE — 99205 PR OFFICE/OUTPT VISIT, NEW, LEVL V, 60-74 MIN: ICD-10-PCS | Mod: S$GLB,,, | Performed by: PSYCHIATRY & NEUROLOGY

## 2023-09-18 PROCEDURE — 1159F MED LIST DOCD IN RCRD: CPT | Mod: CPTII,S$GLB,, | Performed by: PSYCHIATRY & NEUROLOGY

## 2023-09-18 PROCEDURE — 1157F PR ADVANCE CARE PLAN OR EQUIV PRESENT IN MEDICAL RECORD: ICD-10-PCS | Mod: CPTII,S$GLB,, | Performed by: PSYCHIATRY & NEUROLOGY

## 2023-09-18 PROCEDURE — 3077F PR MOST RECENT SYSTOLIC BLOOD PRESSURE >= 140 MM HG: ICD-10-PCS | Mod: CPTII,S$GLB,, | Performed by: PSYCHIATRY & NEUROLOGY

## 2023-09-18 PROCEDURE — 3288F FALL RISK ASSESSMENT DOCD: CPT | Mod: CPTII,S$GLB,, | Performed by: PSYCHIATRY & NEUROLOGY

## 2023-09-18 PROCEDURE — 1159F PR MEDICATION LIST DOCUMENTED IN MEDICAL RECORD: ICD-10-PCS | Mod: CPTII,S$GLB,, | Performed by: PSYCHIATRY & NEUROLOGY

## 2023-09-18 PROCEDURE — 1101F PR PT FALLS ASSESS DOC 0-1 FALLS W/OUT INJ PAST YR: ICD-10-PCS | Mod: CPTII,S$GLB,, | Performed by: PSYCHIATRY & NEUROLOGY

## 2023-09-18 RX ORDER — LANOLIN ALCOHOL/MO/W.PET/CERES
1000 CREAM (GRAM) TOPICAL DAILY
Start: 2023-09-18

## 2023-09-18 NOTE — PROGRESS NOTES
Date: 9/18/2023    Patient ID: Alexander Patel is a 78 y.o. male.    Referring Provider:  Jai Mmcahon MD    Chief Complaint: Memory Loss      History of Present Illness:  Mr. Patel is a 78 y.o. male who presents referred by Jai Mcmahon MD today for evaluation of memory loss. The patient was accompanied by his wife who also contributed to the following history.     They have noticed memory loss for about 2 years and progressively worsening. He has noticed more word finding trouble lately. His wife notes that they will be going somewhere familiar and he wont' know where to go. His thought process is off sometimes. He drives locally but doesn't drive further. He feels uncomfortable with it and his wife has been driving more. He does little projects and fix it things around the house fine. She pays the bills but always has.     He notes he tends to stoop over. He sometimes shuffles when he walks. No falls.     He has hearing loss and wears hearing aids.     He has mild THERESE diagnosis. He does not wear a CPAP. Did not tolerate it. That was years ago. He snores per his wife. He has a pacemaker.     No family history of memory trouble.     B12 level was 285.   CT head in 2021 showed right parietal ischemic stroke.   Carotid US in 2022 showed no stenosis.     Allergies:  Review of patient's allergies indicates:   Allergen Reactions    No known drug allergies        Current Medications:  Current Outpatient Medications   Medication Sig Dispense Refill    allopurinoL (ZYLOPRIM) 100 MG tablet Take 1.5 tablets (150 mg total) by mouth once daily. 135 tablet 3    aspirin 81 MG chewable tablet Take 81 mg by mouth once daily. Every day      GLUCOSAMINE HCL/CHONDR LAM A NA (OSTEO BI-FLEX ORAL) Take 2 tablets by mouth once daily.       multivit-min/FA/lycopen/lutein (CENTRUM SILVER MEN ORAL) Take 1 tablet by mouth once daily.      rosuvastatin (CRESTOR) 10 MG tablet Take 1 tablet (10 mg total) by mouth once daily. 90  tablet 3    cyanocobalamin (VITAMIN B-12) 1000 MCG tablet Take 1 tablet (1,000 mcg total) by mouth once daily.      tamsulosin (FLOMAX) 0.4 mg Cap TAKE 1 CAPSULE BY MOUTH  EVERY THIRD DAY (Patient not taking: Reported on 9/18/2023) 30 capsule 3     No current facility-administered medications for this visit.       Past Medical History:  Past Medical History:   Diagnosis Date    Acute idiopathic gout of multiple sites 02/23/2022    Atherosclerosis of aorta 04/13/2021    He has atherosclerosis noted on imaging in the past and no tx is needed at this time.    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 This is followed by cardiology now.    Cardiac pacemaker in situ 04/13/2021    CKD (chronic kidney disease) stage 3, GFR 30-59 ml/min 12/25/2017    Alexander Patel has an Estimated Glumerular Filtration Rate (EGFR) between 30 and 59 consistent with the definition of chronic kidney disease stage 3.  eGFR if non   Date Value Ref Range Status  04/23/2018 53.8 (A) >60 mL/min/1.73 m^2 Final    Comment:    Calculation used to obtain the estimated glomerular filtration rate (eGFR) is the CKD-EPI equation.       Lab Results  Component     Colon polyp 10/08/2015    Diverticulosis of large intestine without hemorrhage 10/08/2015    Essential hypertension 08/28/2015    Gout 2012    Hyperlipidemia LDL goal < 130     Mild obstructive sleep apnea 02/16/2023    Orthostatic hypotension 07/14/2020    PAF (paroxysmal atrial fibrillation) 12/30/2020    He had an episode of afib in the past and he has not required anticoagulation and is not needing rate control.  He has not had stroke symptoms.    Second degree heart block 08/23/2019    Tubular adenoma of colon 02/25/2021       Past Surgical History:  Past Surgical History:   Procedure Laterality Date    A-V CARDIAC PACEMAKER INSERTION N/A 8/23/2019    Procedure: INSERTION, CARDIAC PACEMAKER, DUAL CHAMBER;   Surgeon: Manas Meade MD;  Location: [facility] CATH;  Service: Cardiology;  Laterality: N/A;    COLONOSCOPY N/A 10/8/2015    Procedure: COLONOSCOPY;  Surgeon: Avinash Lamb MD;  Location: [facility] ENDO;  Service: Endoscopy;  Laterality: N/A;    COLONOSCOPY N/A 2/25/2021    Procedure: COLONOSCOPY;  Surgeon: Avinash Lamb MD;  Location: [facility] ENDO;  Service: Endoscopy;  Laterality: N/A;    FRACTURE SURGERY      hand       Family History:  family history includes Alcohol abuse in his father; Colon polyps in his mother; Emphysema in his father; Heart disease in his mother; Lymphoma in his brother and mother.    Social History:   reports that he has never smoked. He has quit using smokeless tobacco.  His smokeless tobacco use included chew. He reports current alcohol use. He reports that he does not use drugs.    Physical Exam:  Vitals:    09/18/23 0851   BP: (!) 167/79   Pulse: 60   Resp: 16   Weight: 73.4 kg (161 lb 13.1 oz)   PainSc: 0-No pain     Body mass index is 26.12 kg/m².    Neurological Exam:  Mental status: Awake, alert. MMSE 25/30  Speech/Language: No dysarthria or aphasia on conversation.   Cranial nerves: Pupils equal round and reactive to light, extraocular movements intact, facial strength and sensation intact bilaterally, tongue midline, hearing grossly intact bilaterally. Shoulder shrug normal bilaterally.   Motor: 5 out of 5 strength throughout the upper and lower extremities bilaterally. Normal bulk and tone.   Sensation: Intact to light touch and vibration bilaterally.  DTR: 2+ at the knees and biceps bilaterally.  Coordination: mild bradykinesia. No tremor.   Gait: Normal stride length. Somewhat stooped posture.     Data:  I have personally reviewed the referring provider's notes, labs, & imaging made available to me today.     Labs:  CBC:   Lab Results   Component Value Date    WBC 4.60 02/17/2023    HGB 11.8 (L) 02/17/2023    HCT 36.0 (L) 02/17/2023     02/17/2023    MCV 92  "02/17/2023    RDW 13.1 02/17/2023     BMP:   Lab Results   Component Value Date     02/17/2023    K 4.7 02/17/2023     02/17/2023    CO2 25 02/17/2023    BUN 22 02/17/2023    CREATININE 1.6 (H) 02/17/2023    GLU 93 02/17/2023    CALCIUM 9.3 02/17/2023    MG 2.1 07/02/2020    PHOS 3.7 08/17/2020     LFTS;   Lab Results   Component Value Date    PROT 6.6 02/17/2023    ALBUMIN 4.1 02/17/2023    BILITOT 0.5 02/17/2023    AST 22 02/17/2023    ALKPHOS 73 02/17/2023    ALT 14 02/17/2023     COAGS: No results found for: "INR", "PROTIME", "PTT"  FLP:   Lab Results   Component Value Date    CHOL 200 (H) 02/17/2023    HDL 50 02/17/2023    LDLCALC 134.0 02/17/2023    TRIG 80 02/17/2023    CHOLHDL 25.0 02/17/2023       Imaging:  I have personally reviewed the imaging, CT head shows round right parietal hypodensity concerning for stroke.     Assessment and Plan:  Mr. Patel is a 78 y.o. male referred to me by Jai Mcmahon MD for evaluation of memory loss. His MMSE is 25/30 and he does have some very subtle bradykinesia on exam.     CT head previously showed stroke. He is on asa and Crestor. LDL goal <70. Will repeat CT head (can't have MRI per wife due to pacemaker) to evaluate for new vascular changes.     Will obtain neuropsych testing and f/u after testing. Could consider aricept and/or sinemet trial. May obtain DATscan pending other testing.     Advise repeat sleep medicine evaluation for untreated sleep apnea.     B12 level was low previously. Will supplement with 1000 mcg daily.     Other amnesia  -     Ambulatory referral/consult to Neuropsychology; Future; Expected date: 09/25/2023  -     CT Head Without Contrast; Future; Expected date: 09/18/2023  -     Ambulatory referral/consult to Sleep Disorders; Future; Expected date: 09/25/2023    Other cerebral infarction  -     Ambulatory referral/consult to Neuropsychology; Future; Expected date: 09/25/2023  -     CT Head Without Contrast; Future; Expected date: " 09/18/2023  -     Ambulatory referral/consult to Sleep Disorders; Future; Expected date: 09/25/2023    B12 deficiency  -     Ambulatory referral/consult to Neuropsychology; Future; Expected date: 09/25/2023  -     Ambulatory referral/consult to Sleep Disorders; Future; Expected date: 09/25/2023    Other orders  -     cyanocobalamin (VITAMIN B-12) 1000 MCG tablet; Take 1 tablet (1,000 mcg total) by mouth once daily.         I spent a total of 60 minutes on the day of the visit.This includes face to face time and non-face to face time preparing to see the patient (eg, review of tests), Obtaining and/or reviewing separately obtained history, Documenting clinical information in the electronic or other health record, Independently interpreting results and communicating results to the patient/family/caregiver, or Care coordination.

## 2023-09-18 NOTE — TELEPHONE ENCOUNTER
Patients wife asking to have CT scan done at [facility] in [place]. Advised [place] location does not perform CT scans. Patients wife voiced understanding.

## 2023-09-18 NOTE — TELEPHONE ENCOUNTER
----- Message from Rhonda Blackman sent at 9/18/2023  3:06 PM CDT -----  Type: Needs Medical Advice  Who Called:  pt    Best Call Back Number: [phone]    Additional Information: pt calling in regards to ct scan please advise

## 2023-09-18 NOTE — PROGRESS NOTES
Updates were requested from care everywhere.  Health Maintenance has been updated.  LINKS immunization registry triggered.  Immunizations were reconciled.  Pt declined flu vaccine 09/18/2023

## 2023-09-22 NOTE — PROGRESS NOTES
Problem: Respiratory - Adult  Goal: Clear lung sounds  9/22/2023 1056 by Noemy Guerrero RN  Outcome: Progressing     Problem: Respiratory - Adult  Goal: Achieves optimal ventilation and oxygenation  9/22/2023 1056 by Noemy Guerrero RN  Outcome: Progressing  Flowsheets (Taken 9/22/2023 0816)  Achieves optimal ventilation and oxygenation:   Assess for changes in respiratory status   Assess for changes in mentation and behavior     Problem: Safety - Adult  Goal: Free from fall injury  9/22/2023 1056 by Noemy Guerrero RN  Outcome: Progressing   Fall assessment completed. Patient using call light appropriately to call for assistance with ambulation to bathroom. Personal items within reach. Patient is also compliant with use of non-skid slippers. Problem: Chronic Conditions and Co-morbidities  Goal: Patient's chronic conditions and co-morbidity symptoms are monitored and maintained or improved  9/22/2023 1056 by Noemy Guerrero RN  Outcome: Progressing  Flowsheets (Taken 9/22/2023 6442)  Care Plan - Patient's Chronic Conditions and Co-Morbidity Symptoms are Monitored and Maintained or Improved: Monitor and assess patient's chronic conditions and comorbid symptoms for stability, deterioration, or improvement     Problem: Pain  Goal: Verbalizes/displays adequate comfort level or baseline comfort level  9/22/2023 1056 by Noemy Guerrero RN  Outcome: Progressing   Pain Assessment: 0-10  Pain Level: 2   Patient's Stated Pain Goal: 0 - No pain   Is pain goal met at this time? No     Non-Pharmaceutical Pain Intervention(s): Rest    Problem: Skin/Tissue Integrity  Goal: Absence of new skin breakdown  Description: 1. Monitor for areas of redness and/or skin breakdown  2. Assess vascular access sites hourly  3. Every 4-6 hours minimum:  Change oxygen saturation probe site  4.   Every 4-6 hours:  If on nasal continuous positive airway pressure, respiratory therapy assess nares and determine need for appliance change or Subjective:    Patient ID:  Alexander Patel is a 75 y.o. male who presents for follow-up of PPM    HPI  He comes for follow up with no major problems, no chest pain, no shortness of breath.  FC II    Review of Systems   Constitution: Negative for decreased appetite, malaise/fatigue, weight gain and weight loss.   Cardiovascular: Negative for chest pain, dyspnea on exertion, leg swelling, palpitations and syncope.   Respiratory: Negative for cough and shortness of breath.    Gastrointestinal: Negative.    Neurological: Negative for weakness.   All other systems reviewed and are negative.       Objective:      Physical Exam   Constitutional: He is oriented to person, place, and time. He appears well-developed and well-nourished.   HENT:   Head: Normocephalic.   Eyes: Pupils are equal, round, and reactive to light.   Neck: Normal range of motion. Neck supple. No JVD present. Carotid bruit is not present. No thyromegaly present.   Cardiovascular: Normal rate, regular rhythm, normal heart sounds, intact distal pulses and normal pulses. PMI is not displaced. Exam reveals no gallop.   No murmur heard.  Pulmonary/Chest: Effort normal and breath sounds normal.   Abdominal: Soft. Normal appearance. He exhibits no mass. There is no hepatosplenomegaly. There is no abdominal tenderness.   Musculoskeletal: Normal range of motion.         General: No edema.   Neurological: He is alert and oriented to person, place, and time. He has normal strength and normal reflexes. No sensory deficit.   Skin: Skin is warm and intact.   Psychiatric: He has a normal mood and affect.   Nursing note and vitals reviewed.        Assessment:       1. Essential hypertension    2. Sinus bradycardia, chronic    3. Hyperlipidemia with target LDL less than 130         Plan:     Continue all cardiac medications  Regular exercise program  Weight loss  1 yr f/u with maynor sandra and with ccphil           resting period. 9/22/2023 1056 by Keli Alford RN  Outcome: Progressing   No skin breakdown this shift. Patient being assisted with turning. Patients states understanding of repositioning every two hours. Problem: Infection - Adult  Goal: Absence of infection at discharge  9/22/2023 1056 by Keli Alford RN  Outcome: Progressing  Flowsheets (Taken 9/22/2023 3401)  Absence of infection at discharge:   Assess and monitor for signs and symptoms of infection   Monitor lab/diagnostic results   Monitor all insertion sites i.e., indwelling lines, tubes and drains     Care plan reviewed with patient. Patient verbalize understanding of the plan of care and contribute to goal setting.

## 2023-09-25 ENCOUNTER — HOSPITAL ENCOUNTER (OUTPATIENT)
Dept: RADIOLOGY | Facility: HOSPITAL | Age: 78
Discharge: HOME | End: 2023-09-25
Attending: PSYCHIATRY & NEUROLOGY
Payer: MEDICARE

## 2023-09-25 DIAGNOSIS — R41.3 OTHER AMNESIA: ICD-10-CM

## 2023-09-25 DIAGNOSIS — I63.89 OTHER CEREBRAL INFARCTION: ICD-10-CM

## 2023-09-25 PROCEDURE — 70450 CT HEAD WITHOUT CONTRAST: ICD-10-PCS | Mod: 26,,, | Performed by: RADIOLOGY

## 2023-09-25 PROCEDURE — 70450 CT HEAD/BRAIN W/O DYE: CPT | Mod: 26,,, | Performed by: RADIOLOGY

## 2023-09-25 PROCEDURE — 70450 CT HEAD/BRAIN W/O DYE: CPT | Mod: TC,PO

## 2023-10-02 ENCOUNTER — TELEPHONE (OUTPATIENT)
Dept: NEUROLOGY | Facility: CLINIC | Age: 78
End: 2023-10-02
Payer: MEDICARE

## 2023-10-02 NOTE — TELEPHONE ENCOUNTER
----- Message from Caryn Herrera sent at 10/2/2023  2:53 PM CDT -----  Regarding: Medical Referral Status  Contact: patient's wife at [phone]  Type: Medical Referral Status  Who Called:  patient's wife at [phone]    Additional Information: checking on the status of the referral in the system. Please call and advise or text is even better or the patient portal. Thank you

## 2023-11-17 ENCOUNTER — CLINICAL SUPPORT (OUTPATIENT)
Dept: CARDIOLOGY | Facility: HOSPITAL | Age: 78
End: 2023-11-17
Payer: MEDICARE

## 2023-11-17 DIAGNOSIS — Z95.0 PRESENCE OF CARDIAC PACEMAKER: ICD-10-CM

## 2023-11-17 PROCEDURE — 93296 REM INTERROG EVL PM/IDS: CPT | Mod: PO | Performed by: INTERNAL MEDICINE

## 2023-11-17 PROCEDURE — 93294 CARDIAC DEVICE CHECK - REMOTE: ICD-10-PCS | Mod: ,,, | Performed by: INTERNAL MEDICINE

## 2023-11-17 PROCEDURE — 93294 REM INTERROG EVL PM/LDLS PM: CPT | Mod: ,,, | Performed by: INTERNAL MEDICINE

## 2023-11-30 ENCOUNTER — PATIENT OUTREACH (OUTPATIENT)
Dept: ADMINISTRATIVE | Facility: HOSPITAL | Age: 78
End: 2023-11-30
Payer: MEDICARE

## 2023-12-14 ENCOUNTER — LAB VISIT (OUTPATIENT)
Dept: LAB | Facility: HOSPITAL | Age: 78
End: 2023-12-14
Attending: FAMILY MEDICINE
Payer: MEDICARE

## 2023-12-14 DIAGNOSIS — N18.30 STAGE 3 CHRONIC KIDNEY DISEASE, UNSPECIFIED WHETHER STAGE 3A OR 3B CKD: ICD-10-CM

## 2023-12-14 DIAGNOSIS — E78.5 HYPERLIPIDEMIA WITH TARGET LDL LESS THAN 130: ICD-10-CM

## 2023-12-14 DIAGNOSIS — Z86.73 HISTORY OF STROKE: ICD-10-CM

## 2023-12-14 DIAGNOSIS — M10.9 GOUT, UNSPECIFIED CAUSE, UNSPECIFIED CHRONICITY, UNSPECIFIED SITE: ICD-10-CM

## 2023-12-14 DIAGNOSIS — I95.1 ORTHOSTATIC HYPOTENSION: ICD-10-CM

## 2023-12-14 LAB
ALBUMIN SERPL BCP-MCNC: 4.2 G/DL (ref 3.5–5.2)
ALP SERPL-CCNC: 74 U/L (ref 55–135)
ALT SERPL W/O P-5'-P-CCNC: 16 U/L (ref 10–44)
ANION GAP SERPL CALC-SCNC: 9 MMOL/L (ref 8–16)
AST SERPL-CCNC: 22 U/L (ref 10–40)
BASOPHILS # BLD AUTO: 0.04 K/UL (ref 0–0.2)
BASOPHILS NFR BLD: 0.6 % (ref 0–1.9)
BILIRUB SERPL-MCNC: 0.5 MG/DL (ref 0.1–1)
BUN SERPL-MCNC: 18 MG/DL (ref 8–23)
CALCIUM SERPL-MCNC: 9.4 MG/DL (ref 8.7–10.5)
CHLORIDE SERPL-SCNC: 106 MMOL/L (ref 95–110)
CHOLEST SERPL-MCNC: 144 MG/DL (ref 120–199)
CHOLEST/HDLC SERPL: 2.9 {RATIO} (ref 2–5)
CO2 SERPL-SCNC: 27 MMOL/L (ref 23–29)
CREAT SERPL-MCNC: 1.4 MG/DL (ref 0.5–1.4)
DIFFERENTIAL METHOD: ABNORMAL
EOSINOPHIL # BLD AUTO: 0.2 K/UL (ref 0–0.5)
EOSINOPHIL NFR BLD: 2.8 % (ref 0–8)
ERYTHROCYTE [DISTWIDTH] IN BLOOD BY AUTOMATED COUNT: 13.3 % (ref 11.5–14.5)
EST. GFR  (NO RACE VARIABLE): 51.1 ML/MIN/1.73 M^2
GLUCOSE SERPL-MCNC: 101 MG/DL (ref 70–110)
HCT VFR BLD AUTO: 36.1 % (ref 40–54)
HDLC SERPL-MCNC: 50 MG/DL (ref 40–75)
HDLC SERPL: 34.7 % (ref 20–50)
HGB BLD-MCNC: 11.8 G/DL (ref 14–18)
IMM GRANULOCYTES # BLD AUTO: 0.02 K/UL (ref 0–0.04)
IMM GRANULOCYTES NFR BLD AUTO: 0.3 % (ref 0–0.5)
LDLC SERPL CALC-MCNC: 71 MG/DL (ref 63–159)
LYMPHOCYTES # BLD AUTO: 1.6 K/UL (ref 1–4.8)
LYMPHOCYTES NFR BLD: 24.5 % (ref 18–48)
MCH RBC QN AUTO: 30.6 PG (ref 27–31)
MCHC RBC AUTO-ENTMCNC: 32.7 G/DL (ref 32–36)
MCV RBC AUTO: 94 FL (ref 82–98)
MONOCYTES # BLD AUTO: 0.6 K/UL (ref 0.3–1)
MONOCYTES NFR BLD: 9.7 % (ref 4–15)
NEUTROPHILS # BLD AUTO: 4 K/UL (ref 1.8–7.7)
NEUTROPHILS NFR BLD: 62.1 % (ref 38–73)
NONHDLC SERPL-MCNC: 94 MG/DL
NRBC BLD-RTO: 0 /100 WBC
PLATELET # BLD AUTO: 199 K/UL (ref 150–450)
PMV BLD AUTO: 10.6 FL (ref 9.2–12.9)
POTASSIUM SERPL-SCNC: 4.6 MMOL/L (ref 3.5–5.1)
PROT SERPL-MCNC: 7.1 G/DL (ref 6–8.4)
RBC # BLD AUTO: 3.86 M/UL (ref 4.6–6.2)
SODIUM SERPL-SCNC: 142 MMOL/L (ref 136–145)
TRIGL SERPL-MCNC: 115 MG/DL (ref 30–150)
URATE SERPL-MCNC: 6.1 MG/DL (ref 3.4–7)
WBC # BLD AUTO: 6.38 K/UL (ref 3.9–12.7)

## 2023-12-14 PROCEDURE — 85025 COMPLETE CBC W/AUTO DIFF WBC: CPT | Performed by: FAMILY MEDICINE

## 2023-12-14 PROCEDURE — 36415 COLL VENOUS BLD VENIPUNCTURE: CPT | Mod: PO | Performed by: FAMILY MEDICINE

## 2023-12-14 PROCEDURE — 84550 ASSAY OF BLOOD/URIC ACID: CPT | Performed by: FAMILY MEDICINE

## 2023-12-14 PROCEDURE — 80061 LIPID PANEL: CPT | Performed by: FAMILY MEDICINE

## 2023-12-14 PROCEDURE — 80053 COMPREHEN METABOLIC PANEL: CPT | Performed by: FAMILY MEDICINE

## 2024-01-25 ENCOUNTER — OFFICE VISIT (OUTPATIENT)
Dept: CARDIOLOGY | Facility: CLINIC | Age: 79
End: 2024-01-25
Payer: MEDICARE

## 2024-01-25 ENCOUNTER — TELEPHONE (OUTPATIENT)
Dept: NEUROLOGY | Facility: CLINIC | Age: 79
End: 2024-01-25
Payer: MEDICARE

## 2024-01-25 VITALS
WEIGHT: 161.38 LBS | DIASTOLIC BLOOD PRESSURE: 81 MMHG | SYSTOLIC BLOOD PRESSURE: 156 MMHG | BODY MASS INDEX: 26.89 KG/M2 | HEART RATE: 60 BPM | HEIGHT: 65 IN

## 2024-01-25 DIAGNOSIS — I48.0 PAF (PAROXYSMAL ATRIAL FIBRILLATION): ICD-10-CM

## 2024-01-25 DIAGNOSIS — I70.0 ATHEROSCLEROSIS OF AORTA: ICD-10-CM

## 2024-01-25 DIAGNOSIS — I44.1 SECOND DEGREE HEART BLOCK: Primary | ICD-10-CM

## 2024-01-25 PROCEDURE — 99214 OFFICE O/P EST MOD 30 MIN: CPT | Mod: S$GLB,,, | Performed by: INTERNAL MEDICINE

## 2024-01-25 PROCEDURE — 99999 PR PBB SHADOW E&M-EST. PATIENT-LVL III: CPT | Mod: PBBFAC,,, | Performed by: INTERNAL MEDICINE

## 2024-01-25 NOTE — PROGRESS NOTES
Subjective:    Patient ID:  Alexander Patel is a 78 y.o. male who presents for follow-up of Follow-up      HPI  He comes for follow up with no major problems, no chest pain, no shortness of breath.  No cardiac complaints at this time     Functional class 2.  Blood pressure normal at home.      Review of Systems   Constitutional: Negative for decreased appetite, malaise/fatigue, weight gain and weight loss.   Cardiovascular:  Negative for chest pain, dyspnea on exertion, leg swelling, palpitations and syncope.   Respiratory:  Negative for cough and shortness of breath.    Gastrointestinal: Negative.    Neurological:  Negative for weakness.   All other systems reviewed and are negative.       Objective:      Physical Exam  Vitals and nursing note reviewed.   Constitutional:       Appearance: Normal appearance. He is well-developed.   HENT:      Head: Normocephalic.   Eyes:      Pupils: Pupils are equal, round, and reactive to light.   Neck:      Thyroid: No thyromegaly.      Vascular: No carotid bruit or JVD.   Cardiovascular:      Rate and Rhythm: Normal rate and regular rhythm.      Chest Wall: PMI is not displaced.      Pulses: Normal pulses and intact distal pulses.      Heart sounds: Normal heart sounds. No murmur heard.     No gallop.   Pulmonary:      Effort: Pulmonary effort is normal.      Breath sounds: Normal breath sounds.   Abdominal:      Palpations: Abdomen is soft. There is no mass.      Tenderness: There is no abdominal tenderness.   Musculoskeletal:         General: Normal range of motion.      Cervical back: Normal range of motion and neck supple.   Skin:     General: Skin is warm.   Neurological:      Mental Status: He is alert and oriented to person, place, and time.      Sensory: No sensory deficit.      Deep Tendon Reflexes: Reflexes are normal and symmetric.             Most Recent EKG Results  Results for orders placed or performed in visit on 06/30/22   EKG 12-lead    Collection Time: 06/30/22   8:34 AM    Narrative    Test Reason : I95.9,R42,    Vent. Rate : 060 BPM     Atrial Rate : 060 BPM     P-R Int : 290 ms          QRS Dur : 090 ms      QT Int : 400 ms       P-R-T Axes : 000 -27 -04 degrees     QTc Int : 400 ms    Atrial-paced rhythm with prolonged AV conduction  Abnormal ECG  When compared with ECG of 02-JUL-2020 16:50,  No significant change was found  Confirmed by SEA MIRANDA MD ([id number]) on 7/1/2022 1:23:36 PM    Referred By: KHLOE SALCEDO           Confirmed By:SEA MIRANDA MD       Most Recent Echocardiogram Results  Results for orders placed in visit on 07/19/23    Echo    Interpretation Summary  · The left ventricle is normal in size with concentric remodeling and normal systolic function.  · The estimated ejection fraction is 60%.  · Grade I left ventricular diastolic dysfunction.  · Normal right ventricular size with normal right ventricular systolic function.  · Trivial to mild mitral regurgitation.  · Normal central venous pressure (3 mmHg).  · The estimated PA systolic pressure is 30 mmHg.      Most Recent Nuclear Stress Test Results  Results for orders placed during the hospital encounter of 07/19/23    Nuclear Stress - Cardiology Interpreted    Interpretation Summary    Normal myocardial perfusion scan. There is no evidence of myocardial ischemia or infarction.    The gated perfusion images showed an ejection fraction of 72% post stress.    There is normal wall motion post stress.    LV cavity size is normal at stress.    The ECG portion of the study is uninterpretable.    The patient reported no chest pain during the stress test.    There were no arrhythmias during stress.    This is a low risk study.      Most Recent Cardiac PET Stress Test Results  No results found for this or any previous visit.      Most Recent Cardiovascular Angiogram results  No results found for this or any previous visit.      Other Most Recent Cardiology Results  Results for orders placed in visit on  11/17/23    Cardiac device check - Remote    Narrative  Battery and Leads (BL)  Auto-threshold measurement unavailable or programmed off on lead(s)  Normal parameters noted on battery and lead(s)    Presenting Rhythm (OR)  Atrial Pacing-Ventricular Sensing (AP-VS)    Arrhythmic events (AE)  No new arrhythmic events in monitoring period    Cardiovascular Physiologic Parameters (CPP)  No abnormalities in physiologic parameters identified    Transmission Information (TI)  Device Summary Report    Follow Up (FU)  Continue remote monitoring with quarterly reporting    Additional Comments  Pacemaker Report  Monitoring period:6/27/23-9/26/23    Battery/Lead Status:70%    Ap: 94%  : 12%      Labs reviewed    Assessment:       1. Second degree heart block    2. PAF (paroxysmal atrial fibrillation)    3. Atherosclerosis of aorta         Plan:     Continue:  ASA and Statin  Regular exercise program  Weight loss  Low cholesterol diet    Follow-up in 9 months with me or Nati Sanders

## 2024-01-25 NOTE — TELEPHONE ENCOUNTER
----- Message from Manoj Joseph sent at 2024 10:18 AM CST -----  Regardinm f/u from recall  Contact: wife at [phone] // callback   Type:  Dept Book Appointment Request    Caller is requesting an appointment.      Name of Caller:  wife // j carlos    When is the first available appointment?  Dept book    Reason for Dept Book:  6m f/u from recall for memory    Best Call Back Number:  [phone]    Additional Information: PLEASE DO NOT CALL TODAY  AS THEY WILL NOT BE AVAILABLE. PLEASE CALL TOMORROW  AT 09:00.

## 2024-01-30 ENCOUNTER — TELEPHONE (OUTPATIENT)
Dept: NEUROLOGY | Facility: CLINIC | Age: 79
End: 2024-01-30
Payer: MEDICARE

## 2024-01-30 NOTE — TELEPHONE ENCOUNTER
----- Message from Jules Spears sent at 1/30/2024  4:07 PM CST -----  Type: Need Medical Advice   Who Called: patient wife Modesta Mccall callback number: [phone]  Symptoms: Memory   Additional Information: wife of patient called to schedule a f/u visit, wife stated more test were to be scheduled from there last Dr visit   Please call to further assist, Thanks.

## 2024-01-30 NOTE — TELEPHONE ENCOUNTER
Spoke with patients wife and informed Dr. Thomson ordered neuropsych testing and the wait time is 6 months. Advised once that is scheduled to call back to schedule follow up. Patients wife voiced understanding.

## 2024-02-15 ENCOUNTER — CLINICAL SUPPORT (OUTPATIENT)
Dept: CARDIOLOGY | Facility: HOSPITAL | Age: 79
End: 2024-02-15

## 2024-02-15 DIAGNOSIS — Z95.0 PRESENCE OF CARDIAC PACEMAKER: ICD-10-CM

## 2024-02-16 ENCOUNTER — HOSPITAL ENCOUNTER (OUTPATIENT)
Dept: CARDIOLOGY | Facility: HOSPITAL | Age: 79
Discharge: HOME | End: 2024-02-16
Attending: INTERNAL MEDICINE

## 2024-02-16 PROCEDURE — 93296 REM INTERROG EVL PM/IDS: CPT | Mod: PO | Performed by: INTERNAL MEDICINE

## 2024-02-16 PROCEDURE — 93294 REM INTERROG EVL PM/LDLS PM: CPT | Mod: ,,, | Performed by: INTERNAL MEDICINE

## 2024-02-28 ENCOUNTER — OFFICE VISIT (OUTPATIENT)
Dept: FAMILY MEDICINE | Facility: CLINIC | Age: 79
End: 2024-02-28
Payer: MEDICARE

## 2024-02-28 VITALS
WEIGHT: 159.63 LBS | TEMPERATURE: 98 F | BODY MASS INDEX: 26.6 KG/M2 | HEIGHT: 65 IN | HEART RATE: 59 BPM | DIASTOLIC BLOOD PRESSURE: 61 MMHG | SYSTOLIC BLOOD PRESSURE: 110 MMHG

## 2024-02-28 DIAGNOSIS — N40.1 BENIGN PROSTATIC HYPERPLASIA WITH LOWER URINARY TRACT SYMPTOMS, SYMPTOM DETAILS UNSPECIFIED: ICD-10-CM

## 2024-02-28 DIAGNOSIS — I95.1 ORTHOSTATIC HYPOTENSION: Primary | ICD-10-CM

## 2024-02-28 DIAGNOSIS — I44.1 SECOND DEGREE HEART BLOCK: ICD-10-CM

## 2024-02-28 DIAGNOSIS — Z95.0 CARDIAC PACEMAKER IN SITU: ICD-10-CM

## 2024-02-28 DIAGNOSIS — N18.31 CHRONIC KIDNEY DISEASE, STAGE 3A: ICD-10-CM

## 2024-02-28 DIAGNOSIS — D12.6 TUBULAR ADENOMA OF COLON: ICD-10-CM

## 2024-02-28 PROCEDURE — 99214 OFFICE O/P EST MOD 30 MIN: CPT | Mod: S$GLB,,, | Performed by: FAMILY MEDICINE

## 2024-02-28 PROCEDURE — 99999 PR PBB SHADOW E&M-EST. PATIENT-LVL IV: CPT | Mod: PBBFAC,,, | Performed by: FAMILY MEDICINE

## 2024-02-29 NOTE — PROGRESS NOTES
Patient felt lightheaded yesterday and states blood pressure was 96/48.  This was when he was carrying in groceries after going grocery shopping.  Previously has had episodes of lightheadedness after standing or with lifting something.  He is had this in the past and felt to have orthostasis.  Sees cardiology regularly.  He had recent echocardiogram without severe findings.  Recent nuclear stress test negative.  He does have a pacemaker.  He denies palpitations.  Prior history of possible small stroke on imaging which was asymptomatic otherwise.  He does take tamsulosin every 3rd day for BPH which we had recommended discontinuing last fall.  Apparently he is still taking it..  Due for colonoscopy for surveillance previous adenomatous colon polyps.  Stage 3 chronic kidney disease asymptomatic.  Patient did see neurologist recently regarding some memory issues.  He is pending neuropsych testing.  They also had some concerns regarding possible bradykinesia.    Alexander was seen today for annual exam.    Diagnoses and all orders for this visit:    Orthostatic hypotension    Benign prostatic hyperplasia with lower urinary tract symptoms, symptom details unspecified    Tubular adenoma of colon    Cardiac pacemaker in situ    Second degree heart block    Chronic kidney disease, stage 3a    He will stop the tamsulosin to see if this helps with the orthostasis.  Let us know if he has issues with his urine.  Keep scheduled follow-up Neurology.  I would have some concerns regarding autonomic dysfunction or possibly developing Parkinson's..  Continue follow-up with his cardiologist.    Follow-up with me after he sees Neurology.  Make sure he stays hydrated.  Take his time getting up and down.              Past Medical History:  Past Medical History:   Diagnosis Date    Acute idiopathic gout of multiple sites 02/23/2022    Atherosclerosis of aorta 04/13/2021    He has atherosclerosis noted on imaging in the past and no tx is  needed at this time.    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 This is followed by cardiology now.    Cardiac pacemaker in situ 04/13/2021    CKD (chronic kidney disease) stage 3, GFR 30-59 ml/min 12/25/2017    Alexander Patel has an Estimated Glumerular Filtration Rate (EGFR) between 30 and 59 consistent with the definition of chronic kidney disease stage 3.  eGFR if non   Date Value Ref Range Status  04/23/2018 53.8 (A) >60 mL/min/1.73 m^2 Final    Comment:    Calculation used to obtain the estimated glomerular filtration rate (eGFR) is the CKD-EPI equation.       Lab Results  Component     Colon polyp 10/08/2015    Diverticulosis of large intestine without hemorrhage 10/08/2015    Essential hypertension 08/28/2015    Gout 2012    Hyperlipidemia LDL goal < 130     Mild obstructive sleep apnea 02/16/2023    Orthostatic hypotension 07/14/2020    PAF (paroxysmal atrial fibrillation) 12/30/2020    He had an episode of afib in the past and he has not required anticoagulation and is not needing rate control.  He has not had stroke symptoms.    Second degree heart block 08/23/2019    Tubular adenoma of colon 02/25/2021     Past Surgical History:   Procedure Laterality Date    A-V CARDIAC PACEMAKER INSERTION N/A 8/23/2019    Procedure: INSERTION, CARDIAC PACEMAKER, DUAL CHAMBER;  Surgeon: Manas Meade MD;  Location: [facility];  Service: Cardiology;  Laterality: N/A;    COLONOSCOPY N/A 10/8/2015    Procedure: COLONOSCOPY;  Surgeon: Avinash Lamb MD;  Location: [facility];  Service: Endoscopy;  Laterality: N/A;    COLONOSCOPY N/A 2/25/2021    Procedure: COLONOSCOPY;  Surgeon: Avinash Lamb MD;  Location: [facility];  Service: Endoscopy;  Laterality: N/A;    FRACTURE SURGERY      hand     Review of patient's allergies indicates:   Allergen Reactions    No known drug allergies      Current Outpatient Medications on File  "Prior to Visit   Medication Sig Dispense Refill    allopurinoL (ZYLOPRIM) 100 MG tablet Take 1.5 tablets (150 mg total) by mouth once daily. 135 tablet 3    aspirin 81 MG chewable tablet Take 81 mg by mouth once daily. Every day      cyanocobalamin (VITAMIN B-12) 1000 MCG tablet Take 1 tablet (1,000 mcg total) by mouth once daily.      GLUCOSAMINE HCL/CHONDR LAM A NA (OSTEO BI-FLEX ORAL) Take 2 tablets by mouth once daily.       multivit-min/FA/lycopen/lutein (CENTRUM SILVER MEN ORAL) Take 1 tablet by mouth once daily.      [DISCONTINUED] tamsulosin (FLOMAX) 0.4 mg Cap TAKE 1 CAPSULE BY MOUTH  EVERY THIRD DAY 30 capsule 3    rosuvastatin (CRESTOR) 10 MG tablet Take 1 tablet (10 mg total) by mouth once daily. 90 tablet 3     No current facility-administered medications on file prior to visit.     Social History     Socioeconomic History    Marital status:      Spouse name: Modesta    Number of children: 2   Occupational History    Occupation: retired   Tobacco Use    Smoking status: Never    Smokeless tobacco: Former     Types: Chew   Substance and Sexual Activity    Alcohol use: Yes     Comment: occasional    Drug use: No    Sexual activity: Yes     Family History   Problem Relation Age of Onset    Heart disease Mother     Lymphoma Mother     Colon polyps Mother     Alcohol abuse Father     Emphysema Father     Lymphoma Brother            ROS:  GENERAL: No fever, chills,  or significant weight changes.   CARDIOVASCULAR: Denies chest pain, PND, orthopnea or reduced exercise tolerance.  ABDOMEN: Appetite fine. Denies diarrhea, abdominal pain, hematemesis or blood in stool.  URINARY: No flank pain, dysuria or hematuria.        Lying down standing 1 minute standing 3 minute  Vitals:    02/28/24 1057 02/28/24 1158 02/28/24 1159 02/28/24 1200   BP: (!) 151/86 (!) 167/87 (!) 87/55 110/61   Pulse: (!) 59 60 60 (!) 59   Temp: 97.5 °F (36.4 °C)      TempSrc: Temporal      Weight: 72.4 kg (159 lb 9.6 oz)      Height: 5' 5" " (1.651 m)        Wt Readings from Last 3 Encounters:   02/28/24 72.4 kg (159 lb 9.6 oz)   01/25/24 73.2 kg (161 lb 6 oz)   09/18/23 73.4 kg (161 lb 13.1 oz)       OBJECTIVE:   APPEARANCE: Well nourished, well developed, in no acute distress.    HEAD: Normocephalic.  Atraumatic.  No sinus tenderness.  EYES:   Right eye: Pupil reactive.  Conjunctiva clear.    Left eye: Pupil reactive.  Conjunctiva clear.  EOMI.    EARS: TM's intact. Light reflex normal. No retraction or perforation.    NOSE:  clear.  MOUTH & THROAT:  No pharyngeal erythema or exudate. No lesions.  NECK: Supple. No bruits.  No JVD.  No cervical lymphadenopathy.  No thyromegaly.    CHEST: Breath sounds clear bilaterally.  Normal respiratory effort  CARDIOVASCULAR: Normal rate.  Regular rhythm.  No murmurs.  No rub.  No gallops.  ABDOMEN: Bowel sounds normal.  Soft.  No tenderness.  No organomegaly.  PERIPHERAL VASCULAR: No cyanosis.  No clubbing.  No edema.  NEUROLOGIC: No focal findings.  MENTAL STATUS: Alert.  Oriented x 3.

## 2024-03-13 ENCOUNTER — OFFICE VISIT (OUTPATIENT)
Dept: NEUROLOGY | Facility: CLINIC | Age: 79
End: 2024-03-13
Payer: MEDICARE

## 2024-03-13 DIAGNOSIS — G31.84 MILD NEUROCOGNITIVE DISORDER: ICD-10-CM

## 2024-03-13 DIAGNOSIS — R41.3 OTHER AMNESIA: ICD-10-CM

## 2024-03-13 DIAGNOSIS — E53.8 B12 DEFICIENCY: ICD-10-CM

## 2024-03-13 DIAGNOSIS — I63.89 OTHER CEREBRAL INFARCTION: ICD-10-CM

## 2024-03-13 PROCEDURE — 96138 PSYCL/NRPSYC TECH 1ST: CPT | Mod: S$GLB,,, | Performed by: PSYCHIATRY & NEUROLOGY

## 2024-03-13 PROCEDURE — 96133 NRPSYC TST EVAL PHYS/QHP EA: CPT | Mod: S$GLB,,, | Performed by: PSYCHIATRY & NEUROLOGY

## 2024-03-13 PROCEDURE — 96139 PSYCL/NRPSYC TST TECH EA: CPT | Mod: S$GLB,,, | Performed by: PSYCHIATRY & NEUROLOGY

## 2024-03-13 PROCEDURE — 96132 NRPSYC TST EVAL PHYS/QHP 1ST: CPT | Mod: S$GLB,,, | Performed by: PSYCHIATRY & NEUROLOGY

## 2024-03-13 PROCEDURE — 96116 NUBHVL XM PHYS/QHP 1ST HR: CPT | Mod: S$GLB,,, | Performed by: PSYCHIATRY & NEUROLOGY

## 2024-03-13 NOTE — PROGRESS NOTES
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 insight by recognizing when he made mistakes and attempted to correct himself. Scores on stand-alone and embedded performance validity measures were within normal limits.  The current results, therefore, are likely a valid reflection of the patient's current functioning.     PROCEDURES/TESTS ADMINISTERED:  In addition to performing a review of pertinent medical records, reviewing limits to confidentiality, conducting a clinical interview, and explaining procedures, the following measures were administered: MSVT; Test of Premorbid Functioning; CITH; DCT; Herbie Cognitive Assessment (MoCA); Neuropsychological Assessment Battery (NAB), selected subtests; Verbal fluency tests (FAS & animal naming; Rosana et al., 2004 norms); Trail Making Test, parts A and B (Rosana et al., 2004 norms); Grooved Pegboard Test (Rosana et al., 2004 norms); Jarod Complex Figure Test (RCFT, copy); Repeatable Battery for the Assessment of Neuropsychological Status (RBANS, Form A); Frontal Assessment Battery (POPPY); Geriatric Depression Scale (GDS-30); Generalized Anxiety Disorder Questionnaire (ISMA-7). Manual norms were used unless otherwise indicated.     TEST RESULTS    PREMORBID FUNCTIONING Raw Score Type of Standardized Score Standardized Score " Percentile/CP Descriptor   TOPF simple dem. eFSIQ -  68 Average   COGNITIVE SCREENING Raw Score Type of Standardized Score Standardized Score Percentile/CP Descriptor   MoCA 18 - - - Impaired   Orientation - Place 2/2 - - - -   Orientation - Date 4/4 - - - -   RBANS         Immediate Memory - SS 90 25 Average   VS/Construction - SS 92 30 Average   Language - SS 92 30 Average   Attention - SS 68 2 Below Average   Delayed Memory -  68 Average   Total Scale - SS 85 16 Low Average   EI 2 - - - -   LANGUAGE FUNCTIONING Raw Score Type of Standardized Score Standardized Score Percentile/CP Descriptor   RBANS Naming 10 ss - >75 High Average   RBANS Semantic Fluency 13 ss 6 9 Low Average   TOPF Word Reading 23 SS 87 19 Low Average   NAB Naming 30 Tscore 62 88 High Average   NAB Naming Percent Correct After Semantic Cuing 0 - - 38 WNL   NAB Naming Percent Correct After Phonemic Cuing 100 - - 100 WNL   FAS 20 Tscore 34 5 Below Average   Animal Naming 15 Tscore 47 38 Average   VISUOSPATIAL FUNCTIONING Raw Score Type of Standardized Score Standardized Score Percentile/CP Descriptor   RBANS Line Orientation  12 ss - 3-9 Below Average   RBANS Figure Copy 19 ss 12 75 High Average   RCFT Copy 28 - - >16 WNL   RCFT Time to Copy 436 - - >16 WNL   LEARNING & MEMORY Raw Score Type of Standardized Score Standardized Score Percentile/CP Descriptor   RBANS         List Learning 19 ss 7 16 Low Average   List Recall 3 ss - 26-50 Average   List Recognition 20 ss - 51-75 Average   Story Memory 16 ss 10 50 Average   Story Recall 7 ss 9 37 Average   Story Recognition  10 - - 53-67 Average   Figure Recall 17 ss 14 91 Above Average   Figure Recognition 1 - - - WNL   CITH 10 - - - -   MSVT          - - - -    - - - -   Cons 100 - - - -   PA 80 - - - -   FR 80 - - - -   ATTENTION/WORKING MEMORY Raw Score Type of Standardized Score Standardized Score Percentile/CP Descriptor   RBANS Digit Span  7 ss 6 9 Low Average   MENTAL  PROCESSING SPEED Raw Score Type of Standardized Score Standardized Score Percentile/CP Descriptor   RBANS Coding 19 ss 4 2 Below Average   TMT A  89 Tscore 24 1 Exceptionally Low    TMT A errors 1 - - - -   DCT 17 - - - -   EXECUTIVE FUNCTIONING Raw Score Type of Standardized Score Standardized Score Percentile/CP Descriptor   TMT B 307 Tscore 21 0 Exceptionally Low    TMT B errors 4 - - - -   POPPY 13 zscore -1.3 10 Low Average   FRONTOMOTOR  Raw Score Type of Standardized Score Standardized Score Percentile/CP Descriptor   GPT  Tscore 32 4 Below Average   GPD  Tscore 32 4 Below Average   MOOD & PERSONALITY Raw Score Type of Standardized Score Standardized Score Percentile/CP Descriptor   GDS-30 4 - - - WNL   ISMA-7 0 - - - WNL   ss = scaled score (mean = 10, SD = 3); SS = standard score (mean = 100, SD = 15); Tscore mean = 50, SD = 10; zscore (mean = 0.00, SD = 1)     BILLING  Service Description CPT Code Minutes Units   Psychiatric diagnostic evaluation by physician [id number]  0   Neurobehavioral status exam by physician [id number] 45 1   Each additional hour by physician [id number]  0   Test Evaluation Services --  --   Neuropsychological testing evaluation services by physician [id number] [phone] 1   Each additional hour by physician [id number]  2   Test Administration and Scoring --  --   Psychological or neuropsychological test administration and scoring by physician [id number]  0   Each additional 30 minutes by physician [id number]  0   Psychological or neuropsychological test administration and scoring by technician [id number] [phone] 0   Each additional 30 minutes by technician [id number]  0

## 2024-03-27 ENCOUNTER — OFFICE VISIT (OUTPATIENT)
Dept: NEUROLOGY | Facility: CLINIC | Age: 79
End: 2024-03-27
Payer: MEDICARE

## 2024-03-27 DIAGNOSIS — N40.0 BENIGN PROSTATIC HYPERPLASIA, UNSPECIFIED WHETHER LOWER URINARY TRACT SYMPTOMS PRESENT: ICD-10-CM

## 2024-03-27 DIAGNOSIS — G31.84 MILD NEUROCOGNITIVE DISORDER: Primary | ICD-10-CM

## 2024-03-27 PROCEDURE — 99499 UNLISTED E&M SERVICE: CPT | Mod: S$GLB,,, | Performed by: PSYCHIATRY & NEUROLOGY

## 2024-03-27 NOTE — PROGRESS NOTES
NEUROPSYCHOLOGY FEEDBACK    Referral Information  Name: Alexander Patel  MRN: [id number]  : 1945  Age: 79 y.o.  Race: White  Gender: male  Referring Provider: Sheron Thomson Md  [address] [place] [address]  The chief complaint leading to consultation/medical necessity is: Feedback from neuropsychological evaluation.  Billin minutes; 25206 attached to testing visit on 3/13/2024  Consent/Emergency Plan: The patient expressed an understanding of the purpose of the evaluation and consented to all procedures. I informed the patient of limits to confidentiality and discussed an emergency plan. He was accompanied by his wife.    NOTE   Alexander Patel attended a feedback session today.  We discussed the results of the neuropsychological evaluation.  I provided Mr. Patel with a copy of the evaluation report and gave time to discuss questions and concerns.    Maya Gil, Ph.D., ABPP  Board Certified in Clinical Neuropsychology  [facility] - Department of Neurology

## 2024-03-28 RX ORDER — TAMSULOSIN HYDROCHLORIDE 0.4 MG/1
CAPSULE ORAL
Qty: 30 CAPSULE | Refills: 3 | OUTPATIENT
Start: 2024-03-28

## 2024-03-28 NOTE — TELEPHONE ENCOUNTER
Refill Decision Note   Alexandre Patel  is requesting a refill authorization.  Brief Assessment and Rationale for Refill:  Quick Discontinue     Medication Therapy Plan:  Med d/c on 02/28/24 by PCP; [facility]      Comments:     Note composed:6:11 AM 03/28/2024

## 2024-03-28 NOTE — TELEPHONE ENCOUNTER
No care due was identified.  [facility] Embedded Care Due Messages. Reference number: [id number].   3/27/2024 9:03:53 PM CDT

## 2024-04-05 DIAGNOSIS — M10.072 ACUTE IDIOPATHIC GOUT OF LEFT FOOT: ICD-10-CM

## 2024-04-05 DIAGNOSIS — E79.0 HYPERURICEMIA: ICD-10-CM

## 2024-04-05 RX ORDER — ALLOPURINOL 100 MG/1
150 TABLET ORAL
Qty: 135 TABLET | Refills: 2 | Status: SHIPPED | OUTPATIENT
Start: 2024-04-05

## 2024-04-05 NOTE — TELEPHONE ENCOUNTER
No care due was identified.  [facility] Embedded Care Due Messages. Reference number: [id number].   4/05/2024 12:40:06 AM CDT

## 2024-04-06 NOTE — TELEPHONE ENCOUNTER
Refill Decision Note   Alexander Patel  is requesting a refill authorization.  Brief Assessment and Rationale for Refill:  Approve     Medication Therapy Plan:       Medication Reconciliation Completed: No   Comments:     No Care Gaps recommended.     Note composed:9:20 PM 04/05/2024

## 2024-04-20 NOTE — PROGRESS NOTES
The the creatinine is currently stable although abnormal.  It has not really changed in the last year.  Please follow-up with a nephrologist. .

## 2024-05-02 LAB
OHS CV AF BURDEN PERCENT: < 1
OHS CV DC REMOTE DEVICE TYPE: NORMAL
OHS CV RV PACING PERCENT: 11 %

## 2024-05-16 ENCOUNTER — CLINICAL SUPPORT (OUTPATIENT)
Dept: CARDIOLOGY | Facility: HOSPITAL | Age: 79
End: 2024-05-16
Payer: MEDICARE

## 2024-05-16 DIAGNOSIS — Z95.0 PRESENCE OF CARDIAC PACEMAKER: ICD-10-CM

## 2024-05-17 ENCOUNTER — TELEPHONE (OUTPATIENT)
Dept: CARDIOLOGY | Facility: HOSPITAL | Age: 79
End: 2024-05-17
Payer: MEDICARE

## 2024-05-17 ENCOUNTER — HOSPITAL ENCOUNTER (OUTPATIENT)
Dept: CARDIOLOGY | Facility: HOSPITAL | Age: 79
Discharge: HOME | End: 2024-05-17
Attending: INTERNAL MEDICINE

## 2024-05-17 PROCEDURE — 93294 REM INTERROG EVL PM/LDLS PM: CPT | Mod: ,,, | Performed by: INTERNAL MEDICINE

## 2024-05-17 PROCEDURE — 93296 REM INTERROG EVL PM/IDS: CPT | Mod: PO | Performed by: INTERNAL MEDICINE

## 2024-05-17 NOTE — TELEPHONE ENCOUNTER
AF noted during PPM device remote check:    Overall burden: 0% since 6/24/24    Max duration seen: 1 hr. 42 mins. On 5/8                      Most recent episode: 5/8    Ventricular rates:   Controlled       Anticoagulation status:  none    Patient symptoms: pt. Denies any symptoms    Meds:  Aspirin 81 mg       Message sent to Dr. Meade for review

## 2024-05-20 ENCOUNTER — PATIENT MESSAGE (OUTPATIENT)
Dept: CARDIOLOGY | Facility: HOSPITAL | Age: 79
End: 2024-05-20
Payer: MEDICARE

## 2024-05-27 LAB
OHS CV AF BURDEN PERCENT: < 1
OHS CV DC REMOTE DEVICE TYPE: NORMAL
OHS CV ICD SHOCK: NO
OHS CV RV PACING PERCENT: 14 %

## 2024-06-24 ENCOUNTER — TELEPHONE (OUTPATIENT)
Dept: UROLOGY | Facility: CLINIC | Age: 79
End: 2024-06-24
Payer: MEDICARE

## 2024-06-24 ENCOUNTER — HOSPITAL ENCOUNTER (OUTPATIENT)
Dept: CARDIOLOGY | Facility: HOSPITAL | Age: 79
Discharge: HOME | End: 2024-06-24
Attending: INTERNAL MEDICINE
Payer: MEDICARE

## 2024-06-24 DIAGNOSIS — Z95.0 CARDIAC PACEMAKER IN SITU: ICD-10-CM

## 2024-06-24 DIAGNOSIS — I48.0 PAF (PAROXYSMAL ATRIAL FIBRILLATION): ICD-10-CM

## 2024-06-24 PROCEDURE — 93280 PM DEVICE PROGR EVAL DUAL: CPT | Mod: PO

## 2024-06-24 NOTE — TELEPHONE ENCOUNTER
----- Message from Jessica Cartagena sent at 6/24/2024 10:40 AM CDT -----  Would like to milana an appt, he's having urine flow issues, please call  [phone] to [facility], wife stated to leave a message if you can't get a hold of them . Thank you

## 2024-08-16 ENCOUNTER — HOSPITAL ENCOUNTER (OUTPATIENT)
Dept: CARDIOLOGY | Facility: HOSPITAL | Age: 79
Discharge: HOME | End: 2024-08-16
Attending: INTERNAL MEDICINE

## 2024-08-16 ENCOUNTER — CLINICAL SUPPORT (OUTPATIENT)
Dept: CARDIOLOGY | Facility: HOSPITAL | Age: 79
End: 2024-08-16
Payer: MEDICARE

## 2024-08-16 DIAGNOSIS — I44.1 ATRIOVENTRICULAR BLOCK, SECOND DEGREE: ICD-10-CM

## 2024-08-16 PROCEDURE — 93294 REM INTERROG EVL PM/LDLS PM: CPT | Mod: ,,, | Performed by: INTERNAL MEDICINE

## 2024-08-16 PROCEDURE — 93296 REM INTERROG EVL PM/IDS: CPT | Mod: PO | Performed by: INTERNAL MEDICINE

## 2024-08-19 LAB
OHS CV AF BURDEN PERCENT: < 1
OHS CV DC REMOTE DEVICE TYPE: NORMAL
OHS CV RV PACING PERCENT: 33 %

## 2024-08-28 ENCOUNTER — OFFICE VISIT (OUTPATIENT)
Dept: CARDIOLOGY | Facility: CLINIC | Age: 79
End: 2024-08-28
Payer: MEDICARE

## 2024-08-28 VITALS
DIASTOLIC BLOOD PRESSURE: 84 MMHG | HEIGHT: 64 IN | SYSTOLIC BLOOD PRESSURE: 142 MMHG | HEART RATE: 59 BPM | WEIGHT: 155 LBS | BODY MASS INDEX: 26.46 KG/M2

## 2024-08-28 DIAGNOSIS — I10 ESSENTIAL HYPERTENSION: ICD-10-CM

## 2024-08-28 DIAGNOSIS — Z95.0 CARDIAC PACEMAKER IN SITU: ICD-10-CM

## 2024-08-28 DIAGNOSIS — I48.0 PAF (PAROXYSMAL ATRIAL FIBRILLATION): ICD-10-CM

## 2024-08-28 DIAGNOSIS — E78.5 HYPERLIPIDEMIA WITH TARGET LDL LESS THAN 130: ICD-10-CM

## 2024-08-28 DIAGNOSIS — I44.1 SECOND DEGREE HEART BLOCK: Primary | ICD-10-CM

## 2024-08-28 PROCEDURE — 1126F AMNT PAIN NOTED NONE PRSNT: CPT | Mod: CPTII,S$GLB,, | Performed by: INTERNAL MEDICINE

## 2024-08-28 PROCEDURE — 99214 OFFICE O/P EST MOD 30 MIN: CPT | Mod: S$GLB,,, | Performed by: INTERNAL MEDICINE

## 2024-08-28 PROCEDURE — 1160F RVW MEDS BY RX/DR IN RCRD: CPT | Mod: CPTII,S$GLB,, | Performed by: INTERNAL MEDICINE

## 2024-08-28 PROCEDURE — 99999 PR PBB SHADOW E&M-EST. PATIENT-LVL III: CPT | Mod: PBBFAC,,, | Performed by: INTERNAL MEDICINE

## 2024-08-28 PROCEDURE — 3079F DIAST BP 80-89 MM HG: CPT | Mod: CPTII,S$GLB,, | Performed by: INTERNAL MEDICINE

## 2024-08-28 PROCEDURE — 1159F MED LIST DOCD IN RCRD: CPT | Mod: CPTII,S$GLB,, | Performed by: INTERNAL MEDICINE

## 2024-08-28 PROCEDURE — 1101F PT FALLS ASSESS-DOCD LE1/YR: CPT | Mod: CPTII,S$GLB,, | Performed by: INTERNAL MEDICINE

## 2024-08-28 PROCEDURE — 3288F FALL RISK ASSESSMENT DOCD: CPT | Mod: CPTII,S$GLB,, | Performed by: INTERNAL MEDICINE

## 2024-08-28 PROCEDURE — 3077F SYST BP >= 140 MM HG: CPT | Mod: CPTII,S$GLB,, | Performed by: INTERNAL MEDICINE

## 2024-08-28 PROCEDURE — 1157F ADVNC CARE PLAN IN RCRD: CPT | Mod: CPTII,S$GLB,, | Performed by: INTERNAL MEDICINE

## 2024-08-28 NOTE — PROGRESS NOTES
Subjective:    Patient ID:  Alexander Patel is a 79 y.o. male who presents for follow-up of atrial fibrillation, sick sinus syndrome    HPI  He comes for follow up with no major problems, no chest pain, no shortness of breath.  Low BP readings lately associated to weakness    Review of Systems   Constitutional: Negative for decreased appetite, malaise/fatigue, weight gain and weight loss.   Cardiovascular:  Negative for chest pain, dyspnea on exertion, leg swelling, palpitations and syncope.   Respiratory:  Negative for cough and shortness of breath.    Gastrointestinal: Negative.    Neurological:  Negative for weakness.   All other systems reviewed and are negative.     Objective:      Physical Exam  Vitals and nursing note reviewed.   Constitutional:       Appearance: Normal appearance. He is well-developed.   HENT:      Head: Normocephalic.   Eyes:      Pupils: Pupils are equal, round, and reactive to light.   Neck:      Thyroid: No thyromegaly.      Vascular: No carotid bruit or JVD.   Cardiovascular:      Rate and Rhythm: Normal rate and regular rhythm.      Chest Wall: PMI is not displaced.      Pulses: Normal pulses and intact distal pulses.      Heart sounds: Normal heart sounds. No murmur heard.     No gallop.   Pulmonary:      Effort: Pulmonary effort is normal.      Breath sounds: Normal breath sounds.   Abdominal:      Palpations: Abdomen is soft. There is no mass.      Tenderness: There is no abdominal tenderness.   Musculoskeletal:         General: Normal range of motion.      Cervical back: Normal range of motion and neck supple.   Skin:     General: Skin is warm.   Neurological:      Mental Status: He is alert and oriented to person, place, and time.      Sensory: No sensory deficit.      Deep Tendon Reflexes: Reflexes are normal and symmetric.         Most Recent EKG Results  Results for orders placed or performed in visit on 06/30/22   EKG 12-lead    Collection Time: 06/30/22  8:34 AM    Narrative     Test Reason : I95.9,R42,    Vent. Rate : 060 BPM     Atrial Rate : 060 BPM     P-R Int : 290 ms          QRS Dur : 090 ms      QT Int : 400 ms       P-R-T Axes : 000 -27 -04 degrees     QTc Int : 400 ms    Atrial-paced rhythm with prolonged AV conduction  Abnormal ECG  When compared with ECG of 02-JUL-2020 16:50,  No significant change was found  Confirmed by SEA MIRANDA MD ([id number]) on 7/1/2022 1:23:36 PM    Referred By: KHLOE SALCEDO           Confirmed By:SEA MIRANDA MD       Most Recent Echocardiogram Results  Results for orders placed in visit on 07/19/23    Echo    Interpretation Summary  · The left ventricle is normal in size with concentric remodeling and normal systolic function.  · The estimated ejection fraction is 60%.  · Grade I left ventricular diastolic dysfunction.  · Normal right ventricular size with normal right ventricular systolic function.  · Trivial to mild mitral regurgitation.  · Normal central venous pressure (3 mmHg).  · The estimated PA systolic pressure is 30 mmHg.      Most Recent Nuclear Stress Test Results  Results for orders placed during the hospital encounter of 07/19/23    Nuclear Stress - Cardiology Interpreted    Interpretation Summary    Normal myocardial perfusion scan. There is no evidence of myocardial ischemia or infarction.    The gated perfusion images showed an ejection fraction of 72% post stress.    There is normal wall motion post stress.    LV cavity size is normal at stress.    The ECG portion of the study is uninterpretable.    The patient reported no chest pain during the stress test.    There were no arrhythmias during stress.    This is a low risk study.      Most Recent Cardiac PET Stress Test Results  No results found for this or any previous visit.      Most Recent Cardiovascular Angiogram results  No results found for this or any previous visit.      Other Most Recent Cardiology Results  Results for orders placed in visit on 08/16/24    Cardiac device  check - Remote      Labs reviewed    Assessment:       1. Second degree heart block    2. PAF (paroxysmal atrial fibrillation)    3. Cardiac pacemaker in situ    4. Essential hypertension    5. Hyperlipidemia with target LDL less than 130         Plan:   Increase salt intake and support hose  Continue:  ASA  Regular exercise program  Weight loss  Low cholesterol diet    6 m f/u

## 2024-09-11 RX ORDER — ROSUVASTATIN CALCIUM 10 MG/1
10 TABLET, COATED ORAL
Qty: 90 TABLET | Refills: 1 | Status: SHIPPED | OUTPATIENT
Start: 2024-09-11

## 2024-09-11 NOTE — TELEPHONE ENCOUNTER
Provider Staff:  Action required for this patient    Requires labs      Please see care gap opportunities below in Care Due Message.    Thanks!  [facility]     Appointments      Date Provider   Last Visit   2/28/2024 Jai Mcmahon MD   Next Visit   Visit date not found Jai Mcmahon MD     Refill Decision Note   Alexander Patel  is requesting a refill authorization.  Brief Assessment and Rationale for Refill:  Approve     Medication Therapy Plan:         Comments:     Note composed:10:51 AM 09/11/2024

## 2024-09-11 NOTE — TELEPHONE ENCOUNTER
Care Due:                  Date            Visit Type   Department     Provider  --------------------------------------------------------------------------------                                MYCHART                              ANNUAL                              CHECKUP/PHY  [facility] FAMILY  Last Visit: 02-      [facility]       Jai Mcmahon  Next Visit: None Scheduled  None         None Found                                                            Last  Test          Frequency    Reason                     Performed    Due Date  --------------------------------------------------------------------------------    CBC.........  12 months..  allopurinoL..............  12- 12-    CMP.........  12 months..  allopurinoL, rosuvastatin  12- 12-    Lipid Panel.  12 months..  rosuvastatin.............  12- 12-    Uric Acid...  12 months..  allopurinoL..............  12-   12-    [facility] Embedded Care Due Messages. Reference number: [id number].   9/11/2024 12:15:42 AM CDT

## 2024-09-16 ENCOUNTER — TELEPHONE (OUTPATIENT)
Dept: FAMILY MEDICINE | Facility: CLINIC | Age: 79
End: 2024-09-16

## 2024-09-16 ENCOUNTER — HOSPITAL ENCOUNTER (OUTPATIENT)
Dept: RADIOLOGY | Facility: HOSPITAL | Age: 79
Discharge: HOME | End: 2024-09-16
Attending: FAMILY MEDICINE
Payer: MEDICARE

## 2024-09-16 ENCOUNTER — PATIENT MESSAGE (OUTPATIENT)
Dept: FAMILY MEDICINE | Facility: CLINIC | Age: 79
End: 2024-09-16

## 2024-09-16 ENCOUNTER — OFFICE VISIT (OUTPATIENT)
Dept: FAMILY MEDICINE | Facility: CLINIC | Age: 79
End: 2024-09-16
Payer: MEDICARE

## 2024-09-16 VITALS
HEART RATE: 60 BPM | HEIGHT: 64 IN | BODY MASS INDEX: 26.36 KG/M2 | DIASTOLIC BLOOD PRESSURE: 76 MMHG | WEIGHT: 154.38 LBS | TEMPERATURE: 98 F | SYSTOLIC BLOOD PRESSURE: 148 MMHG

## 2024-09-16 DIAGNOSIS — G89.29 CHRONIC RIGHT-SIDED LOW BACK PAIN WITHOUT SCIATICA: ICD-10-CM

## 2024-09-16 DIAGNOSIS — N18.31 CHRONIC KIDNEY DISEASE, STAGE 3A: ICD-10-CM

## 2024-09-16 DIAGNOSIS — M54.50 CHRONIC RIGHT-SIDED LOW BACK PAIN WITHOUT SCIATICA: Primary | ICD-10-CM

## 2024-09-16 DIAGNOSIS — M85.80 OSTEOPENIA, UNSPECIFIED LOCATION: Primary | ICD-10-CM

## 2024-09-16 DIAGNOSIS — N40.1 BENIGN PROSTATIC HYPERPLASIA WITH LOWER URINARY TRACT SYMPTOMS, SYMPTOM DETAILS UNSPECIFIED: ICD-10-CM

## 2024-09-16 DIAGNOSIS — M48.56XA COLLAPSED VERTEBRA, NOT ELSEWHERE CLASSIFIED, LUMBAR REGION, INITIAL ENCOUNTER FOR FRACTURE: ICD-10-CM

## 2024-09-16 DIAGNOSIS — G89.29 CHRONIC RIGHT-SIDED LOW BACK PAIN WITHOUT SCIATICA: Primary | ICD-10-CM

## 2024-09-16 DIAGNOSIS — M54.50 CHRONIC RIGHT-SIDED LOW BACK PAIN WITHOUT SCIATICA: ICD-10-CM

## 2024-09-16 PROCEDURE — 3077F SYST BP >= 140 MM HG: CPT | Mod: CPTII,S$GLB,, | Performed by: FAMILY MEDICINE

## 2024-09-16 PROCEDURE — 1125F AMNT PAIN NOTED PAIN PRSNT: CPT | Mod: CPTII,S$GLB,, | Performed by: FAMILY MEDICINE

## 2024-09-16 PROCEDURE — 3288F FALL RISK ASSESSMENT DOCD: CPT | Mod: CPTII,S$GLB,, | Performed by: FAMILY MEDICINE

## 2024-09-16 PROCEDURE — 99214 OFFICE O/P EST MOD 30 MIN: CPT | Mod: S$GLB,,, | Performed by: FAMILY MEDICINE

## 2024-09-16 PROCEDURE — 99999 PR PBB SHADOW E&M-EST. PATIENT-LVL IV: CPT | Mod: PBBFAC,,, | Performed by: FAMILY MEDICINE

## 2024-09-16 PROCEDURE — 1101F PT FALLS ASSESS-DOCD LE1/YR: CPT | Mod: CPTII,S$GLB,, | Performed by: FAMILY MEDICINE

## 2024-09-16 PROCEDURE — 72110 X-RAY EXAM L-2 SPINE 4/>VWS: CPT | Mod: TC,PO

## 2024-09-16 PROCEDURE — 1157F ADVNC CARE PLAN IN RCRD: CPT | Mod: CPTII,S$GLB,, | Performed by: FAMILY MEDICINE

## 2024-09-16 PROCEDURE — G2211 COMPLEX E/M VISIT ADD ON: HCPCS | Mod: S$GLB,,, | Performed by: FAMILY MEDICINE

## 2024-09-16 PROCEDURE — 3078F DIAST BP <80 MM HG: CPT | Mod: CPTII,S$GLB,, | Performed by: FAMILY MEDICINE

## 2024-09-16 PROCEDURE — 1159F MED LIST DOCD IN RCRD: CPT | Mod: CPTII,S$GLB,, | Performed by: FAMILY MEDICINE

## 2024-09-16 RX ORDER — TAMSULOSIN HYDROCHLORIDE 0.4 MG/1
CAPSULE ORAL DAILY
COMMUNITY
End: 2024-09-16 | Stop reason: ALTCHOICE

## 2024-09-16 RX ORDER — DEXTROMETHORPHAN HYDROBROMIDE, GUAIFENESIN 5; 100 MG/5ML; MG/5ML
650-1300 LIQUID ORAL EVERY 8 HOURS
COMMUNITY
Start: 2024-09-16

## 2024-09-16 RX ORDER — SILODOSIN 4 MG/1
4 CAPSULE ORAL DAILY
Qty: 90 CAPSULE | Refills: 3 | Status: SHIPPED | OUTPATIENT
Start: 2024-09-16 | End: 2025-09-16

## 2024-09-16 NOTE — PROGRESS NOTES
History of Present Illness    Mr. Patel reports lower back pain, predominantly on the right side, which began approximately 9 months ago. The pain worsens with standing and walking, causing the patient to adopt a stooped posture. Sitting alleviates the pain. The patient denies radiation of pain down his legs, leg fatigue, or the need to stop walking due to leg discomfort.    The patient manages his pain by taking Aleve approximately 3 times per week, which he reports as effective in alleviating symptoms. He has not previously sought medical attention for this back pain. The patient's wife notes that he initially attributed the pain to a muscle strain but has recently increased his complaints about it.    CKD 3 noted asymptomatic.    BPH using tamsulosin again approximately every other day.  He has had issues previously with orthostatic hypotension.  Blood pressure actually elevated today.  The patient also has a pacemaker.    The patient denies pain in his buttocks or any shooting pain.      ROS:  Musculoskeletal: -joint pain, -muscle pain, +back pain           Alexander was seen today for back pain.    Diagnoses and all orders for this visit:    Chronic right-sided low back pain without sciatica  -     Ambulatory referral/consult to Physical/Occupational Therapy; Future  -     X-Ray Lumbar Spine 5 View; Future    Chronic kidney disease, stage 3a    Benign prostatic hyperplasia with lower urinary tract symptoms, symptom details unspecified    Other orders  -     acetaminophen (TYLENOL) 650 MG TbSR; Take 1-2 tablets (650-1,300 mg total) by mouth every 8 (eight) hours.  -     silodosin (RAPAFLO) 4 mg Cap capsule; Take 1 capsule (4 mg total) by mouth once daily.      Avoid NSAIDs due to patient's chronic kidney disease   change prostate medication from tamsulosin to silodosin   Opted not to start blood pressure medication despite slightly elevated readings, considering patient's history of low blood pressure      -  Follow up after completing physical therapy if symptoms do not improve.  - Contact the office if experiencing issues with lightheadedness while taking the new prostate medication (silodosin).    - Discontinued Aleve (naproxen) due to potential kidney effects.                 Past Medical History:  Past Medical History:   Diagnosis Date    Acute idiopathic gout of multiple sites 02/23/2022    Atherosclerosis of aorta 04/13/2021    He has atherosclerosis noted on imaging in the past and no tx is needed at this time.    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 This is followed by cardiology now.    Cardiac pacemaker in situ 04/13/2021    CKD (chronic kidney disease) stage 3, GFR 30-59 ml/min 12/25/2017    Alexander Patel has an Estimated Glumerular Filtration Rate (EGFR) between 30 and 59 consistent with the definition of chronic kidney disease stage 3.  eGFR if non   Date Value Ref Range Status  04/23/2018 53.8 (A) >60 mL/min/1.73 m^2 Final    Comment:    Calculation used to obtain the estimated glomerular filtration rate (eGFR) is the CKD-EPI equation.       Lab Results  Component     Colon polyp 10/08/2015    Diverticulosis of large intestine without hemorrhage 10/08/2015    Essential hypertension 08/28/2015    Gout 2012    Hyperlipidemia LDL goal < 130     Mild obstructive sleep apnea 02/16/2023    Orthostatic hypotension 07/14/2020    PAF (paroxysmal atrial fibrillation) 12/30/2020    He had an episode of afib in the past and he has not required anticoagulation and is not needing rate control.  He has not had stroke symptoms.    Second degree heart block 08/23/2019    Tubular adenoma of colon 02/25/2021     Past Surgical History:   Procedure Laterality Date    A-V CARDIAC PACEMAKER INSERTION N/A 8/23/2019    Procedure: INSERTION, CARDIAC PACEMAKER, DUAL CHAMBER;  Surgeon: Manas Meade MD;  [facility];   Service: Cardiology;  Laterality: N/A;    COLONOSCOPY N/A 10/8/2015    Procedure: COLONOSCOPY;  Surgeon: Avinash Lamb MD;  Location: [facility];  Service: Endoscopy;  Laterality: N/A;    COLONOSCOPY N/A 2/25/2021    Procedure: COLONOSCOPY;  Surgeon: Avinash Lamb MD;  Location: [facility] END[facility];  Service: Endoscopy;  Laterality: N/A;    FRACTURE SURGERY      hand     Review of patient's allergies indicates:   Allergen Reactions    No known drug allergies      Current Outpatient Medications on File Prior to Visit   Medication Sig Dispense Refill    allopurinoL (ZYLOPRIM) 100 MG tablet TAKE 1&1/2 TABLETS BY MOUTH ONCE DAILY. 135 tablet 2    aspirin 81 MG chewable tablet Take 81 mg by mouth once daily. Every day      cyanocobalamin (VITAMIN B-12) 1000 MCG tablet Take 1 tablet (1,000 mcg total) by mouth once daily.      GLUCOSAMINE HCL/CHONDR LAM A NA (OSTEO BI-FLEX ORAL) Take 2 tablets by mouth once daily.       multivit-min/FA/lycopen/lutein (CENTRUM SILVER MEN ORAL) Take 1 tablet by mouth once daily.      rosuvastatin (CRESTOR) 10 MG tablet TAKE 1 TABLET BY MOUTH EVERY DAY 90 tablet 1    [DISCONTINUED] tamsulosin (FLOMAX) 0.4 mg Cap Take by mouth once daily.       No current facility-administered medications on file prior to visit.     Social History     Socioeconomic History    Marital status:      Spouse name: Modesta    Number of children: 2   Occupational History    Occupation: retired   Tobacco Use    Smoking status: Never    Smokeless tobacco: Former     Types: Chew   Substance and Sexual Activity    Alcohol use: Yes     Comment: occasional    Drug use: No    Sexual activity: Yes     Family History   Problem Relation Name Age of Onset    Heart disease Mother      Lymphoma Mother      Colon polyps Mother      Alcohol abuse Father      Emphysema Father      Lymphoma Brother             ROS:  GENERAL: No fever, chills,  or significant weight changes.   CARDIOVASCULAR: Denies chest pain.  ABDOMEN: Appetite fine.  "Denies diarrhea, abdominal pain, hematemesis or blood in stool.  URINARY: No flank pain, dysuria or hematuria.    Vitals:    09/16/24 0802 09/16/24 0818   BP: (!) 165/86 (!) 148/76   Pulse: 60    Temp: 97.5 °F (36.4 °C)    TempSrc: Temporal    Weight: 70 kg (154 lb 6.4 oz)    Height: 5' 4" (1.626 m)        Wt Readings from Last 3 Encounters:   09/16/24 70 kg (154 lb 6.4 oz)   08/28/24 70.3 kg (154 lb 15.7 oz)   02/28/24 72.4 kg (159 lb 9.6 oz)       APPEARANCE: Well nourished, well developed, in no acute distress.    HEAD: Normocephalic.  Atraumatic.  EYES:   Right eye: Pupil reactive.  Conjunctiva clear.    Left eye: Pupil reactive.  Conjunctiva clear.    NECK: Supple. No bruits.  No JVD.  No cervical lymphadenopathy.  No thyromegaly.    CHEST: Breath sounds clear bilaterally.  Normal respiratory effort  CARDIOVASCULAR: Normal rate.  Regular rhythm.  No murmurs.  No rub.  No gallops.   No edema.  Abdomen soft nontender no hepatosplenomegaly  MENTAL STATUS: Alert.  Oriented x 3.  Back has decreased range of motion with extension lateral bending and flexion.  Mild tenderness palpation at the right SI joint area.  Negative straight leg raise.  Able to stand on heels and toes.  Deep tendon reflexes diminished, symmetric.  "

## 2024-09-17 ENCOUNTER — TELEPHONE (OUTPATIENT)
Dept: FAMILY MEDICINE | Facility: CLINIC | Age: 79
End: 2024-09-17
Payer: MEDICARE

## 2024-09-17 DIAGNOSIS — M54.50 CHRONIC RIGHT-SIDED LOW BACK PAIN WITHOUT SCIATICA: Primary | ICD-10-CM

## 2024-09-17 DIAGNOSIS — G89.29 CHRONIC RIGHT-SIDED LOW BACK PAIN WITHOUT SCIATICA: Primary | ICD-10-CM

## 2024-09-19 ENCOUNTER — TELEPHONE (OUTPATIENT)
Dept: FAMILY MEDICINE | Facility: CLINIC | Age: 79
End: 2024-09-19
Payer: MEDICARE

## 2024-09-19 ENCOUNTER — HOSPITAL ENCOUNTER (OUTPATIENT)
Dept: RADIOLOGY | Facility: HOSPITAL | Age: 79
Discharge: HOME | End: 2024-09-19
Attending: FAMILY MEDICINE
Payer: MEDICARE

## 2024-09-19 DIAGNOSIS — M85.80 OSTEOPENIA, UNSPECIFIED LOCATION: ICD-10-CM

## 2024-09-19 DIAGNOSIS — M48.56XA COLLAPSED VERTEBRA, NOT ELSEWHERE CLASSIFIED, LUMBAR REGION, INITIAL ENCOUNTER FOR FRACTURE: ICD-10-CM

## 2024-09-19 NOTE — TELEPHONE ENCOUNTER
Radiology sent a message that they could not do the Dexa scan on Mr Patel because he has a pacemaker .   Maliha....

## 2024-09-20 ENCOUNTER — PATIENT MESSAGE (OUTPATIENT)
Dept: FAMILY MEDICINE | Facility: CLINIC | Age: 79
End: 2024-09-20
Payer: MEDICARE

## 2024-09-20 NOTE — TELEPHONE ENCOUNTER
I talked with Tamie and she said that they can do it at [facility] as they have a different machine.  Otherwise could check with [facility] and see if they can do it with him having a pacemaker

## 2024-09-20 NOTE — TELEPHONE ENCOUNTER
Spoke to wife, informed her why dexa needs to be done at the [facility] and that lex would call to assist in scheduling

## 2024-09-20 NOTE — TELEPHONE ENCOUNTER
Sent message to pt via portal asking when convenient to resched at [facility] ? Will wait for his response .

## 2024-09-23 ENCOUNTER — TELEPHONE (OUTPATIENT)
Dept: FAMILY MEDICINE | Facility: CLINIC | Age: 79
End: 2024-09-23
Payer: MEDICARE

## 2024-09-23 NOTE — TELEPHONE ENCOUNTER
----- Message from Modesta Hernández sent at 9/23/2024 11:21 AM CDT -----  Regarding: Call Back  Please call Pts wife at [phone]. Pt has questions regarding his bone density that is scheduled for 10/1. They would like to confirm the test he is having done is scheduled at the right location. Please call Mrs. Modesta Patel at [phone]. Please advise.

## 2024-09-25 ENCOUNTER — APPOINTMENT (OUTPATIENT)
Dept: RADIOLOGY | Facility: HOSPITAL | Age: 79
End: 2024-09-25
Attending: FAMILY MEDICINE
Payer: MEDICARE

## 2024-09-25 PROCEDURE — 77080 DXA BONE DENSITY AXIAL: CPT | Mod: 26,,, | Performed by: RADIOLOGY

## 2024-09-25 PROCEDURE — 77080 DXA BONE DENSITY AXIAL: CPT | Mod: TC

## 2024-09-26 ENCOUNTER — PATIENT MESSAGE (OUTPATIENT)
Dept: FAMILY MEDICINE | Facility: CLINIC | Age: 79
End: 2024-09-26
Payer: MEDICARE

## 2024-10-08 DIAGNOSIS — E79.0 HYPERURICEMIA: ICD-10-CM

## 2024-10-08 DIAGNOSIS — M10.072 ACUTE IDIOPATHIC GOUT OF LEFT FOOT: ICD-10-CM

## 2024-10-08 RX ORDER — ALLOPURINOL 100 MG/1
150 TABLET ORAL
Qty: 135 TABLET | Refills: 0 | Status: SHIPPED | OUTPATIENT
Start: 2024-10-08

## 2024-10-08 NOTE — TELEPHONE ENCOUNTER
Refill Decision Note   Alexander Patel  is requesting a refill authorization.  Brief Assessment and Rationale for Refill:  Approve     Medication Therapy Plan:         Alert overridden per protocol: Yes   Comments:     Note composed:6:57 PM 10/08/2024

## 2024-10-08 NOTE — TELEPHONE ENCOUNTER
No care due was identified.  [facility] Embedded Care Due Messages. Reference number: [id number].   10/08/2024 10:23:37 AM CDT

## 2024-10-09 ENCOUNTER — OFFICE VISIT (OUTPATIENT)
Dept: FAMILY MEDICINE | Facility: CLINIC | Age: 79
End: 2024-10-09
Payer: MEDICARE

## 2024-10-09 VITALS
HEIGHT: 64 IN | TEMPERATURE: 98 F | BODY MASS INDEX: 26.24 KG/M2 | SYSTOLIC BLOOD PRESSURE: 130 MMHG | HEART RATE: 60 BPM | DIASTOLIC BLOOD PRESSURE: 68 MMHG | WEIGHT: 153.69 LBS

## 2024-10-09 DIAGNOSIS — G89.29 CHRONIC RIGHT-SIDED LOW BACK PAIN WITHOUT SCIATICA: ICD-10-CM

## 2024-10-09 DIAGNOSIS — M81.0 OSTEOPOROSIS, UNSPECIFIED OSTEOPOROSIS TYPE, UNSPECIFIED PATHOLOGICAL FRACTURE PRESENCE: Primary | ICD-10-CM

## 2024-10-09 DIAGNOSIS — N18.31 CHRONIC KIDNEY DISEASE, STAGE 3A: ICD-10-CM

## 2024-10-09 DIAGNOSIS — E78.5 HYPERLIPIDEMIA WITH TARGET LDL LESS THAN 130: ICD-10-CM

## 2024-10-09 DIAGNOSIS — M10.9 GOUT, UNSPECIFIED CAUSE, UNSPECIFIED CHRONICITY, UNSPECIFIED SITE: ICD-10-CM

## 2024-10-09 DIAGNOSIS — M54.50 CHRONIC RIGHT-SIDED LOW BACK PAIN WITHOUT SCIATICA: ICD-10-CM

## 2024-10-09 PROCEDURE — G2211 COMPLEX E/M VISIT ADD ON: HCPCS | Mod: S$GLB,,, | Performed by: FAMILY MEDICINE

## 2024-10-09 PROCEDURE — 1159F MED LIST DOCD IN RCRD: CPT | Mod: CPTII,S$GLB,, | Performed by: FAMILY MEDICINE

## 2024-10-09 PROCEDURE — 1101F PT FALLS ASSESS-DOCD LE1/YR: CPT | Mod: CPTII,S$GLB,, | Performed by: FAMILY MEDICINE

## 2024-10-09 PROCEDURE — 3288F FALL RISK ASSESSMENT DOCD: CPT | Mod: CPTII,S$GLB,, | Performed by: FAMILY MEDICINE

## 2024-10-09 PROCEDURE — 99214 OFFICE O/P EST MOD 30 MIN: CPT | Mod: S$GLB,,, | Performed by: FAMILY MEDICINE

## 2024-10-09 PROCEDURE — 99999 PR PBB SHADOW E&M-EST. PATIENT-LVL III: CPT | Mod: PBBFAC,,, | Performed by: FAMILY MEDICINE

## 2024-10-09 PROCEDURE — 3078F DIAST BP <80 MM HG: CPT | Mod: CPTII,S$GLB,, | Performed by: FAMILY MEDICINE

## 2024-10-09 PROCEDURE — 3075F SYST BP GE 130 - 139MM HG: CPT | Mod: CPTII,S$GLB,, | Performed by: FAMILY MEDICINE

## 2024-10-09 PROCEDURE — 1157F ADVNC CARE PLAN IN RCRD: CPT | Mod: CPTII,S$GLB,, | Performed by: FAMILY MEDICINE

## 2024-10-09 RX ORDER — ALENDRONATE SODIUM 70 MG/1
70 TABLET ORAL
Qty: 12 TABLET | Refills: 3 | Status: SHIPPED | OUTPATIENT
Start: 2024-10-09 | End: 2025-10-09

## 2024-10-09 NOTE — PATIENT INSTRUCTIONS
For lab on 1016/24, do at 8am      Health Maintenance Due   Topic Date Due    RSV Vaccine (Age 60+ and Pregnant patients) (1 - 1-dose 75+ series) Never done    TETANUS VACCINE  04/28/2024    Influenza Vaccine (1) 09/01/2024    COVID-19 Vaccine (6 - 2024-25 season) 09/01/2024

## 2024-10-09 NOTE — PROGRESS NOTES
Patient presents follow-up    Mr. Patel reports ongoing back pain localized to the right side of his lower back, specifically in the sacroiliac (SI) joint area. He describes the pain as sharp and exacerbated by standing and walking. The patient denies radiation of pain down his legs. The pain intensity fluctuates, with increased severity when standing.    The patient has been attending physical therapy twice weekly for 3-4 weeks. He finds most of the therapy beneficial and performs prescribed home exercises between sessions.    A recent x-ray revealed a superior endplate depression at L1, though this finding is not believed to be the source of the patient's current pain due to its central location. A bone density scan showed osteopenia, with the hip (T-score -2.3) more affected than the spine (T-score -1.0).  Did have increased fracture risk noted    The patient has been taking Tylenol Arthritis for pain relief. He denies any fall or specific injury causing his back pain, osteoporosis, or any upcoming major dental procedures.    Gout on allopurinol without recent recurrence.  CKD 3 avoiding NSAIDs.  Hyperlipidemia on statin.  ROS:  Musculoskeletal: +back pain           Alexander was seen today for follow-up.    Diagnoses and all orders for this visit:    Osteoporosis, unspecified osteoporosis type, unspecified pathological fracture presence  -     CBC Auto Differential; Future  -     Comprehensive Metabolic Panel; Future  -     Testosterone; Future  -     Vitamin D; Future  -     PHOSPHORUS; Future  -     Calcium, Timed Urine [facility]; 24 Hours; Future    Chronic right-sided low back pain without sciatica    Gout, unspecified cause, unspecified chronicity, unspecified site  -     CBC Auto Differential; Future  -     Uric Acid; Future    Hyperlipidemia with target LDL less than 130  -     Lipid Panel; Future    Chronic kidney disease, stage 3a  -     CBC Auto Differential; Future  -     Comprehensive Metabolic Panel;  Future    Other orders  -     alendronate (FOSAMAX) 70 MG tablet; Take 1 tablet (70 mg total) by mouth every 7 days.    Would treat as osteoporosis based on imaging findings  Assessed SI joint as likely source of patient's persistent right-sided back pain  Considered pain management referral for potential SI joint injection if symptoms persist after continued physical therapy      BACK PAIN:  - Mr. Patel to continue physical therapy exercises at home between sessions.  - Mr. Patel can apply heat or ice to affected area for pain relief.  - Mr. Patel to avoid activities that exacerbate back pain.  - Continued Tylenol Arthritis as needed for pain relief.  - Contact the office if back pain persists after continued physical therapy for potential pain management referral.          Past Medical History:  Past Medical History:   Diagnosis Date    Acute idiopathic gout of multiple sites 02/23/2022    Atherosclerosis of aorta 04/13/2021    He has atherosclerosis noted on imaging in the past and no tx is needed at this time.    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 This is followed by cardiology now.    Cardiac pacemaker in situ 04/13/2021    CKD (chronic kidney disease) stage 3, GFR 30-59 ml/min 12/25/2017    Alexander Patel has an Estimated Glumerular Filtration Rate (EGFR) between 30 and 59 consistent with the definition of chronic kidney disease stage 3.  eGFR if non   Date Value Ref Range Status  04/23/2018 53.8 (A) >60 mL/min/1.73 m^2 Final    Comment:    Calculation used to obtain the estimated glomerular filtration rate (eGFR) is the CKD-EPI equation.       Lab Results  Component     Colon polyp 10/08/2015    Diverticulosis of large intestine without hemorrhage 10/08/2015    Essential hypertension 08/28/2015    Gout 2012    Hyperlipidemia LDL goal < 130     Mild obstructive sleep apnea 02/16/2023    Orthostatic hypotension  07/14/2020    PAF (paroxysmal atrial fibrillation) 12/30/2020    He had an episode of afib in the past and he has not required anticoagulation and is not needing rate control.  He has not had stroke symptoms.    Second degree heart block 08/23/2019    Tubular adenoma of colon 02/25/2021     Past Surgical History:   Procedure Laterality Date    A-V CARDIAC PACEMAKER INSERTION N/A 8/23/2019    Procedure: INSERTION, CARDIAC PACEMAKER, DUAL CHAMBER;  Surgeon: Manas Meade MD;  Location: [facility] CATH;  Service: Cardiology;  Laterality: N/A;    COLONOSCOPY N/A 10/8/2015    Procedure: COLONOSCOPY;  Surgeon: Avinash Lamb MD;  Location: [facility] ENDO;  Service: Endoscopy;  Laterality: N/A;    COLONOSCOPY N/A 2/25/2021    Procedure: COLONOSCOPY;  Surgeon: Avinash Lamb MD;  Location: [facility] ENDO;  Service: Endoscopy;  Laterality: N/A;    FRACTURE SURGERY      hand     Review of patient's allergies indicates:   Allergen Reactions    No known drug allergies      Current Outpatient Medications on File Prior to Visit   Medication Sig Dispense Refill    acetaminophen (TYLENOL) 650 MG TbSR Take 1-2 tablets (650-1,300 mg total) by mouth every 8 (eight) hours.      allopurinoL (ZYLOPRIM) 100 MG tablet TAKE 1&1/2 TABLETS BY MOUTH ONCE DAILY. 135 tablet 0    aspirin 81 MG chewable tablet Take 81 mg by mouth once daily. Every day      cyanocobalamin (VITAMIN B-12) 1000 MCG tablet Take 1 tablet (1,000 mcg total) by mouth once daily.      GLUCOSAMINE HCL/CHONDR LAM A NA (OSTEO BI-FLEX ORAL) Take 2 tablets by mouth once daily.       multivit-min/FA/lycopen/lutein (CENTRUM SILVER MEN ORAL) Take 1 tablet by mouth once daily.      rosuvastatin (CRESTOR) 10 MG tablet TAKE 1 TABLET BY MOUTH EVERY DAY 90 tablet 1    silodosin (RAPAFLO) 4 mg Cap capsule Take 1 capsule (4 mg total) by mouth once daily. 90 capsule 3     No current facility-administered medications on file prior to visit.     Social History     Socioeconomic History     "Marital status:      Spouse name: Modesta    Number of children: 2   Occupational History    Occupation: retired   Tobacco Use    Smoking status: Never    Smokeless tobacco: Former     Types: Chew   Substance and Sexual Activity    Alcohol use: Yes     Comment: occasional    Drug use: No    Sexual activity: Yes     Family History   Problem Relation Name Age of Onset    Heart disease Mother      Lymphoma Mother      Colon polyps Mother      Alcohol abuse Father      Emphysema Father      Lymphoma Brother             Vitals:    10/09/24 1011 10/09/24 1057   BP: (!) 149/83 130/68   Pulse: 60    Temp: 97.7 °F (36.5 °C)    TempSrc: Temporal    Weight: 69.7 kg (153 lb 11.2 oz)    Height: 5' 4" (1.626 m)        Wt Readings from Last 3 Encounters:   10/09/24 69.7 kg (153 lb 11.2 oz)   09/16/24 70 kg (154 lb 6.4 oz)   08/28/24 70.3 kg (154 lb 15.7 oz)       APPEARANCE: Well nourished, well developed, in no acute distress.    HEAD: Normocephalic.  Atraumatic.  EYES:   Right eye: Pupil reactive.  Conjunctiva clear.    Left eye: Pupil reactive.  Conjunctiva clear.    NECK: Supple.   MENTAL STATUS: Alert.  Oriented x 3.  Back with some decreased range motion.  Has some tenderness palpation of the right SI joint area.  No spinous tenderness.  "

## 2024-10-16 ENCOUNTER — LAB VISIT (OUTPATIENT)
Dept: LAB | Facility: HOSPITAL | Age: 79
End: 2024-10-16
Attending: FAMILY MEDICINE
Payer: MEDICARE

## 2024-10-16 DIAGNOSIS — E78.5 HYPERLIPIDEMIA WITH TARGET LDL LESS THAN 130: ICD-10-CM

## 2024-10-16 DIAGNOSIS — N18.31 CHRONIC KIDNEY DISEASE, STAGE 3A: ICD-10-CM

## 2024-10-16 DIAGNOSIS — M81.0 OSTEOPOROSIS, UNSPECIFIED OSTEOPOROSIS TYPE, UNSPECIFIED PATHOLOGICAL FRACTURE PRESENCE: ICD-10-CM

## 2024-10-16 DIAGNOSIS — M10.9 GOUT, UNSPECIFIED CAUSE, UNSPECIFIED CHRONICITY, UNSPECIFIED SITE: ICD-10-CM

## 2024-10-16 LAB
25(OH)D3+25(OH)D2 SERPL-MCNC: 41 NG/ML (ref 30–96)
ALBUMIN SERPL BCP-MCNC: 4.1 G/DL (ref 3.5–5.2)
ALP SERPL-CCNC: 67 U/L (ref 55–135)
ALT SERPL W/O P-5'-P-CCNC: 40 U/L (ref 10–44)
ANION GAP SERPL CALC-SCNC: 9 MMOL/L (ref 8–16)
AST SERPL-CCNC: 35 U/L (ref 10–40)
BASOPHILS # BLD AUTO: 0.04 K/UL (ref 0–0.2)
BASOPHILS NFR BLD: 0.6 % (ref 0–1.9)
BILIRUB SERPL-MCNC: 0.7 MG/DL (ref 0.1–1)
BUN SERPL-MCNC: 22 MG/DL (ref 8–23)
CALCIUM SERPL-MCNC: 9.5 MG/DL (ref 8.7–10.5)
CHLORIDE SERPL-SCNC: 109 MMOL/L (ref 95–110)
CHOLEST SERPL-MCNC: 133 MG/DL (ref 120–199)
CHOLEST/HDLC SERPL: 2.7 {RATIO} (ref 2–5)
CO2 SERPL-SCNC: 24 MMOL/L (ref 23–29)
CREAT SERPL-MCNC: 1.5 MG/DL (ref 0.5–1.4)
DIFFERENTIAL METHOD BLD: ABNORMAL
EOSINOPHIL # BLD AUTO: 0.2 K/UL (ref 0–0.5)
EOSINOPHIL NFR BLD: 3.4 % (ref 0–8)
ERYTHROCYTE [DISTWIDTH] IN BLOOD BY AUTOMATED COUNT: 13.8 % (ref 11.5–14.5)
EST. GFR  (NO RACE VARIABLE): 46.8 ML/MIN/1.73 M^2
GLUCOSE SERPL-MCNC: 100 MG/DL (ref 70–110)
HCT VFR BLD AUTO: 35.6 % (ref 40–54)
HDLC SERPL-MCNC: 49 MG/DL (ref 40–75)
HDLC SERPL: 36.8 % (ref 20–50)
HGB BLD-MCNC: 11.6 G/DL (ref 14–18)
IMM GRANULOCYTES # BLD AUTO: 0.02 K/UL (ref 0–0.04)
IMM GRANULOCYTES NFR BLD AUTO: 0.3 % (ref 0–0.5)
LDLC SERPL CALC-MCNC: 62 MG/DL (ref 63–159)
LYMPHOCYTES # BLD AUTO: 1.6 K/UL (ref 1–4.8)
LYMPHOCYTES NFR BLD: 26 % (ref 18–48)
MCH RBC QN AUTO: 31.2 PG (ref 27–31)
MCHC RBC AUTO-ENTMCNC: 32.6 G/DL (ref 32–36)
MCV RBC AUTO: 96 FL (ref 82–98)
MONOCYTES # BLD AUTO: 0.6 K/UL (ref 0.3–1)
MONOCYTES NFR BLD: 9.9 % (ref 4–15)
NEUTROPHILS # BLD AUTO: 3.7 K/UL (ref 1.8–7.7)
NEUTROPHILS NFR BLD: 59.8 % (ref 38–73)
NONHDLC SERPL-MCNC: 84 MG/DL
NRBC BLD-RTO: 0 /100 WBC
PHOSPHATE SERPL-MCNC: 3.8 MG/DL (ref 2.7–4.5)
PLATELET # BLD AUTO: 195 K/UL (ref 150–450)
PMV BLD AUTO: 10.7 FL (ref 9.2–12.9)
POTASSIUM SERPL-SCNC: 4.4 MMOL/L (ref 3.5–5.1)
PROT SERPL-MCNC: 6.9 G/DL (ref 6–8.4)
RBC # BLD AUTO: 3.72 M/UL (ref 4.6–6.2)
SODIUM SERPL-SCNC: 142 MMOL/L (ref 136–145)
TESTOST SERPL-MCNC: 261 NG/DL (ref 304–1227)
TRIGL SERPL-MCNC: 110 MG/DL (ref 30–150)
URATE SERPL-MCNC: 6.8 MG/DL (ref 3.4–7)
WBC # BLD AUTO: 6.24 K/UL (ref 3.9–12.7)

## 2024-10-16 PROCEDURE — 80061 LIPID PANEL: CPT | Performed by: FAMILY MEDICINE

## 2024-10-16 PROCEDURE — 84100 ASSAY OF PHOSPHORUS: CPT | Performed by: FAMILY MEDICINE

## 2024-10-16 PROCEDURE — 84550 ASSAY OF BLOOD/URIC ACID: CPT | Performed by: FAMILY MEDICINE

## 2024-10-16 PROCEDURE — 80053 COMPREHEN METABOLIC PANEL: CPT | Performed by: FAMILY MEDICINE

## 2024-10-16 PROCEDURE — 82306 VITAMIN D 25 HYDROXY: CPT | Performed by: FAMILY MEDICINE

## 2024-10-16 PROCEDURE — 85025 COMPLETE CBC W/AUTO DIFF WBC: CPT | Performed by: FAMILY MEDICINE

## 2024-10-16 PROCEDURE — 84403 ASSAY OF TOTAL TESTOSTERONE: CPT | Performed by: FAMILY MEDICINE

## 2024-10-16 PROCEDURE — 36415 COLL VENOUS BLD VENIPUNCTURE: CPT | Mod: PO | Performed by: FAMILY MEDICINE

## 2024-10-24 DIAGNOSIS — R79.89 LOW TESTOSTERONE: Primary | ICD-10-CM

## 2024-10-28 ENCOUNTER — LAB VISIT (OUTPATIENT)
Dept: LAB | Facility: HOSPITAL | Age: 79
End: 2024-10-28
Attending: FAMILY MEDICINE
Payer: MEDICARE

## 2024-10-28 DIAGNOSIS — R79.89 LOW TESTOSTERONE: ICD-10-CM

## 2024-10-28 LAB — LH SERPL-ACNC: 23.5 MIU/ML (ref 0.6–12.1)

## 2024-10-28 PROCEDURE — 84402 ASSAY OF FREE TESTOSTERONE: CPT | Performed by: FAMILY MEDICINE

## 2024-10-28 PROCEDURE — 36415 COLL VENOUS BLD VENIPUNCTURE: CPT | Mod: PO | Performed by: FAMILY MEDICINE

## 2024-10-28 PROCEDURE — 84403 ASSAY OF TOTAL TESTOSTERONE: CPT | Performed by: FAMILY MEDICINE

## 2024-10-28 PROCEDURE — 83002 ASSAY OF GONADOTROPIN (LH): CPT | Performed by: FAMILY MEDICINE

## 2024-11-15 ENCOUNTER — HOSPITAL ENCOUNTER (OUTPATIENT)
Dept: CARDIOLOGY | Facility: HOSPITAL | Age: 79
Discharge: HOME | End: 2024-11-15
Attending: INTERNAL MEDICINE
Payer: MEDICARE

## 2024-11-15 ENCOUNTER — CLINICAL SUPPORT (OUTPATIENT)
Dept: CARDIOLOGY | Facility: HOSPITAL | Age: 79
End: 2024-11-15
Payer: MEDICARE

## 2024-11-15 ENCOUNTER — TELEPHONE (OUTPATIENT)
Dept: CARDIOLOGY | Facility: HOSPITAL | Age: 79
End: 2024-11-15
Payer: MEDICARE

## 2024-11-15 DIAGNOSIS — I44.1 ATRIOVENTRICULAR BLOCK, SECOND DEGREE: ICD-10-CM

## 2024-11-15 DIAGNOSIS — I48.0 PAF (PAROXYSMAL ATRIAL FIBRILLATION): Primary | ICD-10-CM

## 2024-11-15 PROCEDURE — 93294 REM INTERROG EVL PM/LDLS PM: CPT | Mod: ,,, | Performed by: INTERNAL MEDICINE

## 2024-11-15 PROCEDURE — 93296 REM INTERROG EVL PM/IDS: CPT | Mod: PO | Performed by: INTERNAL MEDICINE

## 2024-11-15 NOTE — TELEPHONE ENCOUNTER
Atrial fibrillation noted, no OAC, during device remote check:    Overall burden:  17% past 24hrs, 0% since 6/25/24    Max duration seen: onset 9:15pm, ongoing at time of transmission 12:54am          Ventricular rates:   Controlled, AVG Vrate 71bpm    Anticoagulation status:  no OAC  Aspirin 81mg daily      Patient symptoms: denies any s/s of AF    F/U scheduled 2/28/25

## 2024-11-15 NOTE — TELEPHONE ENCOUNTER
Spoke to patient spouse  Notified of orders received from Dr. Meade:  Stop aspirin.    Start Eliquis 5 mg p.o. b.i.d..    Follow-up in 2-3 weeks to determine need for cardioversion   Discussed need for OAC, VU  Prescription sent to [facility] in [place] per request  Appt scheduled with GREG Bhat 12/4

## 2024-11-18 ENCOUNTER — TELEPHONE (OUTPATIENT)
Dept: CARDIOLOGY | Facility: HOSPITAL | Age: 79
End: 2024-11-18
Payer: MEDICARE

## 2024-11-18 NOTE — TELEPHONE ENCOUNTER
Discussed episode with Dr. Meade, event 10hr 47mins, requested to schedule in clinic next available, possible discuss AF with Dr. Reynolds    Spoke to spouse  Notified of updated appts  VU

## 2024-11-27 LAB
OHS CV AF BURDEN PERCENT: < 1
OHS CV DC REMOTE DEVICE TYPE: NORMAL
OHS CV ICD SHOCK: NO
OHS CV RV PACING PERCENT: 31 %

## 2025-01-08 ENCOUNTER — HOSPITAL ENCOUNTER (OUTPATIENT)
Dept: CARDIOLOGY | Facility: HOSPITAL | Age: 80
Discharge: HOME | End: 2025-01-08
Attending: INTERNAL MEDICINE
Payer: MEDICARE

## 2025-01-08 ENCOUNTER — OFFICE VISIT (OUTPATIENT)
Dept: CARDIOLOGY | Facility: CLINIC | Age: 80
End: 2025-01-08
Payer: MEDICARE

## 2025-01-08 VITALS
SYSTOLIC BLOOD PRESSURE: 187 MMHG | WEIGHT: 153 LBS | BODY MASS INDEX: 26.12 KG/M2 | DIASTOLIC BLOOD PRESSURE: 92 MMHG | HEART RATE: 59 BPM | HEIGHT: 64 IN

## 2025-01-08 DIAGNOSIS — I48.0 PAF (PAROXYSMAL ATRIAL FIBRILLATION): ICD-10-CM

## 2025-01-08 DIAGNOSIS — Z95.0 CARDIAC PACEMAKER IN SITU: ICD-10-CM

## 2025-01-08 DIAGNOSIS — I10 ESSENTIAL HYPERTENSION: Primary | ICD-10-CM

## 2025-01-08 DIAGNOSIS — E78.5 HYPERLIPIDEMIA WITH TARGET LDL LESS THAN 130: ICD-10-CM

## 2025-01-08 PROCEDURE — 99214 OFFICE O/P EST MOD 30 MIN: CPT | Mod: S$GLB,,, | Performed by: INTERNAL MEDICINE

## 2025-01-08 PROCEDURE — 93280 PM DEVICE PROGR EVAL DUAL: CPT | Mod: PO

## 2025-01-08 PROCEDURE — 1101F PT FALLS ASSESS-DOCD LE1/YR: CPT | Mod: CPTII,S$GLB,, | Performed by: INTERNAL MEDICINE

## 2025-01-08 PROCEDURE — 1157F ADVNC CARE PLAN IN RCRD: CPT | Mod: CPTII,S$GLB,, | Performed by: INTERNAL MEDICINE

## 2025-01-08 PROCEDURE — 1126F AMNT PAIN NOTED NONE PRSNT: CPT | Mod: CPTII,S$GLB,, | Performed by: INTERNAL MEDICINE

## 2025-01-08 PROCEDURE — 3080F DIAST BP >= 90 MM HG: CPT | Mod: CPTII,S$GLB,, | Performed by: INTERNAL MEDICINE

## 2025-01-08 PROCEDURE — 93280 PM DEVICE PROGR EVAL DUAL: CPT | Mod: 26,,, | Performed by: INTERNAL MEDICINE

## 2025-01-08 PROCEDURE — 1159F MED LIST DOCD IN RCRD: CPT | Mod: CPTII,S$GLB,, | Performed by: INTERNAL MEDICINE

## 2025-01-08 PROCEDURE — 3288F FALL RISK ASSESSMENT DOCD: CPT | Mod: CPTII,S$GLB,, | Performed by: INTERNAL MEDICINE

## 2025-01-08 PROCEDURE — 3077F SYST BP >= 140 MM HG: CPT | Mod: CPTII,S$GLB,, | Performed by: INTERNAL MEDICINE

## 2025-01-08 PROCEDURE — 99999 PR PBB SHADOW E&M-EST. PATIENT-LVL III: CPT | Mod: PBBFAC,,, | Performed by: INTERNAL MEDICINE

## 2025-01-08 PROCEDURE — 1160F RVW MEDS BY RX/DR IN RCRD: CPT | Mod: CPTII,S$GLB,, | Performed by: INTERNAL MEDICINE

## 2025-01-08 NOTE — PROGRESS NOTES
Subjective:    Patient ID:  Alexander Patel is a 79 y.o. male who presents for follow-up of Follow-up (9 month f/u) and Atrial Fibrillation      HPI  He comes for follow up with no major problems, no chest pain, no shortness of breath.  He is actually feeling good today and with no problems.    Over the last six-month he has been having more frequent episodes of atrial fibrillation back in November we decided to start him on Eliquis.  He denies palpitations although he gets occasional shortness of breath.    Review of Systems   Constitutional: Negative for decreased appetite, malaise/fatigue, weight gain and weight loss.   Cardiovascular:  Negative for chest pain, dyspnea on exertion, leg swelling, palpitations and syncope.   Respiratory:  Negative for cough and shortness of breath.    Gastrointestinal: Negative.    Neurological:  Negative for weakness.   All other systems reviewed and are negative.       Objective:      Physical Exam  Vitals and nursing note reviewed.   Constitutional:       Appearance: Normal appearance. He is well-developed.   HENT:      Head: Normocephalic.   Eyes:      Pupils: Pupils are equal, round, and reactive to light.   Neck:      Thyroid: No thyromegaly.      Vascular: No carotid bruit or JVD.   Cardiovascular:      Rate and Rhythm: Normal rate and regular rhythm.      Chest Wall: PMI is not displaced.      Pulses: Normal pulses and intact distal pulses.      Heart sounds: Normal heart sounds. No murmur heard.     No gallop.   Pulmonary:      Effort: Pulmonary effort is normal.      Breath sounds: Normal breath sounds.   Abdominal:      Palpations: Abdomen is soft. There is no mass.      Tenderness: There is no abdominal tenderness.   Musculoskeletal:         General: Normal range of motion.      Cervical back: Normal range of motion and neck supple.   Skin:     General: Skin is warm.   Neurological:      Mental Status: He is alert and oriented to person, place, and time.      Sensory: No  sensory deficit.      Deep Tendon Reflexes: Reflexes are normal and symmetric.             Most Recent EKG Results  Results for orders placed or performed in visit on 06/30/22   EKG 12-lead    Collection Time: 06/30/22  8:34 AM    Narrative    Test Reason : I95.9,R42,    Vent. Rate : 060 BPM     Atrial Rate : 060 BPM     P-R Int : 290 ms          QRS Dur : 090 ms      QT Int : 400 ms       P-R-T Axes : 000 -27 -04 degrees     QTc Int : 400 ms    Atrial-paced rhythm with prolonged AV conduction  Abnormal ECG  When compared with ECG of 02-JUL-2020 16:50,  No significant change was found  Confirmed by SEA MIRANDA MD ([id number]) on 7/1/2022 1:23:36 PM    Referred By: KHLOE SALCEDO           Confirmed By:SEA MIRANDA MD       Most Recent Echocardiogram Results  Results for orders placed in visit on 07/19/23    Echo    Interpretation Summary  · The left ventricle is normal in size with concentric remodeling and normal systolic function.  · The estimated ejection fraction is 60%.  · Grade I left ventricular diastolic dysfunction.  · Normal right ventricular size with normal right ventricular systolic function.  · Trivial to mild mitral regurgitation.  · Normal central venous pressure (3 mmHg).  · The estimated PA systolic pressure is 30 mmHg.      Most Recent Nuclear Stress Test Results  Results for orders placed during the hospital encounter of 07/19/23    Nuclear Stress - Cardiology Interpreted    Interpretation Summary    Normal myocardial perfusion scan. There is no evidence of myocardial ischemia or infarction.    The gated perfusion images showed an ejection fraction of 72% post stress.    There is normal wall motion post stress.    LV cavity size is normal at stress.    The ECG portion of the study is uninterpretable.    The patient reported no chest pain during the stress test.    There were no arrhythmias during stress.    This is a low risk study.      Most Recent Cardiac PET Stress Test Results  No results found  for this or any previous visit.      Most Recent Cardiovascular Angiogram results  No results found for this or any previous visit.      Other Most Recent Cardiology Results  Results for orders placed in visit on 11/15/24    Cardiac device check - Remote alert      Labs reviewed    Assessment:       1. Essential hypertension    2. PAF (paroxysmal atrial fibrillation)    3. Hyperlipidemia with target LDL less than 130    4. Cardiac pacemaker in situ         Plan:   Will refer to Dr. Coker for evaluation and assessment of atrial fibrillation (paroxysmal, new onset)    Continue:  DOAC and Statin  Regular exercise program  Weight loss  Low cholesterol diet    Follow-up in six-month

## 2025-01-10 LAB
OHS CV AF BURDEN PERCENT: < 1
OHS CV DC REMOTE DEVICE TYPE: NORMAL
OHS CV RV PACING PERCENT: 28 %

## 2025-02-14 ENCOUNTER — HOSPITAL ENCOUNTER (OUTPATIENT)
Dept: CARDIOLOGY | Facility: HOSPITAL | Age: 80
Discharge: HOME | End: 2025-02-14
Attending: INTERNAL MEDICINE
Payer: MEDICARE

## 2025-02-14 ENCOUNTER — CLINICAL SUPPORT (OUTPATIENT)
Dept: CARDIOLOGY | Facility: HOSPITAL | Age: 80
End: 2025-02-14
Payer: MEDICARE

## 2025-02-14 DIAGNOSIS — I44.1 ATRIOVENTRICULAR BLOCK, SECOND DEGREE: ICD-10-CM

## 2025-02-14 PROCEDURE — 93296 REM INTERROG EVL PM/IDS: CPT | Mod: PO | Performed by: INTERNAL MEDICINE

## 2025-02-14 PROCEDURE — 93294 REM INTERROG EVL PM/LDLS PM: CPT | Mod: ,,, | Performed by: INTERNAL MEDICINE

## 2025-02-19 DIAGNOSIS — Z95.0 CARDIAC PACEMAKER IN SITU: Primary | ICD-10-CM

## 2025-02-27 ENCOUNTER — OFFICE VISIT (OUTPATIENT)
Dept: FAMILY MEDICINE | Facility: CLINIC | Age: 80
End: 2025-02-27
Payer: MEDICARE

## 2025-02-27 ENCOUNTER — TELEPHONE (OUTPATIENT)
Dept: PAIN MEDICINE | Facility: CLINIC | Age: 80
End: 2025-02-27
Payer: MEDICARE

## 2025-02-27 VITALS
DIASTOLIC BLOOD PRESSURE: 70 MMHG | WEIGHT: 148.13 LBS | SYSTOLIC BLOOD PRESSURE: 124 MMHG | TEMPERATURE: 98 F | BODY MASS INDEX: 24.68 KG/M2 | HEIGHT: 65 IN

## 2025-02-27 DIAGNOSIS — G89.29 CHRONIC RIGHT SACROILIAC PAIN: ICD-10-CM

## 2025-02-27 DIAGNOSIS — M81.0 OSTEOPOROSIS, UNSPECIFIED OSTEOPOROSIS TYPE, UNSPECIFIED PATHOLOGICAL FRACTURE PRESENCE: ICD-10-CM

## 2025-02-27 DIAGNOSIS — N18.31 CHRONIC KIDNEY DISEASE, STAGE 3A: ICD-10-CM

## 2025-02-27 DIAGNOSIS — Z86.0101 HISTORY OF ADENOMATOUS POLYP OF COLON: ICD-10-CM

## 2025-02-27 DIAGNOSIS — M53.3 CHRONIC RIGHT SACROILIAC PAIN: ICD-10-CM

## 2025-02-27 DIAGNOSIS — G89.29 CHRONIC RIGHT-SIDED LOW BACK PAIN WITHOUT SCIATICA: Primary | ICD-10-CM

## 2025-02-27 DIAGNOSIS — M54.50 CHRONIC RIGHT-SIDED LOW BACK PAIN WITHOUT SCIATICA: Primary | ICD-10-CM

## 2025-02-27 PROCEDURE — 99214 OFFICE O/P EST MOD 30 MIN: CPT | Mod: S$GLB,,, | Performed by: FAMILY MEDICINE

## 2025-02-27 PROCEDURE — 3288F FALL RISK ASSESSMENT DOCD: CPT | Mod: CPTII,S$GLB,, | Performed by: FAMILY MEDICINE

## 2025-02-27 PROCEDURE — 99999 PR PBB SHADOW E&M-EST. PATIENT-LVL V: CPT | Mod: PBBFAC,,, | Performed by: FAMILY MEDICINE

## 2025-02-27 PROCEDURE — 3074F SYST BP LT 130 MM HG: CPT | Mod: CPTII,S$GLB,, | Performed by: FAMILY MEDICINE

## 2025-02-27 PROCEDURE — 3078F DIAST BP <80 MM HG: CPT | Mod: CPTII,S$GLB,, | Performed by: FAMILY MEDICINE

## 2025-02-27 PROCEDURE — 1159F MED LIST DOCD IN RCRD: CPT | Mod: CPTII,S$GLB,, | Performed by: FAMILY MEDICINE

## 2025-02-27 PROCEDURE — 1157F ADVNC CARE PLAN IN RCRD: CPT | Mod: CPTII,S$GLB,, | Performed by: FAMILY MEDICINE

## 2025-02-27 PROCEDURE — 1126F AMNT PAIN NOTED NONE PRSNT: CPT | Mod: CPTII,S$GLB,, | Performed by: FAMILY MEDICINE

## 2025-02-27 PROCEDURE — G2211 COMPLEX E/M VISIT ADD ON: HCPCS | Mod: S$GLB,,, | Performed by: FAMILY MEDICINE

## 2025-02-27 PROCEDURE — 1101F PT FALLS ASSESS-DOCD LE1/YR: CPT | Mod: CPTII,S$GLB,, | Performed by: FAMILY MEDICINE

## 2025-02-27 NOTE — PATIENT INSTRUCTIONS
Endoscopy scheduling will contact you to schedule colonoscopy, if needed, you may call them at [phone] for questions or concerns.      Call [phone] to schedule neuro follow up

## 2025-02-28 NOTE — PROGRESS NOTES
History of Present Illness    Mr. Patel reports pain in his back, specifically on the right-hand side near his waist. The pain is exacerbated when walking, causing him to bend over. He seeks remedies without having to go to therapy. Sitting down, especially with a cushion, provides some relief. He has had this back problem for at least 9 months, as reported in a previous visit in September.    He mentions difficulty reaching up to put things on hangers, indicating a decrease in his range of motion. He feels like he is decreasing in height and now needs to use small steps or a ladder to reach higher places in closets.    He has undergone physical therapy for his back issues in the past. An X-ray showed a small compression fracture on the L1 vertebra, likely old. A bone density scan showed osteopenia, but he is being treated for osteoporosis due to the compression fracture.    He was evaluated by a neurologist in March of last year, with discussions about potential Parkinsonism due to gait issues and mild bradykinesia.  He was supposed to have follow-up with neuropsych testing and follow-up appointment. No formal diagnosis of Parkinson's disease was made at that time.    He reports no issues with eating or swallowing. He has had some weight loss over the past couple of years, dropping from about 167 lbs 3 years ago to his current weight of 148 lbs.    He mentions having trouble holding onto things and frequently dropping objects. He denies pain running down his legs, leg pain while walking, or feeling tired and having to stop due to leg pain. He denies having any family history of colon cancer.  He is interested in another colonoscopy for surveillance due to last colonoscopy having multiple adenomatous polyps.    CKD 3A asymptomatic.      ROS:  General: -fatigue, +weight loss  Musculoskeletal: -joint pain, +back pain  Neurological: -tremors           Alexander was seen today for back pain.    Diagnoses and all orders  for this visit:    Chronic right-sided low back pain without sciatica  -     Ambulatory referral/consult to Pain Clinic; Future    Chronic right sacroiliac pain  -     Ambulatory referral/consult to Pain Clinic; Future    Chronic kidney disease, stage 3a    Osteoporosis, unspecified osteoporosis type, unspecified pathological fracture presence    History of adenomatous polyp of colon  -     Ambulatory referral/consult to Endo Procedure ; Future    He will continue with the alendronate.  Arrange colonoscopy.  He will schedule follow-up with Neurology.  Refer to pain management.    Reviewed recent borderline testosterone readings.- patient is not interested supplementation at this time.        I spent a total of 30 minutes on the day of the visit.  This includes face to face time and non-face to face time preparing to see the patient (eg, review of tests), obtaining and/or reviewing separately obtained history, documenting clinical information in the electronic or other health record, independently interpreting results and communicating results to the patient/family/caregiver, or care coordinator.      This note was generated with the assistance of ambient listening technology. Verbal consent was obtained by the patient and accompanying visitor(s) for the recording of patient appointment to facilitate this note. I attest to having reviewed and edited the generated note for accuracy, though some syntax or spelling errors may persist. Please contact the author of this note for any clarification.        Past Medical History:  Past Medical History:   Diagnosis Date    Acute idiopathic gout of multiple sites 02/23/2022    Atherosclerosis of aorta 04/13/2021    He has atherosclerosis noted on imaging in the past and no tx is needed at this time.    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 This is followed by cardiology now.    Cardiac  pacemaker in situ 04/13/2021    CKD (chronic kidney disease) stage 3, GFR 30-59 ml/min 12/25/2017    Alexander Patel has an Estimated Glumerular Filtration Rate (EGFR) between 30 and 59 consistent with the definition of chronic kidney disease stage 3.  eGFR if non   Date Value Ref Range Status  04/23/2018 53.8 (A) >60 mL/min/1.73 m^2 Final    Comment:    Calculation used to obtain the estimated glomerular filtration rate (eGFR) is the CKD-EPI equation.       Lab Results  Component     Colon polyp 10/08/2015    Diverticulosis of large intestine without hemorrhage 10/08/2015    Essential hypertension 08/28/2015    Gout 2012    Hyperlipidemia LDL goal < 130     Mild obstructive sleep apnea 02/16/2023    Orthostatic hypotension 07/14/2020    Osteoporosis 2/27/2025    PAF (paroxysmal atrial fibrillation) 12/30/2020    He had an episode of afib in the past and he has not required anticoagulation and is not needing rate control.  He has not had stroke symptoms.    Second degree heart block 08/23/2019    Tubular adenoma of colon 02/25/2021     Past Surgical History:   Procedure Laterality Date    A-V CARDIAC PACEMAKER INSERTION N/A 8/23/2019    Procedure: INSERTION, CARDIAC PACEMAKER, DUAL CHAMBER;  Surgeon: Manas Meade MD;  [facility] CATH;  Service: Cardiology;  Laterality: N/A;    COLONOSCOPY N/A 10/8/2015    Procedure: COLONOSCOPY;  Surgeon: Avinash Lamb MD;  Location: [facility] ENDO;  Service: Endoscopy;  Laterality: N/A;    COLONOSCOPY N/A 2/25/2021    Procedure: COLONOSCOPY;  Surgeon: Avinash Lamb MD;  Location: [facility] ENDO;  Service: Endoscopy;  Laterality: N/A;    FRACTURE SURGERY      hand     Review of patient's allergies indicates:   Allergen Reactions    No known drug allergies      Medications Ordered Prior to Encounter[1]  Social History[2]  Family History   Problem Relation Name Age of Onset    Heart disease Mother      Lymphoma Mother      Colon polyps Mother      Alcohol  "abuse Father      Emphysema Father      Lymphoma Brother           Vitals:    02/27/25 1047 02/27/25 1121   BP: (!) 145/78 124/70   Pulse: (!) (P) 59    Temp: 98 °F (36.7 °C)    TempSrc: Temporal    Weight: 67.2 kg (148 lb 1.6 oz)    Height: 5' 5" (1.651 m)        Wt Readings from Last 3 Encounters:   02/27/25 67.2 kg (148 lb 1.6 oz)   01/08/25 69.4 kg (153 lb)   10/09/24 69.7 kg (153 lb 11.2 oz)       APPEARANCE: Well nourished, well developed, in no acute distress.    HEAD: Normocephalic.  Atraumatic.  EYES:   Right eye: Pupil reactive.  Conjunctiva clear.    Left eye: Pupil reactive.  Conjunctiva clear.    NECK: Supple. No bruits.  No JVD.  No cervical lymphadenopathy.  No thyromegaly.    CHEST: Breath sounds clear bilaterally.  Normal respiratory effort  CARDIOVASCULAR: Normal rate.  Regular rhythm.  No murmurs.  No rub.  No gallops.   No edema.  Back has some tenderness palpation at the right SI joint area.  He has kyphotic posture.  He has some decreased range motion.  Gait is slow.  He was stand on heels and toes.  There is no tenderness over the lumbar or thoracic spine.  MENTAL STATUS: Alert.  Oriented x 3.       [1]   Current Outpatient Medications on File Prior to Visit   Medication Sig Dispense Refill    acetaminophen (TYLENOL) 650 MG TbSR Take 1-2 tablets (650-1,300 mg total) by mouth every 8 (eight) hours.      alendronate (FOSAMAX) 70 MG tablet Take 1 tablet (70 mg total) by mouth every 7 days. 12 tablet 3    allopurinoL (ZYLOPRIM) 100 MG tablet TAKE 1&1/2 TABLETS BY MOUTH ONCE DAILY. 135 tablet 0    apixaban (ELIQUIS) 5 mg Tab Take 1 tablet (5 mg total) by mouth 2 (two) times daily. 60 tablet 11    cyanocobalamin (VITAMIN B-12) 1000 MCG tablet Take 1 tablet (1,000 mcg total) by mouth once daily.      GLUCOSAMINE HCL/CHONDR ALM A NA (OSTEO BI-FLEX ORAL) Take 2 tablets by mouth once daily.       multivit-min/FA/lycopen/lutein (CENTRUM SILVER MEN ORAL) Take 1 tablet by mouth once daily.      rosuvastatin " (CRESTOR) 10 MG tablet TAKE 1 TABLET BY MOUTH EVERY DAY 90 tablet 1    silodosin (RAPAFLO) 4 mg Cap capsule Take 1 capsule (4 mg total) by mouth once daily. 90 capsule 3     No current facility-administered medications on file prior to visit.   [2]   Social History  Socioeconomic History    Marital status:      Spouse name: Modesta    Number of children: 2   Occupational History    Occupation: retired   Tobacco Use    Smoking status: Never    Smokeless tobacco: Former     Types: Chew   Substance and Sexual Activity    Alcohol use: Yes     Comment: occasional    Drug use: No    Sexual activity: Yes

## 2025-03-03 ENCOUNTER — TELEPHONE (OUTPATIENT)
Dept: FAMILY MEDICINE | Facility: CLINIC | Age: 80
End: 2025-03-03
Payer: MEDICARE

## 2025-03-03 NOTE — TELEPHONE ENCOUNTER
----- Message from Sandra sent at 3/3/2025  2:05 PM CST -----  Contact: [phone]  [email or web address] Patient is calling for Medical Advice regarding:regarding a referral How long has patient had these symptoms:states she does not understand what they asking her to do Pharmacy name and phone#:Patient [facility] a call back or [facility]:call back Comments:Please advise Please advise patient replies from provider may take up to 48 hours.

## 2025-03-04 NOTE — TELEPHONE ENCOUNTER
Airway  Date/Time: 12/28/2021 11:58 AM  Urgency: elective      General Information and Staff    Patient location during procedure: OR  Anesthesiologist: Jordana Harman MD  Performed: anesthesiologist     Indications and Patient Condition  Indications Left message on voice mail to return call

## 2025-03-10 LAB
OHS CV AF BURDEN PERCENT: < 1
OHS CV DC REMOTE DEVICE TYPE: NORMAL
OHS CV RV PACING PERCENT: 22 %

## 2025-03-13 DIAGNOSIS — M10.072 ACUTE IDIOPATHIC GOUT OF LEFT FOOT: ICD-10-CM

## 2025-03-13 DIAGNOSIS — E79.0 HYPERURICEMIA: ICD-10-CM

## 2025-03-13 RX ORDER — ALLOPURINOL 100 MG/1
150 TABLET ORAL
Qty: 135 TABLET | Refills: 2 | Status: SHIPPED | OUTPATIENT
Start: 2025-03-13

## 2025-03-13 RX ORDER — ROSUVASTATIN CALCIUM 10 MG/1
10 TABLET, COATED ORAL
Qty: 90 TABLET | Refills: 2 | Status: SHIPPED | OUTPATIENT
Start: 2025-03-13

## 2025-03-13 NOTE — TELEPHONE ENCOUNTER
Refill Decision Note   Alexander Patel  is requesting a refill authorization.  Brief Assessment and Rationale for Refill:  Approve     Medication Therapy Plan:         Comments:     Note composed:7:15 AM 03/13/2025

## 2025-03-13 NOTE — TELEPHONE ENCOUNTER
Refill Decision Note   Alexander Patel  is requesting a refill authorization.  Brief Assessment and Rationale for Refill:  Approve     Medication Therapy Plan:       Medication Reconciliation Completed: No   Comments:     No Care Gaps recommended.     Note composed:1:18 PM 03/13/2025

## 2025-03-13 NOTE — TELEPHONE ENCOUNTER
Care Due:                  Date            Visit Type   Department     Provider  --------------------------------------------------------------------------------                                MYCHART                              FOLLOWUP/OF  [facility] FAMILY  Last Visit: 02-      FICE VISIT   MEDICINE       Jai Mcmahon  Next Visit: None Scheduled  None         None Found                                                            Last  Test          Frequency    Reason                     Performed    Due Date  --------------------------------------------------------------------------------    Mg Level....  12 months..  alendronate..............  Not Found    Overdue    [employer] Embedded Care Due Messages. Reference number: [id number].   3/13/2025 1:31:35 AM CDT

## 2025-03-13 NOTE — TELEPHONE ENCOUNTER
No care due was identified.  [facility] Embedded Care Due Messages. Reference number: [id number].   3/13/2025 10:36:08 AM CDT

## 2025-03-24 DIAGNOSIS — Z00.00 ENCOUNTER FOR MEDICARE ANNUAL WELLNESS EXAM: ICD-10-CM

## 2025-03-31 ENCOUNTER — TELEPHONE (OUTPATIENT)
Dept: FAMILY MEDICINE | Facility: CLINIC | Age: 80
End: 2025-03-31
Payer: MEDICARE

## 2025-03-31 ENCOUNTER — TELEPHONE (OUTPATIENT)
Dept: PREADMISSION TESTING | Facility: HOSPITAL | Age: 80
End: 2025-03-31
Payer: MEDICARE

## 2025-03-31 NOTE — TELEPHONE ENCOUNTER
----- Message from cloudswave sent at 3/31/2025 11:37 AM CDT -----  Contact: JARRET HILL [[id number]]  ..Type:  Patient Requesting CallWho Called:JARRET HILL [[id number]]Would the patient rather a call back or a response via [facility]? [email or web address] Call Back Number:.[phone] (home) Additional Information: Patient called to schedule a colonoscopy with provider.

## 2025-04-08 ENCOUNTER — HOSPITAL ENCOUNTER (OUTPATIENT)
Dept: PREADMISSION TESTING | Facility: HOSPITAL | Age: 80
Discharge: HOME | End: 2025-04-08
Attending: FAMILY MEDICINE
Payer: MEDICARE

## 2025-04-08 ENCOUNTER — E-CONSULT (OUTPATIENT)
Dept: CARDIOLOGY | Facility: HOSPITAL | Age: 80
End: 2025-04-08
Payer: MEDICARE

## 2025-04-08 DIAGNOSIS — Z86.0101 HISTORY OF ADENOMATOUS POLYP OF COLON: Primary | ICD-10-CM

## 2025-04-08 DIAGNOSIS — Z01.810 PREOP CARDIOVASCULAR EXAM: Primary | ICD-10-CM

## 2025-04-08 RX ORDER — SODIUM, POTASSIUM,MAG SULFATES 17.5-3.13G
1 SOLUTION, RECONSTITUTED, ORAL ORAL DAILY
Qty: 1 KIT | Refills: 0 | Status: SHIPPED | OUTPATIENT
Start: 2025-04-08 | End: 2025-04-10

## 2025-04-09 NOTE — CONSULTS
[facility]  Response for E-Consult     Patient Name: Alexander Patel  MRN: [id number]  Primary Care Provider: Jai Mcmahon MD   Requesting Provider: Jacklyn Huber NP  E-Consult to General Cardiology  Consult performed by: Ryan Stern MD  Consult ordered by: Jacklyn Huber NP           79 yo male, E consult for preop clearance of colonoscopy  The chart reviewed.  PMH HTN HLD PAF S/P PPM  Cr 1.5 GFR 47    Plan  Elevated periop risk of CV events for non-high risk procedure.  Ok to proceed the scheduled procedure without further cardiac study.  OK to hold eliquis 2 days before the procedure and resume postop once hemodynamically stable      Total time of Consultation: 10 minute    I did not speak to the requesting provider verbally about this.     *This eConsult is based on the clinical data available to me and is furnished without benefit of a physical examination. The eConsult will need to be interpreted in light of any clinical issues or changes in patient status not available to me at the time of filing this eConsults. Significant changes in patient condition or level of acuity should result in immediate formal consultation and reevaluation. Please alert me if you have further questions.    Thank you for this eConsult referral.     Ryan Stern MD  [facility]ARDIOLOG[facility]

## 2025-04-22 ENCOUNTER — TELEPHONE (OUTPATIENT)
Dept: PREADMISSION TESTING | Facility: HOSPITAL | Age: 80
End: 2025-04-22
Payer: MEDICARE

## 2025-04-24 ENCOUNTER — TELEPHONE (OUTPATIENT)
Dept: CARDIOLOGY | Facility: CLINIC | Age: 80
End: 2025-04-24
Payer: MEDICARE

## 2025-04-24 NOTE — TELEPHONE ENCOUNTER
----- Message from [employer] sent at 4/24/2025 11:38 AM CDT -----  Type:  Sooner Appointment Request Caller is requesting a sooner appointment.  Caller accepted first available appointment listed below.  Caller will not accept being placed on the waitlist and is requesting a message be sent to doctor. Name of Caller:PT'S WIFE - REANNA When is the first available appointment? 8/21 Symptoms:FOLLOW UP APPT Would the patient rather a call back or a response via [facility]? CALL BACK Best Call Back Number:[phone] Additional Information: REQUESTING A SOONER APPT;     Please call Back to advise. Thanks!

## 2025-04-29 ENCOUNTER — HOSPITAL ENCOUNTER (OUTPATIENT)
Dept: ENDOSCOPY | Facility: HOSPITAL | Age: 80
Discharge: HOME | End: 2025-04-29
Attending: FAMILY MEDICINE | Admitting: FAMILY MEDICINE
Payer: MEDICARE

## 2025-04-29 ENCOUNTER — ANESTHESIA (OUTPATIENT)
Dept: ENDOSCOPY | Facility: HOSPITAL | Age: 80
End: 2025-04-29
Payer: MEDICARE

## 2025-04-29 ENCOUNTER — ANESTHESIA EVENT (OUTPATIENT)
Dept: ENDOSCOPY | Facility: HOSPITAL | Age: 80
End: 2025-04-29
Payer: MEDICARE

## 2025-04-29 VITALS
HEIGHT: 65 IN | BODY MASS INDEX: 24.99 KG/M2 | TEMPERATURE: 98 F | DIASTOLIC BLOOD PRESSURE: 69 MMHG | HEART RATE: 60 BPM | WEIGHT: 150 LBS | RESPIRATION RATE: 18 BRPM | SYSTOLIC BLOOD PRESSURE: 130 MMHG | OXYGEN SATURATION: 99 %

## 2025-04-29 DIAGNOSIS — Z12.11 COLON CANCER SCREENING: Primary | ICD-10-CM

## 2025-04-29 DIAGNOSIS — Z86.0100 HISTORY OF COLON POLYPS: ICD-10-CM

## 2025-04-29 DIAGNOSIS — K63.5 POLYP OF COLON, UNSPECIFIED PART OF COLON, UNSPECIFIED TYPE: ICD-10-CM

## 2025-04-29 DIAGNOSIS — K57.30 DIVERTICULOSIS OF COLON: ICD-10-CM

## 2025-04-29 DIAGNOSIS — K57.30 DIVERTICULOSIS OF LARGE INTESTINE WITHOUT HEMORRHAGE: ICD-10-CM

## 2025-04-29 DIAGNOSIS — Z86.0101 HISTORY OF ADENOMATOUS POLYP OF COLON: ICD-10-CM

## 2025-04-29 PROCEDURE — 88305 TISSUE EXAM BY PATHOLOGIST: CPT | Mod: TC | Performed by: FAMILY MEDICINE

## 2025-04-29 PROCEDURE — 63600175 PHARM REV CODE 636 W HCPCS: Performed by: NURSE ANESTHETIST, CERTIFIED REGISTERED

## 2025-04-29 PROCEDURE — 37000009 HC ANESTHESIA EA ADD 15 MINS

## 2025-04-29 PROCEDURE — 37000008 HC ANESTHESIA 1ST 15 MINUTES

## 2025-04-29 PROCEDURE — 27201089 HC SNARE, DISP (ANY): Performed by: FAMILY MEDICINE

## 2025-04-29 RX ORDER — LIDOCAINE HYDROCHLORIDE 10 MG/ML
INJECTION, SOLUTION EPIDURAL; INFILTRATION; INTRACAUDAL; PERINEURAL
Status: DISCONTINUED | OUTPATIENT
Start: 2025-04-29 | End: 2025-04-29

## 2025-04-29 RX ORDER — PROPOFOL 10 MG/ML
VIAL (ML) INTRAVENOUS
Status: DISCONTINUED | OUTPATIENT
Start: 2025-04-29 | End: 2025-04-29

## 2025-04-29 RX ADMIN — LIDOCAINE HYDROCHLORIDE 50 MG: 10 SOLUTION INTRAVENOUS at 10:04

## 2025-04-29 RX ADMIN — PROPOFOL 30 MG: 10 INJECTION, EMULSION INTRAVENOUS at 10:04

## 2025-04-29 RX ADMIN — PROPOFOL 20 MG: 10 INJECTION, EMULSION INTRAVENOUS at 10:04

## 2025-04-29 RX ADMIN — PROPOFOL 60 MG: 10 INJECTION, EMULSION INTRAVENOUS at 10:04

## 2025-04-29 NOTE — ANESTHESIA PREPROCEDURE EVALUATION
04/29/2025  Alexander Patel is a 80 y.o., male.      Pre-op Assessment    I have reviewed the Patient Summary Reports.    I have reviewed the NPO Status.   I have reviewed the Medications.     Review of Systems  Anesthesia Hx:  No problems with previous Anesthesia                Social:  Non-Smoker, No Alcohol Use       Hematology/Oncology:    Oncology Normal    -- Anemia:                                  EENT/Dental:  EENT/Dental Normal           Cardiovascular:    Pacemaker Hypertension    Dysrhythmias atrial fibrillation      hyperlipidemia        Cardiac clearance per Dr. Stern.    Plan  Elevated periop risk of CV events for non-high risk procedure.  Ok to proceed the scheduled procedure without further cardiac study.  OK to hold eliquis 2 days before the procedure and resume postop once hemodynamically stable                           Pulmonary:        Sleep Apnea                Renal/:  Chronic Renal Disease, CKD  BPH              Hepatic/GI:  Hepatic/GI Normal Bowel Prep.                   Neurological:   CVA                                    Endocrine:  Endocrine Normal            Dermatological:  Skin Normal    Psych:  Psychiatric Normal                  Physical Exam  General: Well nourished, Cooperative, Alert and Oriented    Airway:  Mallampati: II   Mouth Opening: Normal  TM Distance: Normal  Tongue: Normal  Neck ROM: Normal ROM    Dental:  Intact, Partial Dentures        Anesthesia Plan  Type of Anesthesia, risks & benefits discussed:    Anesthesia Type: MAC  Intra-op Monitoring Plan: Standard ASA Monitors  Post Op Pain Control Plan: IV/PO Opioids PRN  Informed Consent: Informed consent signed with the Patient and all parties understand the risks and agree with anesthesia plan.  All questions answered.   ASA Score: 3  Day of Surgery Review of History & Physical: H&P Update referred to the  surgeon/provider.    Ready For Surgery From Anesthesia Perspective.     .

## 2025-04-29 NOTE — TRANSFER OF CARE
"Anesthesia Transfer of Care Note    Patient: Alexander Patel    Procedure(s) Performed: * No procedures listed *    Patient location: GI    Anesthesia Type: MAC    Transport from OR: Transported from OR on room air with adequate spontaneous ventilation    Post pain: adequate analgesia    Post assessment: no apparent anesthetic complications and tolerated procedure well    Post vital signs: stable    Level of consciousness: responds to stimulation    Nausea/Vomiting: no nausea/vomiting    Complications: none    Transfer of care protocol was followed      Last vitals: Visit Vitals  BP (!) 96/57 (BP Location: Left arm, Patient Position: Lying)   Pulse 60   Temp 36.7 °C (98 °F)   Resp 18   Ht 5' 5" (1.651 m)   Wt 68 kg (150 lb)   SpO2 100%   BMI 24.96 kg/m²     "

## 2025-04-29 NOTE — PLAN OF CARE
DR Lamb  at bedside to speak with pt and family  about the results of the procedure. All questions answered with no further questions at this time.

## 2025-04-29 NOTE — H&P
Short Stay Endoscopy History and Physical    PCP - Jai Mcmahon MD    Procedure - Colonoscopy  ASA - 2  Mallampati - per anesthesia  History of Anesthesia problems - no  Family history Anesthesia problems -  no     HPI:  This is a 80 y.o. male here for evaluation of :   Active Hospital Problems    Diagnosis  POA    *Colon cancer screening [Z12.11]  Not Applicable    Diverticulosis of large intestine without hemorrhage [K57.30]  Yes    History of colon polyps [Z86.0100]  Not Applicable      Resolved Hospital Problems   No resolved problems to display.         Health Maintenance         Date Due Completion Date    TETANUS VACCINE 04/28/2024 4/28/2014    Lipid Panel 10/16/2025 10/16/2024            Screening - Yes  History of polyps - Yes     Diarrhea - no  Anemia - no  Blood in stools - no  Abdominal pain - no  Other - no    ROS:  CONSTITUTIONAL: Denies weight change,  fatigue, fevers, chills, night sweats.  CARDIOVASCULAR: Denies chest pain, shortness of breath, orthopnea and edema.  RESPIRATORY: Denies cough, hemoptysis, dyspnea, and wheezing.  GI: See HPI.    Medical History:   Past Medical History:   Diagnosis Date    Acute idiopathic gout of multiple sites 02/23/2022    Atherosclerosis of aorta 04/13/2021    He has atherosclerosis noted on imaging in the past and no tx is needed at this time.    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 This is followed by cardiology now.    Cardiac pacemaker in situ 04/13/2021    CKD (chronic kidney disease) stage 3, GFR 30-59 ml/min 12/25/2017    Alexander Patel has an Estimated Glumerular Filtration Rate (EGFR) between 30 and 59 consistent with the definition of chronic kidney disease stage 3.  eGFR if non   Date Value Ref Range Status  04/23/2018 53.8 (A) >60 mL/min/1.73 m^2 Final    Comment:    Calculation used to obtain the estimated glomerular filtration rate (eGFR) is the CKD-EPI  equation.       Lab Results  Component     Colon polyp 10/08/2015    Diverticulosis of large intestine without hemorrhage 10/08/2015    Essential hypertension 08/28/2015    Gout 2012    Hyperlipidemia LDL goal < 130     Mild obstructive sleep apnea 02/16/2023    Orthostatic hypotension 07/14/2020    Osteoporosis 2/27/2025    PAF (paroxysmal atrial fibrillation) 12/30/2020    He had an episode of afib in the past and he has not required anticoagulation and is not needing rate control.  He has not had stroke symptoms.    Preop cardiovascular exam 4/8/2025    Second degree heart block 08/23/2019    Tubular adenoma of colon 02/25/2021       Surgical History:   Past Surgical History:   Procedure Laterality Date    A-V CARDIAC PACEMAKER INSERTION N/A 8/23/2019    Procedure: INSERTION, CARDIAC PACEMAKER, DUAL CHAMBER;  Surgeon: Manas Meade MD;  [facility] CATH;  Service: Cardiology;  Laterality: N/A;    COLONOSCOPY N/A 10/8/2015    Procedure: COLONOSCOPY;  Surgeon: Avinash Lamb MD;  Location: [facility] ENDO;  Service: Endoscopy;  Laterality: N/A;    COLONOSCOPY N/A 2/25/2021    Procedure: COLONOSCOPY;  Surgeon: Avinash Lamb MD;  Location: [facility] ENDO;  Service: Endoscopy;  Laterality: N/A;    FRACTURE SURGERY      hand       Family History:   Family History   Problem Relation Name Age of Onset    Heart disease Mother      Lymphoma Mother      Colon polyps Mother      Alcohol abuse Father      Emphysema Father      Lymphoma Brother         Social History:   Social History[1]    Allergies:   Review of patient's allergies indicates:   Allergen Reactions    No known drug allergies        Medications:   Medications Ordered Prior to Encounter[2]    Physical Exam:  Vital Signs:   Vitals:    04/29/25 0923   BP: (!) 167/79   Pulse: 60   Resp: 18   Temp: 97.9 °F (36.6 °C)     General Appearance: Well appearing in no acute distress  ENT: OP clear  Chest: CTA B  CV: RRR, no m/r/g  Abd: s/nt/nd/nabs  Ext: no  edema    Labs:Reviewed    IMP:  Active Hospital Problems    Diagnosis  POA    *Colon cancer screening [Z12.11]  Not Applicable    Diverticulosis of large intestine without hemorrhage [K57.30]  Yes    History of colon polyps [Z86.0100]  Not Applicable      Resolved Hospital Problems   No resolved problems to display.         Plan:   I have explained the risks and benefits of colonoscopy to the patient including but not limited to bleeding, perforation, infection, and death. The patient wishes to proceed.         [1]   Social History  Tobacco Use    Smoking status: Never    Smokeless tobacco: Former     Types: Chew   Substance Use Topics    Alcohol use: Yes     Comment: occasional    Drug use: No   [2]   Current Outpatient Medications on File Prior to Encounter   Medication Sig Dispense Refill    acetaminophen (TYLENOL) 650 MG TbSR Take 1-2 tablets (650-1,300 mg total) by mouth every 8 (eight) hours.      alendronate (FOSAMAX) 70 MG tablet Take 1 tablet (70 mg total) by mouth every 7 days. 12 tablet 3    allopurinoL (ZYLOPRIM) 100 MG tablet TAKE 1&1/2 TABLETS BY MOUTH ONCE DAILY. 135 tablet 2    cyanocobalamin (VITAMIN B-12) 1000 MCG tablet Take 1 tablet (1,000 mcg total) by mouth once daily.      GLUCOSAMINE HCL/CHONDR LAM A NA (OSTEO BI-FLEX ORAL) Take 2 tablets by mouth once daily.       multivit-min/FA/lycopen/lutein (CENTRUM SILVER MEN ORAL) Take 1 tablet by mouth once daily.      rosuvastatin (CRESTOR) 10 MG tablet TAKE 1 TABLET BY MOUTH EVERY DAY 90 tablet 2    silodosin (RAPAFLO) 4 mg Cap capsule Take 1 capsule (4 mg total) by mouth once daily. 90 capsule 3    apixaban (ELIQUIS) 5 mg Tab Take 1 tablet (5 mg total) by mouth 2 (two) times daily. 60 tablet 11     No current facility-administered medications on file prior to encounter.

## 2025-04-30 LAB
ESTROGEN SERPL-MCNC: NORMAL PG/ML
INSULIN SERPL-ACNC: NORMAL U[IU]/ML
LAB AP CLINICAL INFORMATION: NORMAL
LAB AP GROSS DESCRIPTION: NORMAL
LAB AP PERFORMING LOCATION(S): NORMAL
LAB AP REPORT FOOTNOTES: NORMAL

## 2025-05-06 ENCOUNTER — TELEPHONE (OUTPATIENT)
Dept: NEUROLOGY | Facility: CLINIC | Age: 80
End: 2025-05-06
Payer: MEDICARE

## 2025-05-06 NOTE — TELEPHONE ENCOUNTER
Called patient to reschedule 7/9/25 appointment with Dr. Thomson. Patient is incorrectly scheduled for a memory follow up. Will need to reschedule with NORA. No answer. Left message to return call.

## 2025-05-07 ENCOUNTER — OFFICE VISIT (OUTPATIENT)
Dept: CARDIOLOGY | Facility: CLINIC | Age: 80
End: 2025-05-07
Payer: MEDICARE

## 2025-05-07 VITALS
DIASTOLIC BLOOD PRESSURE: 86 MMHG | HEIGHT: 65 IN | HEART RATE: 59 BPM | BODY MASS INDEX: 24.1 KG/M2 | SYSTOLIC BLOOD PRESSURE: 181 MMHG | WEIGHT: 144.63 LBS

## 2025-05-07 DIAGNOSIS — R06.09 DOE (DYSPNEA ON EXERTION): ICD-10-CM

## 2025-05-07 DIAGNOSIS — Z86.73 HISTORY OF STROKE: ICD-10-CM

## 2025-05-07 DIAGNOSIS — I10 ESSENTIAL HYPERTENSION: ICD-10-CM

## 2025-05-07 DIAGNOSIS — I44.1 SECOND DEGREE HEART BLOCK: ICD-10-CM

## 2025-05-07 DIAGNOSIS — Z95.0 CARDIAC PACEMAKER IN SITU: ICD-10-CM

## 2025-05-07 DIAGNOSIS — N18.31 CHRONIC KIDNEY DISEASE, STAGE 3A: ICD-10-CM

## 2025-05-07 DIAGNOSIS — E78.5 HYPERLIPIDEMIA WITH TARGET LDL LESS THAN 130: ICD-10-CM

## 2025-05-07 DIAGNOSIS — I48.0 PAF (PAROXYSMAL ATRIAL FIBRILLATION): Primary | ICD-10-CM

## 2025-05-07 PROBLEM — Z01.810 PREOP CARDIOVASCULAR EXAM: Status: RESOLVED | Noted: 2025-04-08 | Resolved: 2025-05-07

## 2025-05-07 LAB
OHS QRS DURATION: 92 MS
OHS QTC CALCULATION: 410 MS

## 2025-05-07 PROCEDURE — 93005 ELECTROCARDIOGRAM TRACING: CPT | Mod: PO

## 2025-05-07 PROCEDURE — 1101F PT FALLS ASSESS-DOCD LE1/YR: CPT | Mod: CPTII,S$GLB,,

## 2025-05-07 PROCEDURE — 93010 ELECTROCARDIOGRAM REPORT: CPT | Mod: S$GLB,,, | Performed by: INTERNAL MEDICINE

## 2025-05-07 PROCEDURE — 3077F SYST BP >= 140 MM HG: CPT | Mod: CPTII,S$GLB,,

## 2025-05-07 PROCEDURE — 1157F ADVNC CARE PLAN IN RCRD: CPT | Mod: CPTII,S$GLB,,

## 2025-05-07 PROCEDURE — 3079F DIAST BP 80-89 MM HG: CPT | Mod: CPTII,S$GLB,,

## 2025-05-07 PROCEDURE — 1126F AMNT PAIN NOTED NONE PRSNT: CPT | Mod: CPTII,S$GLB,,

## 2025-05-07 PROCEDURE — 99999 PR PBB SHADOW E&M-EST. PATIENT-LVL III: CPT | Mod: PBBFAC,,,

## 2025-05-07 PROCEDURE — 99214 OFFICE O/P EST MOD 30 MIN: CPT | Mod: S$GLB,,,

## 2025-05-07 PROCEDURE — 3288F FALL RISK ASSESSMENT DOCD: CPT | Mod: CPTII,S$GLB,,

## 2025-05-07 PROCEDURE — 1159F MED LIST DOCD IN RCRD: CPT | Mod: CPTII,S$GLB,,

## 2025-05-07 NOTE — PROGRESS NOTES
[facility]  Date: 5/7/25    Patient: Alexander Patel, 1945, [id number]  Primary Care Provider: Jai Mcmahon MD     Chief Complaint: Fatigue and shortness of breath     Subjective:       Alexander Patel is a 80 y.o. male who presents for evaluation of fatigue and shortness of breath. They follow with Dr. Meade.    CBC, CMP, lipid panel stable. At last office visit, patient doing well from cardiac standpoint for which medications were continued as is.     Since then, patient reports progressive fatigue and URENA for the past 3-4 months. Average SBP 80-130s at home. Weight down-trending. Wife notes unintentional weight loss of 10 lbs within the last 4 months. Has been evaluated for THERESE in the past without need for CPAP. Denies chest discomfort, palpitations, dizziness, syncope, orthopnea, PND, edema, reduced appetite, hemoptysis, hematuria, melena, hematochezia.    Focused Past History includes:  Paroxysmal atrial fibrillation  Nuclear stress 7/2023: no ischemia or infarct  TTE 7/2023: LVEF 60%, grade I DD, mild LAE, trace AR, mild MR, PASP 30  Second degree heart block s/p PPM  Device check 2/2025: 0% AT/AF, 23% Ap, 92%   Essential hypertension  Hyperlipidemia   History of stroke  Chronic kidney disease, stage 3a  Family history of cancer  Mother and brother with unknown cancer    Current Outpatient Medications   Medication Sig    acetaminophen (TYLENOL) 650 MG TbSR Take 1-2 tablets (650-1,300 mg total) by mouth every 8 (eight) hours.    alendronate (FOSAMAX) 70 MG tablet Take 1 tablet (70 mg total) by mouth every 7 days.    allopurinoL (ZYLOPRIM) 100 MG tablet TAKE 1&1/2 TABLETS BY MOUTH ONCE DAILY.    apixaban (ELIQUIS) 5 mg Tab Take 1 tablet (5 mg total) by mouth 2 (two) times daily.    cyanocobalamin (VITAMIN B-12) 1000 MCG tablet Take 1 tablet (1,000 mcg total) by mouth once daily.    GLUCOSAMINE HCL/CHONDR LAM A NA (OSTEO BI-FLEX ORAL) Take 2 tablets by mouth once daily.      multivit-min/FA/lycopen/lutein (CENTRUM SILVER MEN ORAL) Take 1 tablet by mouth once daily.    rosuvastatin (CRESTOR) 10 MG tablet TAKE 1 TABLET BY MOUTH EVERY DAY    silodosin (RAPAFLO) 4 mg Cap capsule Take 1 capsule (4 mg total) by mouth once daily.     No current facility-administered medications for this visit.            Objective       Review of Systems  Constitutional: negative for fevers, night sweats, and weight loss  Eyes: negative for visual disturbance, diplopia  Respiratory: negative for cough, hemoptysis, sputum, and wheezing  Cardiovascular: see HPI  Gastrointestinal: negative for abdominal pain, bright red blood per rectum, change in bowel habits, dysphagia, melena, and reflux symptoms  Genitourinary:negative for dysuria, frequency, and hematuria  Hematologic/lymphatic: negative for bleeding, easy bruising, and lymphadenopathy  Musculoskeletal:negative for arthralgias, back pain, and myalgias  Neurological: negative for gait problems, paresthesia, speech problems, vertigo, and weakness  Behavioral/Psych: negative for excessive alcohol consumption, illegal drug usage, and sleep disturbance    -------------------------------------    Acute idiopathic gout of multiple sites    Atherosclerosis of aorta    He has atherosclerosis noted on imaging in the past and no tx is needed at this time.    BPH (benign prostatic hyperplasia)    Bradycardia    He has bradycardia and he had to get a pacemaker. This is followed by cardiology now.    Cardiac pacemaker in situ    CKD (chronic kidney disease) stage 3, GFR 30-59 ml/min    Alexander Patel has an Estimated Glumerular Filtration Rate (EGFR) between 30 and 59 consistent with the definition of chronic kidney disease stage 3.  eGFR if non   Date Value Ref Range Status  04/23/2018 53.8 (A) >60 mL/min/1.73 m^2 Final    Comment:    Calculation used to obtain the estimated glomerular filtration rate (eGFR) is the CKD-EPI equation.       Lab Results   "Component     Colon polyp    Diverticulosis of large intestine without hemorrhage    Essential hypertension    Gout    Hyperlipidemia LDL goal < 130    Mild obstructive sleep apnea    Orthostatic hypotension    Osteoporosis    PAF (paroxysmal atrial fibrillation)    He had an episode of afib in the past and he has not required anticoagulation and is not needing rate control.  He has not had stroke symptoms.    Preop cardiovascular exam    Second degree heart block    Tubular adenoma of colon     ----------------------------    A-v cardiac pacemaker insertion    Procedure: INSERTION, CARDIAC PACEMAKER, DUAL CHAMBER;  Surgeon: Manas Meade MD;  Location: [facility] CATH;  Service: Cardiology;  Laterality: N/A;    Colonoscopy    Procedure: COLONOSCOPY;  Surgeon: Avinash Lamb MD;  Location: [facility] [place] ENDO;  Service: Endoscopy;  Laterality: N/A;    Colonoscopy    Procedure: COLONOSCOPY;  Surgeon: Avinash Labm MD;  Location: [facility] [place] ENDO;  Service: Endoscopy;  Laterality: N/A;    Fracture surgery    hand        Family History   Problem Relation Name Age of Onset    Heart disease Mother      Lymphoma Mother      Colon polyps Mother      Alcohol abuse Father      Emphysema Father      Lymphoma Brother       Social History[1]    Physical Exam  BP (!) 181/86 (Patient Position: Sitting)   Pulse (!) 59   Ht 5' 5" (1.651 m)   Wt 65.6 kg (144 lb 10 oz)   BMI 24.07 kg/m²   Body surface area is 1.73 meters squared.  Body mass index is 24.07 kg/m².    General appearance: alert, appears stated age, cooperative, and no distress  Head: Normocephalic, without obvious abnormality, atraumatic  Neck: no carotid bruit, no JVD, and supple, symmetrical, trachea midline  Lungs: clear to auscultation bilaterally  Heart: regular rate and rhythm; S1, S2 normal, no murmur, click, rub or gallop  Abdomen: soft, non-tender, no distended  Extremities: extremities atraumatic, no pitting edema  Skin: warm, no cyanosis, no pathologic ecchymosis " in exposed portions  Neurologic: Grossly normal. A&O x3      Lab Review   Lab Results   Component Value Date    WBC 6.24 10/16/2024    HGB 11.6 (L) 10/16/2024    HCT 35.6 (L) 10/16/2024    MCV 96 10/16/2024     10/16/2024         BMP  Lab Results   Component Value Date     10/16/2024    K 4.4 10/16/2024     10/16/2024    CO2 24 10/16/2024    BUN 22 10/16/2024    CREATININE 1.5 (H) 10/16/2024    CALCIUM 9.5 10/16/2024    ANIONGAP 9 10/16/2024    ESTGFRAFRICA 50.8 (A) 06/30/2022    EGFRNONAA 44.0 (A) 06/30/2022       Lab Results   Component Value Date    ALBUMIN 4.1 10/16/2024       Lab Results   Component Value Date    ALT 40 10/16/2024    AST 35 10/16/2024    ALKPHOS 67 10/16/2024    BILITOT 0.7 10/16/2024       Lab Results   Component Value Date    TSH 1.269 02/17/2023       Lab Results   Component Value Date    CHOL 133 10/16/2024    CHOL 144 12/14/2023    CHOL 200 (H) 02/17/2023     Lab Results   Component Value Date    HDL 49 10/16/2024    HDL 50 12/14/2023    HDL 50 02/17/2023     Lab Results   Component Value Date    LDLCALC 62.0 (L) 10/16/2024    LDLCALC 71.0 12/14/2023    LDLCALC 134.0 02/17/2023     Lab Results   Component Value Date    TRIG 110 10/16/2024    TRIG 115 12/14/2023    TRIG 80 02/17/2023     Lab Results   Component Value Date    CHOLHDL 36.8 10/16/2024    CHOLHDL 34.7 12/14/2023    CHOLHDL 25.0 02/17/2023                Assessment & Plan:     This is a 80 y.o. male with PMHx of PAF, AVB s/p PPM, HTN, HLD, history of CVA, CKD. Reports fatigue,     1. URENA, fatigue  - New onset, progressive   - EKG without ischemia  - PPM without evidence of arrhythmias   - TTE and nuclear stress for further evaluation  - If workup negative, recommend further evaluation by primary care for possible cancer given unintentional weight loss, fatigue, and family history of cancer.    2. Paroxysmal atrial fibrillation  - Rate controlled today in clinic  - Pacemaker without evidence of AT/AF for > 6  months   - Continue apixaban 5 mg BID    3. Second degree heart block s/p PPM  - Normal device function    4. Essential hypertension  - Controlled off antihypertensives  - Monitor     5. Hyperlipidemia   - LDL 62, HDL 49  - Continue rosuvastatin 10 mg qd     6. History of stroke  - Asymptomatic     7. Chronic kidney disease, stage 3a  - Cr stable at 1.5  - Continue routine labs and management per primary care       Emphasized the importance of modifying lifestyle related risk factors including limiting alcohol intake, exercise, diet most resembling a Mediterranean diet.    Please follow up as scheduled or sooner if needed.      [facility] [place]  Office: [phone]                 [1]   Social History  Tobacco Use    Smoking status: Never    Smokeless tobacco: Former     Types: Chew   Substance Use Topics    Alcohol use: Yes     Comment: occasional    Drug use: No

## 2025-05-09 ENCOUNTER — RESULTS FOLLOW-UP (OUTPATIENT)
Dept: FAMILY MEDICINE | Facility: CLINIC | Age: 80
End: 2025-05-09

## 2025-05-09 ENCOUNTER — LAB VISIT (OUTPATIENT)
Dept: LAB | Facility: HOSPITAL | Age: 80
End: 2025-05-09
Attending: FAMILY MEDICINE
Payer: MEDICARE

## 2025-05-09 ENCOUNTER — OFFICE VISIT (OUTPATIENT)
Dept: FAMILY MEDICINE | Facility: CLINIC | Age: 80
End: 2025-05-09
Payer: MEDICARE

## 2025-05-09 VITALS
HEIGHT: 65 IN | WEIGHT: 143.5 LBS | DIASTOLIC BLOOD PRESSURE: 70 MMHG | SYSTOLIC BLOOD PRESSURE: 130 MMHG | BODY MASS INDEX: 23.91 KG/M2 | HEART RATE: 75 BPM | TEMPERATURE: 98 F

## 2025-05-09 DIAGNOSIS — R41.3 OTHER AMNESIA: ICD-10-CM

## 2025-05-09 DIAGNOSIS — D64.9 CHRONIC ANEMIA: ICD-10-CM

## 2025-05-09 DIAGNOSIS — G89.29 CHRONIC RIGHT-SIDED LOW BACK PAIN WITHOUT SCIATICA: ICD-10-CM

## 2025-05-09 DIAGNOSIS — Z95.0 CARDIAC PACEMAKER IN SITU: ICD-10-CM

## 2025-05-09 DIAGNOSIS — Z86.73 HISTORY OF STROKE: ICD-10-CM

## 2025-05-09 DIAGNOSIS — G31.84 MILD NEUROCOGNITIVE DISORDER: ICD-10-CM

## 2025-05-09 DIAGNOSIS — S32.010S CLOSED COMPRESSION FRACTURE OF L1 VERTEBRA, SEQUELA: ICD-10-CM

## 2025-05-09 DIAGNOSIS — M81.0 OSTEOPOROSIS, UNSPECIFIED OSTEOPOROSIS TYPE, UNSPECIFIED PATHOLOGICAL FRACTURE PRESENCE: ICD-10-CM

## 2025-05-09 DIAGNOSIS — R63.4 WEIGHT LOSS: ICD-10-CM

## 2025-05-09 DIAGNOSIS — N18.31 CHRONIC KIDNEY DISEASE, STAGE 3A: ICD-10-CM

## 2025-05-09 DIAGNOSIS — R26.9 GAIT DISTURBANCE: ICD-10-CM

## 2025-05-09 DIAGNOSIS — D12.6 TUBULAR ADENOMA OF COLON: ICD-10-CM

## 2025-05-09 DIAGNOSIS — R63.4 WEIGHT LOSS: Primary | ICD-10-CM

## 2025-05-09 DIAGNOSIS — M10.9 GOUT, UNSPECIFIED CAUSE, UNSPECIFIED CHRONICITY, UNSPECIFIED SITE: ICD-10-CM

## 2025-05-09 DIAGNOSIS — M54.50 CHRONIC RIGHT-SIDED LOW BACK PAIN WITHOUT SCIATICA: ICD-10-CM

## 2025-05-09 DIAGNOSIS — D47.2 MONOCLONAL PARAPROTEINEMIA: Primary | ICD-10-CM

## 2025-05-09 PROBLEM — Z12.11 COLON CANCER SCREENING: Status: RESOLVED | Noted: 2025-04-29 | Resolved: 2025-05-09

## 2025-05-09 PROBLEM — K63.5 COLON POLYPS: Status: RESOLVED | Noted: 2025-04-29 | Resolved: 2025-05-09

## 2025-05-09 LAB
ABSOLUTE EOSINOPHIL (OHS): 0.1 K/UL
ABSOLUTE MONOCYTE (OHS): 0.53 K/UL (ref 0.3–1)
ABSOLUTE NEUTROPHIL COUNT (OHS): 3.63 K/UL (ref 1.8–7.7)
ALBUMIN SERPL BCP-MCNC: 4 G/DL (ref 3.5–5.2)
ALP SERPL-CCNC: 54 UNIT/L (ref 40–150)
ALT SERPL W/O P-5'-P-CCNC: 14 UNIT/L (ref 10–44)
ANION GAP (OHS): 7 MMOL/L (ref 8–16)
AST SERPL-CCNC: 23 UNIT/L (ref 11–45)
BASOPHILS # BLD AUTO: 0.05 K/UL
BASOPHILS NFR BLD AUTO: 0.9 %
BILIRUB SERPL-MCNC: 0.5 MG/DL (ref 0.1–1)
BILIRUB UR QL STRIP.AUTO: NEGATIVE
BUN SERPL-MCNC: 26 MG/DL (ref 8–23)
CALCIUM SERPL-MCNC: 9.2 MG/DL (ref 8.7–10.5)
CHLORIDE SERPL-SCNC: 105 MMOL/L (ref 95–110)
CLARITY UR: CLEAR
CO2 SERPL-SCNC: 27 MMOL/L (ref 23–29)
COLOR UR AUTO: YELLOW
CREAT SERPL-MCNC: 1.5 MG/DL (ref 0.5–1.4)
ERYTHROCYTE [DISTWIDTH] IN BLOOD BY AUTOMATED COUNT: 13.1 % (ref 11.5–14.5)
FERRITIN SERPL-MCNC: 173 NG/ML (ref 20–300)
FOLATE SERPL-MCNC: 13.9 NG/ML (ref 4–24)
GFR SERPLBLD CREATININE-BSD FMLA CKD-EPI: 46 ML/MIN/1.73/M2
GLUCOSE SERPL-MCNC: 96 MG/DL (ref 70–110)
GLUCOSE UR QL STRIP: NEGATIVE
HCT VFR BLD AUTO: 33.7 % (ref 40–54)
HGB BLD-MCNC: 11 GM/DL (ref 14–18)
HGB UR QL STRIP: NEGATIVE
HOLD SPECIMEN: NORMAL
IMM GRANULOCYTES # BLD AUTO: 0.02 K/UL (ref 0–0.04)
IMM GRANULOCYTES NFR BLD AUTO: 0.4 % (ref 0–0.5)
IRON SATN MFR SERPL: 24 % (ref 20–50)
IRON SERPL-MCNC: 70 UG/DL (ref 45–160)
KETONES UR QL STRIP: NEGATIVE
LEUKOCYTE ESTERASE UR QL STRIP: NEGATIVE
LYMPHOCYTES # BLD AUTO: 1.32 K/UL (ref 1–4.8)
MCH RBC QN AUTO: 31 PG (ref 27–31)
MCHC RBC AUTO-ENTMCNC: 32.6 G/DL (ref 32–36)
MCV RBC AUTO: 95 FL (ref 82–98)
NITRITE UR QL STRIP: NEGATIVE
NUCLEATED RBC (/100WBC) (OHS): 0 /100 WBC
PH UR STRIP: 6 [PH]
PLATELET # BLD AUTO: 231 K/UL (ref 150–450)
PMV BLD AUTO: 10.5 FL (ref 9.2–12.9)
POTASSIUM SERPL-SCNC: 4.8 MMOL/L (ref 3.5–5.1)
PROT SERPL-MCNC: 6.9 GM/DL (ref 6–8.4)
PROT UR QL STRIP: NEGATIVE
RBC # BLD AUTO: 3.55 M/UL (ref 4.6–6.2)
RELATIVE EOSINOPHIL (OHS): 1.8 %
RELATIVE LYMPHOCYTE (OHS): 23.4 % (ref 18–48)
RELATIVE MONOCYTE (OHS): 9.4 % (ref 4–15)
RELATIVE NEUTROPHIL (OHS): 64.1 % (ref 38–73)
RETICS/RBC NFR AUTO: 1.1 % (ref 0.4–2)
SODIUM SERPL-SCNC: 139 MMOL/L (ref 136–145)
SP GR UR STRIP: 1.01
TIBC SERPL-MCNC: 293 UG/DL (ref 250–450)
TRANSFERRIN SERPL-MCNC: 198 MG/DL (ref 200–375)
TSH SERPL-ACNC: 0.99 UIU/ML (ref 0.4–4)
VIT B12 SERPL-MCNC: 836 PG/ML (ref 210–950)
WBC # BLD AUTO: 5.65 K/UL (ref 3.9–12.7)

## 2025-05-09 PROCEDURE — 84443 ASSAY THYROID STIM HORMONE: CPT

## 2025-05-09 PROCEDURE — 82040 ASSAY OF SERUM ALBUMIN: CPT

## 2025-05-09 PROCEDURE — 99999 PR PBB SHADOW E&M-EST. PATIENT-LVL IV: CPT | Mod: PBBFAC,,, | Performed by: FAMILY MEDICINE

## 2025-05-09 PROCEDURE — 83521 IG LIGHT CHAINS FREE EACH: CPT

## 2025-05-09 PROCEDURE — 85045 AUTOMATED RETICULOCYTE COUNT: CPT

## 2025-05-09 PROCEDURE — 81002 URINALYSIS NONAUTO W/O SCOPE: CPT | Performed by: FAMILY MEDICINE

## 2025-05-09 PROCEDURE — 84166 PROTEIN E-PHORESIS/URINE/CSF: CPT

## 2025-05-09 PROCEDURE — 82728 ASSAY OF FERRITIN: CPT

## 2025-05-09 PROCEDURE — 36415 COLL VENOUS BLD VENIPUNCTURE: CPT | Mod: PO

## 2025-05-09 PROCEDURE — 82607 VITAMIN B-12: CPT

## 2025-05-09 PROCEDURE — 84466 ASSAY OF TRANSFERRIN: CPT

## 2025-05-09 PROCEDURE — 86334 IMMUNOFIX E-PHORESIS SERUM: CPT

## 2025-05-09 PROCEDURE — 85025 COMPLETE CBC W/AUTO DIFF WBC: CPT

## 2025-05-09 PROCEDURE — 82746 ASSAY OF FOLIC ACID SERUM: CPT

## 2025-05-09 PROCEDURE — 84165 PROTEIN E-PHORESIS SERUM: CPT

## 2025-05-09 NOTE — PROGRESS NOTES
History of Present Illness    Mr. Patel's wife reports he has lost 10 lbs over the past 4 months without intentional weight loss efforts. She also mentions he has fatigue, noting he falls asleep rapidly when seated. Mr. Patel has a family history of Non-Hodgkin's lymphoma, with both his mother and brother having had the condition.    Mr. Patel recently underwent a colonoscopy where 4 precancerous polyps were removed, but no cancer was detected. He is scheduled for a stress test and echocardiogram next week, as recommended by his cardiologist due to some dyspnea on exertion. Mr. Patel reports unilateral back pain, which bothers him when walking but not when sitting. The pain does not radiate down his legs.  This is chronic..  Prior compression fracture noted.  He is being treated for osteoporosis    Mr. Patel's wife mentions his memory is impaired, and he forgets to close doors. She also notes he has a shuffling gait. Mr. Patel acknowledges his memory impairment and that he moves slowly most of the time.    Mr. Patel has an appointment scheduled with a neurologist on July 9th due to concerns about memory and possible Parkinsonism. He previously had neuropsychological testing that raised concerns about potential Parkinsonism.     He also has a history of lacunar stroke, as indicated by a CT from 10 years ago.    Mr. Patel denies trouble eating or swallowing, abdominal pains, fevers, cough, cold, changes in urine, or difficulty walking. He denies having any metal in his body besides the pacemaker, including surgical clips, aneurysm clips, bullets, or foreign bodies.      ROS:  General: -fever, +fatigue, +weight loss  Respiratory: -cough  Gastrointestinal: -abdominal pain  Musculoskeletal: +pain with movement, +back pain  Neurological: +memory loss, +abnormal gait, +shuffling           Alexander was seen today for annual exam.    Diagnoses and all orders for this visit:    Weight loss  -     Comprehensive Metabolic Panel;  Future  -     TSH; Future  -     Urinalysis, Reflex to Urine Culture Urine, Clean Catch; Future  -     Vitamin B12; Future  -     Folate; Future  -     Urinalysis, Reflex to Urine Culture Urine, Clean Catch  -     GREY TOP URINE HOLD    Mild neurocognitive disorder  -     Ambulatory referral/consult to Neurology; Future  -     Vitamin B12; Future  -     Folate; Future    History of stroke  -     Vitamin B12; Future  -     Folate; Future    Osteoporosis, unspecified osteoporosis type, unspecified pathological fracture presence  -     Vitamin B12; Future  -     Folate; Future    Gait disturbance  -     Ambulatory referral/consult to Neurology; Future  -     Vitamin B12; Future  -     Folate; Future    Chronic right-sided low back pain without sciatica  -     Vitamin B12; Future  -     Folate; Future  -     MRI Lumbar Spine Without Contrast; Future    Cardiac pacemaker in situ  -     Vitamin B12; Future  -     Folate; Future    Other amnesia  -     Vitamin B12; Future  -     Folate; Future  -     MRI Brain W WO Contrast; Future    Closed compression fracture of L1 vertebra, sequela  -     Vitamin B12; Future  -     Folate; Future    Gout, unspecified cause, unspecified chronicity, unspecified site  -     Vitamin B12; Future  -     Folate; Future    Tubular adenoma of colon  -     Vitamin B12; Future  -     Folate; Future    Chronic kidney disease, stage 3a  -     Urinalysis, Reflex to Urine Culture Urine, Clean Catch; Future  -     Vitamin B12; Future  -     Folate; Future  -     Urinalysis, Reflex to Urine Culture Urine, Clean Catch  -     GREY TOP URINE HOLD    Chronic anemia  -     CBC Auto Differential; Future  -     Immunoglobulin Free LT Chains Blood; Future  -     Immunofixation, Serum; Future  -     Protein Electrophoresis, Serum; Future  -     Protein Electrophoresis, Random Urine; Future  -     Iron and TIBC; Future  -     Vitamin B12; Future  -     Folate; Future  -     Ferritin; Future  -     Reticulocytes;  Future      Laboratory evaluation as above.  Keep appointment with Neurology.  Follow-up appointment 6 weeks.    Assessment & Plan    IMPRESSION:  - Considered Parkinsonism as potential cause for symptoms, including weight loss, fatigue, slow movement, and memory issues.  - Evaluated weight loss of 10 lbs over 4 months; considering multiple potential causes.  - Assessed back pain, potentially related to degenerative disease/lumbar stenosis.  - Reviewed recent colonoscopy results showing precancerous polyps.  - Evaluated need for further neurological assessment given concerns about memory and movement.  - Assessed pacemaker compatibility with MRI for potential lumbar imaging-card states MRI compatible.          I spent a total of 42 minutes on the day of the visit.  This includes face to face time and non-face to face time preparing to see the patient (eg, review of tests), obtaining and/or reviewing separately obtained history, documenting clinical information in the electronic or other health record, independently interpreting results and communicating results to the patient/family/caregiver, or care coordinator.    Past Medical History:  Past Medical History:   Diagnosis Date    Acute idiopathic gout of multiple sites 02/23/2022    Atherosclerosis of aorta 04/13/2021    He has atherosclerosis noted on imaging in the past and no tx is needed at this time.    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 This is followed by cardiology now.    Cardiac pacemaker in situ 04/13/2021    CKD (chronic kidney disease) stage 3, GFR 30-59 ml/min 12/25/2017    Alexander Patel has an Estimated Glumerular Filtration Rate (EGFR) between 30 and 59 consistent with the definition of chronic kidney disease stage 3.  eGFR if non   Date Value Ref Range Status  04/23/2018 53.8 (A) >60 mL/min/1.73 m^2 Final    Comment:    Calculation used to obtain the  "estimated glomerular filtration rate (eGFR) is the CKD-EPI equation.       Lab Results  Component     Colon polyp 10/08/2015    Diverticulosis of large intestine without hemorrhage 10/08/2015    Essential hypertension 08/28/2015    Gout 2012    Hyperlipidemia LDL goal < 130     Mild obstructive sleep apnea 02/16/2023    Orthostatic hypotension 07/14/2020    Osteoporosis 02/27/2025    PAF (paroxysmal atrial fibrillation) 12/30/2020    He had an episode of afib in the past and he has not required anticoagulation and is not needing rate control.  He has not had stroke symptoms.    Preop cardiovascular exam 04/08/2025    Second degree heart block 08/23/2019    Tubular adenoma of colon 02/25/2021    Tubular adenoma of colon 04/29/2025     Past Surgical History:   Procedure Laterality Date    A-V CARDIAC PACEMAKER INSERTION N/A 8/23/2019    Procedure: INSERTION, CARDIAC PACEMAKER, DUAL CHAMBER;  Surgeon: Manas Meade MD;  Location: [facility] CATH;  Service: Cardiology;  Laterality: N/A;    COLONOSCOPY N/A 10/8/2015    Procedure: COLONOSCOPY;  Surgeon: Avinash Lamb MD;  Location: [facility] ENDO;  Service: Endoscopy;  Laterality: N/A;    COLONOSCOPY N/A 2/25/2021    Procedure: COLONOSCOPY;  Surgeon: Avinash Lamb MD;  Location: [facility] ENDO;  Service: Endoscopy;  Laterality: N/A;    FRACTURE SURGERY      hand     Review of patient's allergies indicates:   Allergen Reactions    No known drug allergies      Medications Ordered Prior to Encounter[1]  Social History[2]  Family History   Problem Relation Name Age of Onset    Heart disease Mother      Lymphoma Mother      Colon polyps Mother      Alcohol abuse Father      Emphysema Father      Lymphoma Brother             Vitals:    05/09/25 0820 05/09/25 0857   BP: (!) 144/86 130/70   Pulse: 75    Temp: 97.5 °F (36.4 °C)    TempSrc: Temporal    Weight: 65.1 kg (143 lb 8 oz)    Height: 5' 5" (1.651 m)      Wt Readings from Last 3 Encounters:   05/09/25 65.1 kg (143 lb 8 oz) "   05/07/25 65.6 kg (144 lb 10 oz)   04/29/25 68 kg (150 lb)       OBJECTIVE:   APPEARANCE: Well nourished, well developed, in no acute distress.    HEAD: Normocephalic.  Atraumatic.  No sinus tenderness.  EYES:   Right eye: Pupil reactive.  Conjunctiva clear.    Left eye: Pupil reactive.  Conjunctiva clear.  EOMI.    MOUTH & THROAT:  No pharyngeal erythema or exudate. No lesions.  NECK: Supple. No bruits.  No JVD.  No cervical lymphadenopathy.  No thyromegaly.    CHEST: Breath sounds clear bilaterally.  Normal respiratory effort  CARDIOVASCULAR: Normal rate.  Regular rhythm.  No murmurs.  No rub.  No gallops.  ABDOMEN: Bowel sounds normal.  Soft.  No tenderness.  No organomegaly.  PERIPHERAL VASCULAR: No cyanosis.  No clubbing.  No edema.  MENTAL STATUS: Alert.  Oriented x 3.  NEUROLOGIC: Cranial nerves two through 12 are intact. Motor strength is normal in the upper and lower extremities proximally and distally.  He does have some bradykinesia .  Deep tendon reflexes are diminished.  Gait is stooped, slow, decreased arm swing.  Mild masked facies  Back decreased range motion.  Stooped posture.  Mild tenderness palpation right lower back.             [1]   Current Outpatient Medications on File Prior to Visit   Medication Sig Dispense Refill    acetaminophen (TYLENOL) 650 MG TbSR Take 1-2 tablets (650-1,300 mg total) by mouth every 8 (eight) hours.      alendronate (FOSAMAX) 70 MG tablet Take 1 tablet (70 mg total) by mouth every 7 days. 12 tablet 3    allopurinoL (ZYLOPRIM) 100 MG tablet TAKE 1&1/2 TABLETS BY MOUTH ONCE DAILY. 135 tablet 2    apixaban (ELIQUIS) 5 mg Tab Take 1 tablet (5 mg total) by mouth 2 (two) times daily. 60 tablet 11    cyanocobalamin (VITAMIN B-12) 1000 MCG tablet Take 1 tablet (1,000 mcg total) by mouth once daily.      GLUCOSAMINE HCL/CHONDR LAM A NA (OSTEO BI-FLEX ORAL) Take 2 tablets by mouth once daily.       multivit-min/FA/lycopen/lutein (CENTRUM SILVER MEN ORAL) Take 1 tablet by mouth  once daily.      rosuvastatin (CRESTOR) 10 MG tablet TAKE 1 TABLET BY MOUTH EVERY DAY 90 tablet 2    silodosin (RAPAFLO) 4 mg Cap capsule Take 1 capsule (4 mg total) by mouth once daily. 90 capsule 3     No current facility-administered medications on file prior to visit.   [2]   Social History  Socioeconomic History    Marital status:      Spouse name: Modesta    Number of children: 2   Occupational History    Occupation: retired   Tobacco Use    Smoking status: Never    Smokeless tobacco: Former     Types: Chew   Substance and Sexual Activity    Alcohol use: Yes     Comment: occasional    Drug use: No    Sexual activity: Yes

## 2025-05-09 NOTE — ANESTHESIA POSTPROCEDURE EVALUATION
Anesthesia Post Evaluation    Patient: Alexander Patel    Procedure(s) Performed: * No procedures listed *    Final Anesthesia Type: MAC      Patient location during evaluation: GI PACU  Patient participation: Yes- Able to Participate  Level of consciousness: awake and alert and oriented  Post-procedure vital signs: reviewed and stable  Pain management: adequate  Airway patency: patent  THERESE mitigation strategies: Multimodal analgesia  PONV status at discharge: No PONV  Anesthetic complications: no      Cardiovascular status: hemodynamically stable  Respiratory status: unassisted, spontaneous ventilation and room air  Hydration status: euvolemic  Follow-up not needed.              Vitals Value Taken Time   /86 05/09/25 08:20   Temp 36.4 °C (97.5 °F) 05/09/25 08:20   Pulse 75 05/09/25 08:20   Resp 18 04/29/25 11:06   SpO2 99 % 04/29/25 11:06         No case tracking events are documented in the log.      Pain/Cristopher Score: No data recorded

## 2025-05-12 LAB
ALBUMIN, SPE (OHS): 4.19 G/DL (ref 3.35–5.55)
ALPHA 1 GLOB (OHS): 0.27 GM/DL (ref 0.17–0.41)
ALPHA 2 GLOB (OHS): 0.57 GM/DL (ref 0.43–0.99)
BETA GLOB (OHS): 0.54 GM/DL (ref 0.5–1.1)
GAMMA GLOBULIN (OHS): 0.94 GM/DL (ref 0.67–1.58)
KAPPA LC FREE SER-MCNC: 1.26 MG/L (ref 0.26–1.65)
KAPPA LC FREE/LAMBDA FREE SER: 2.59 MG/DL (ref 0.33–1.94)
LAMBDA LC FREE SERPL-MCNC: 2.06 MG/DL (ref 0.57–2.63)
PATHOLOGIST INTERPRETATION - IFE SERUM (OHS): NORMAL
PATHOLOGIST INTERPRETATION - UPE (OHS): NORMAL
PATHOLOGIST REVIEW - SPE (OHS): NORMAL
PROT SERPL-MCNC: 6.5 GM/DL (ref 6–8.4)
PROT UR-MCNC: <7 MG/DL

## 2025-05-14 ENCOUNTER — PATIENT MESSAGE (OUTPATIENT)
Dept: CARDIOLOGY | Facility: HOSPITAL | Age: 80
End: 2025-05-14
Payer: MEDICARE

## 2025-05-16 ENCOUNTER — HOSPITAL ENCOUNTER (OUTPATIENT)
Dept: RADIOLOGY | Facility: HOSPITAL | Age: 80
Discharge: HOME | End: 2025-05-16
Payer: MEDICARE

## 2025-05-16 ENCOUNTER — CLINICAL SUPPORT (OUTPATIENT)
Dept: CARDIOLOGY | Facility: HOSPITAL | Age: 80
End: 2025-05-16

## 2025-05-16 ENCOUNTER — HOSPITAL ENCOUNTER (OUTPATIENT)
Dept: CARDIOLOGY | Facility: HOSPITAL | Age: 80
Discharge: HOME | End: 2025-05-16
Payer: MEDICARE

## 2025-05-16 ENCOUNTER — HOSPITAL ENCOUNTER (OUTPATIENT)
Dept: CARDIOLOGY | Facility: HOSPITAL | Age: 80
Discharge: HOME | End: 2025-05-16
Attending: INTERNAL MEDICINE
Payer: MEDICARE

## 2025-05-16 VITALS — BODY MASS INDEX: 23.99 KG/M2 | HEIGHT: 65 IN | WEIGHT: 144 LBS

## 2025-05-16 DIAGNOSIS — I44.1 ATRIOVENTRICULAR BLOCK, SECOND DEGREE: ICD-10-CM

## 2025-05-16 DIAGNOSIS — R06.09 DOE (DYSPNEA ON EXERTION): ICD-10-CM

## 2025-05-16 DIAGNOSIS — I48.0 PAF (PAROXYSMAL ATRIAL FIBRILLATION): ICD-10-CM

## 2025-05-16 LAB
AORTIC SIZE INDEX (SOV): 2.2 CM/M2
AORTIC SIZE INDEX: 2 CM/M2
ASCENDING AORTA: 3.5 CM
AV INDEX (PROSTH): 1.22
AV MEAN GRADIENT: 2 MMHG
AV PEAK GRADIENT: 3 MMHG
AV VALVE AREA BY VELOCITY RATIO: 4.2 CM²
AV VALVE AREA: 5.1 CM²
AV VELOCITY RATIO: 1
BSA FOR ECHO PROCEDURE: 1.73 M2
CV ECHO LV RWT: 0.56 CM
DOP CALC AO PEAK VEL: 0.9 M/S
DOP CALC AO VTI: 18.3 CM
DOP CALC LVOT AREA: 4.2 CM2
DOP CALC LVOT DIAMETER: 2.3 CM
DOP CALC LVOT PEAK VEL: 0.9 M/S
DOP CALC LVOT STROKE VOLUME: 92.6 CM3
DOP CALCLVOT PEAK VEL VTI: 22.3 CM
E WAVE DECELERATION TIME: 359 MSEC
E/A RATIO: 0.53
E/E' RATIO: 8 M/S
ECHO LV POSTERIOR WALL: 1.2 CM (ref 0.6–1.1)
FRACTIONAL SHORTENING: 27.9 % (ref 28–44)
INTERVENTRICULAR SEPTUM: 1.7 CM (ref 0.6–1.1)
IVRT: 131 MSEC
LEFT ATRIUM AREA SYSTOLIC (APICAL 2 CHAMBER): 17.8 CM2
LEFT ATRIUM AREA SYSTOLIC (APICAL 4 CHAMBER): 18.29 CM2
LEFT ATRIUM SIZE: 4.3 CM
LEFT ATRIUM VOLUME INDEX MOD: 31 ML/M2
LEFT ATRIUM VOLUME MOD: 54 ML
LEFT INTERNAL DIMENSION IN SYSTOLE: 3.1 CM (ref 2.1–4)
LEFT VENTRICLE DIASTOLIC VOLUME INDEX: 48.84 ML/M2
LEFT VENTRICLE DIASTOLIC VOLUME: 84 ML
LEFT VENTRICLE END SYSTOLIC VOLUME APICAL 2 CHAMBER: 55.89 ML
LEFT VENTRICLE END SYSTOLIC VOLUME APICAL 4 CHAMBER: 52.03 ML
LEFT VENTRICLE MASS INDEX: 142.4 G/M2
LEFT VENTRICLE SYSTOLIC VOLUME INDEX: 22.7 ML/M2
LEFT VENTRICLE SYSTOLIC VOLUME: 39 ML
LEFT VENTRICULAR INTERNAL DIMENSION IN DIASTOLE: 4.3 CM (ref 3.5–6)
LEFT VENTRICULAR MASS: 245 G
LV LATERAL E/E' RATIO: 7 M/S
LV SEPTAL E/E' RATIO: 9.8 M/S
LVED V (TEICH): 83.95 ML
LVES V (TEICH): 38.61 ML
LVOT MG: 2.02 MMHG
LVOT MV: 0.69 CM/S
MV PEAK A VEL: 0.93 M/S
MV PEAK E VEL: 0.49 M/S
MV STENOSIS PRESSURE HALF TIME: 104.18 MS
MV VALVE AREA P 1/2 METHOD: 2.11 CM2
OHS CV RV/LV RATIO: 0.93 CM
PISA TR MAX VEL: 2.3 M/S
PULM VEIN S/D RATIO: 2.04
PV PEAK D VEL: 0.27 M/S
PV PEAK S VEL: 0.55 M/S
RIGHT VENTRICLE DIASTOLIC BASEL DIMENSION: 4 CM
RIGHT VENTRICLE DIASTOLIC LENGTH: 5.7 CM
RIGHT VENTRICLE DIASTOLIC MID DIMENSION: 2.7 CM
RIGHT VENTRICULAR END-DIASTOLIC DIMENSION: 3.96 CM
RIGHT VENTRICULAR LENGTH IN DIASTOLE (APICAL 4-CHAMBER VIEW): 5.71 CM
RV MID DIAMA: 2.74 CM
RV TISSUE DOPPLER FREE WALL SYSTOLIC VELOCITY 1 (APICAL 4 CHAMBER VIEW): 16.99 CM/S
SINUS: 3.71 CM
STJ: 3 CM
TDI LATERAL: 0.07 M/S
TDI SEPTAL: 0.05 M/S
TDI: 0.06 M/S
TR MAX PG: 21 MMHG
TRICUSPID ANNULAR PLANE SYSTOLIC EXCURSION: 2.4 CM
Z-SCORE OF LEFT VENTRICULAR DIMENSION IN END DIASTOLE: -1.04
Z-SCORE OF LEFT VENTRICULAR DIMENSION IN END SYSTOLE: 0.38

## 2025-05-16 PROCEDURE — 63600175 PHARM REV CODE 636 W HCPCS: Mod: PO

## 2025-05-16 PROCEDURE — 93296 REM INTERROG EVL PM/IDS: CPT | Mod: PO | Performed by: INTERNAL MEDICINE

## 2025-05-16 PROCEDURE — 93016 CV STRESS TEST SUPVJ ONLY: CPT | Mod: ,,, | Performed by: INTERNAL MEDICINE

## 2025-05-16 PROCEDURE — 93017 CV STRESS TEST TRACING ONLY: CPT | Mod: PO

## 2025-05-16 PROCEDURE — 93306 TTE W/DOPPLER COMPLETE: CPT | Mod: 26,,, | Performed by: INTERNAL MEDICINE

## 2025-05-16 PROCEDURE — 78452 HT MUSCLE IMAGE SPECT MULT: CPT | Mod: 26,,, | Performed by: INTERNAL MEDICINE

## 2025-05-16 PROCEDURE — 93306 TTE W/DOPPLER COMPLETE: CPT | Mod: PO

## 2025-05-16 PROCEDURE — 93294 REM INTERROG EVL PM/LDLS PM: CPT | Mod: ,,, | Performed by: INTERNAL MEDICINE

## 2025-05-16 PROCEDURE — 78452 HT MUSCLE IMAGE SPECT MULT: CPT | Mod: PO

## 2025-05-16 PROCEDURE — 93018 CV STRESS TEST I&R ONLY: CPT | Mod: ,,, | Performed by: INTERNAL MEDICINE

## 2025-05-16 RX ORDER — REGADENOSON 0.08 MG/ML
0.4 INJECTION, SOLUTION INTRAVENOUS
Status: COMPLETED | OUTPATIENT
Start: 2025-05-16 | End: 2025-05-16

## 2025-05-16 RX ADMIN — REGADENOSON 0.4 MG: 0.08 INJECTION, SOLUTION INTRAVENOUS at 01:05

## 2025-05-19 LAB
AORTIC SIZE INDEX (SOV): 2.2 CM/M2
AORTIC SIZE INDEX: 2 CM/M2
ASCENDING AORTA: 3.5 CM
AV INDEX (PROSTH): 1.22
AV MEAN GRADIENT: 2 MMHG
AV PEAK GRADIENT: 3 MMHG
AV VALVE AREA BY VELOCITY RATIO: 4.2 CM²
AV VALVE AREA: 5.1 CM²
AV VELOCITY RATIO: 1
BSA FOR ECHO PROCEDURE: 1.73 M2
CV ECHO LV RWT: 0.56 CM
CV PHARM DOSE: 0.4 MG
CV STRESS BASE HR: 60 BPM
DIASTOLIC BLOOD PRESSURE: 84 MMHG
DOP CALC AO PEAK VEL: 0.9 M/S
DOP CALC AO VTI: 18.3 CM
DOP CALC LVOT AREA: 4.2 CM2
DOP CALC LVOT DIAMETER: 2.3 CM
DOP CALC LVOT PEAK VEL: 0.9 M/S
DOP CALC LVOT STROKE VOLUME: 92.6 CM3
DOP CALCLVOT PEAK VEL VTI: 22.3 CM
E WAVE DECELERATION TIME: 359 MSEC
E/A RATIO: 0.53
E/E' RATIO: 8 M/S
ECHO LV POSTERIOR WALL: 1.2 CM (ref 0.6–1.1)
FRACTIONAL SHORTENING: 27.9 % (ref 28–44)
INTERVENTRICULAR SEPTUM: 1.7 CM (ref 0.6–1.1)
IVRT: 131 MSEC
LEFT ATRIUM AREA SYSTOLIC (APICAL 2 CHAMBER): 17.8 CM2
LEFT ATRIUM AREA SYSTOLIC (APICAL 4 CHAMBER): 18.29 CM2
LEFT ATRIUM SIZE: 4.3 CM
LEFT ATRIUM VOLUME INDEX MOD: 31 ML/M2
LEFT ATRIUM VOLUME MOD: 54 ML
LEFT INTERNAL DIMENSION IN SYSTOLE: 3.1 CM (ref 2.1–4)
LEFT VENTRICLE DIASTOLIC VOLUME INDEX: 48.84 ML/M2
LEFT VENTRICLE DIASTOLIC VOLUME: 84 ML
LEFT VENTRICLE END SYSTOLIC VOLUME APICAL 2 CHAMBER: 55.89 ML
LEFT VENTRICLE END SYSTOLIC VOLUME APICAL 4 CHAMBER: 52.03 ML
LEFT VENTRICLE MASS INDEX: 142.4 G/M2
LEFT VENTRICLE SYSTOLIC VOLUME INDEX: 22.7 ML/M2
LEFT VENTRICLE SYSTOLIC VOLUME: 39 ML
LEFT VENTRICULAR INTERNAL DIMENSION IN DIASTOLE: 4.3 CM (ref 3.5–6)
LEFT VENTRICULAR MASS: 245 G
LV LATERAL E/E' RATIO: 7 M/S
LV SEPTAL E/E' RATIO: 9.8 M/S
LVED V (TEICH): 83.95 ML
LVES V (TEICH): 38.61 ML
LVOT MG: 2.02 MMHG
LVOT MV: 0.69 CM/S
MV PEAK A VEL: 0.93 M/S
MV PEAK E VEL: 0.49 M/S
MV STENOSIS PRESSURE HALF TIME: 104.18 MS
MV VALVE AREA P 1/2 METHOD: 2.11 CM2
NUC STRESS EJECTION FRACTION: 68 %
OHS CV CPX 1 MINUTE RECOVERY HEART RATE: 60 BPM
OHS CV CPX 85 PERCENT MAX PREDICTED HEART RATE MALE: 118
OHS CV CPX MAX PREDICTED HEART RATE: 139
OHS CV CPX PATIENT IS FEMALE: 0
OHS CV CPX PATIENT IS MALE: 1
OHS CV CPX PEAK DIASTOLIC BLOOD PRESSURE: 84 MMHG
OHS CV CPX PEAK HEAR RATE: 61 BPM
OHS CV CPX PEAK RATE PRESSURE PRODUCT: 9516
OHS CV CPX PEAK SYSTOLIC BLOOD PRESSURE: 156 MMHG
OHS CV CPX PERCENT MAX PREDICTED HEART RATE ACHIEVED: 44
OHS CV CPX RATE PRESSURE PRODUCT PRESENTING: 9360
OHS CV INITIAL DOSE: 10.5 MCG/KG/MIN
OHS CV PEAK DOSE: 33 MCG/KG/MIN
OHS CV PHARM TIME: 1354 MIN
OHS CV RV/LV RATIO: 0.93 CM
PISA TR MAX VEL: 2.3 M/S
PULM VEIN S/D RATIO: 2.04
PV PEAK D VEL: 0.27 M/S
PV PEAK S VEL: 0.55 M/S
RA PRESSURE ESTIMATED: 3 MMHG
RIGHT VENTRICLE DIASTOLIC BASEL DIMENSION: 4 CM
RIGHT VENTRICLE DIASTOLIC LENGTH: 5.7 CM
RIGHT VENTRICLE DIASTOLIC MID DIMENSION: 2.7 CM
RIGHT VENTRICULAR END-DIASTOLIC DIMENSION: 3.96 CM
RIGHT VENTRICULAR LENGTH IN DIASTOLE (APICAL 4-CHAMBER VIEW): 5.71 CM
RV MID DIAMA: 2.74 CM
RV TB RVSP: 5 MMHG
RV TISSUE DOPPLER FREE WALL SYSTOLIC VELOCITY 1 (APICAL 4 CHAMBER VIEW): 16.99 CM/S
SINUS: 3.71 CM
STJ: 3 CM
SYSTOLIC BLOOD PRESSURE: 156 MMHG
TDI LATERAL: 0.07 M/S
TDI SEPTAL: 0.05 M/S
TDI: 0.06 M/S
TR MAX PG: 21 MMHG
TRICUSPID ANNULAR PLANE SYSTOLIC EXCURSION: 2.4 CM
TV REST PULMONARY ARTERY PRESSURE: 24 MMHG
Z-SCORE OF LEFT VENTRICULAR DIMENSION IN END DIASTOLE: -1.04
Z-SCORE OF LEFT VENTRICULAR DIMENSION IN END SYSTOLE: 0.38

## 2025-05-20 ENCOUNTER — TELEPHONE (OUTPATIENT)
Dept: CARDIOLOGY | Facility: CLINIC | Age: 80
End: 2025-05-20
Payer: MEDICARE

## 2025-05-20 ENCOUNTER — RESULTS FOLLOW-UP (OUTPATIENT)
Dept: CARDIOLOGY | Facility: CLINIC | Age: 80
End: 2025-05-20

## 2025-05-20 NOTE — TELEPHONE ENCOUNTER
----- Message from Danii Sainz PA-C sent at 5/20/2025 11:50 AM CDT -----  Please call patient- Echocardiogram and stress test are stable with normal heart pump function, no significant valve disease, no signs of fluid overload, and no evidence potential heart artery   blockages    ----- Message -----  From: Manas Meade MD  Sent: 5/19/2025  12:36 PM CDT  To: Danii Sainz PA-C

## 2025-05-21 ENCOUNTER — TELEPHONE (OUTPATIENT)
Dept: CARDIOLOGY | Facility: CLINIC | Age: 80
End: 2025-05-21
Payer: MEDICARE

## 2025-05-21 NOTE — TELEPHONE ENCOUNTER
Spoke to wife and explained that computer will flag a test as abnormal that has anything not in a normal range, but that the provider reviews the results and determines if it is abnormal for each patient. Wife expressed understanding

## 2025-05-21 NOTE — TELEPHONE ENCOUNTER
----- Message from Cathryn sent at 5/21/2025  8:31 AM CDT -----  Contact: j carlos (wife)  Type:  Patient Returning CallWhkuldeep Called:Rachel Restrepo Message for Patient:Alisiaoes the patient know what this is regarding?:resultsWould the patient rather a call back or a response via [facility]? Call [facility] Call Back Number:[phone] Information: Has questionsThanks

## 2025-05-26 LAB
OHS CV AF BURDEN PERCENT: < 1
OHS CV DC REMOTE DEVICE TYPE: NORMAL
OHS CV ICD SHOCK: NO
OHS CV RV PACING PERCENT: 30 %

## 2025-05-29 ENCOUNTER — TELEPHONE (OUTPATIENT)
Dept: NEUROLOGY | Facility: CLINIC | Age: 80
End: 2025-05-29
Payer: MEDICARE

## 2025-05-29 NOTE — TELEPHONE ENCOUNTER
Copied from CRM #[id number]. Topic: Appointments - Appointment Confirmation  >> May 29, 2025  3:46 PM Brie wrote:  .Type:  Patient Returning Call    Who Called: Patients wife   Who Left Message for Patient: Renita   Does the patient know what this is regarding?: Rescheduling upcoming appt   Would the patient rather a call back or a response via [facility]?  Call   Best Call Back Number: [phone] (M)    Additional Information:

## 2025-05-29 NOTE — TELEPHONE ENCOUNTER
Called pt to discuss rescheduling visit with MsRamona Tico due to it being scheduled incorrectly. The pt did not answer. Left vm.

## 2025-06-02 ENCOUNTER — PATIENT MESSAGE (OUTPATIENT)
Dept: FAMILY MEDICINE | Facility: CLINIC | Age: 80
End: 2025-06-02
Payer: MEDICARE

## 2025-06-02 ENCOUNTER — TELEPHONE (OUTPATIENT)
Dept: HEMATOLOGY/ONCOLOGY | Facility: CLINIC | Age: 80
End: 2025-06-02
Payer: MEDICARE

## 2025-06-02 ENCOUNTER — TELEPHONE (OUTPATIENT)
Dept: FAMILY MEDICINE | Facility: CLINIC | Age: 80
End: 2025-06-02
Payer: MEDICARE

## 2025-06-02 DIAGNOSIS — G89.29 CHRONIC RIGHT-SIDED LOW BACK PAIN WITHOUT SCIATICA: Primary | ICD-10-CM

## 2025-06-02 DIAGNOSIS — M54.50 CHRONIC RIGHT-SIDED LOW BACK PAIN WITHOUT SCIATICA: Primary | ICD-10-CM

## 2025-06-03 ENCOUNTER — TELEPHONE (OUTPATIENT)
Dept: RADIOLOGY | Facility: HOSPITAL | Age: 80
End: 2025-06-03
Payer: MEDICARE

## 2025-06-06 ENCOUNTER — TELEPHONE (OUTPATIENT)
Dept: NEUROLOGY | Facility: CLINIC | Age: 80
End: 2025-06-06
Payer: MEDICARE

## 2025-06-09 ENCOUNTER — HOSPITAL ENCOUNTER (OUTPATIENT)
Dept: RADIOLOGY | Facility: HOSPITAL | Age: 80
Discharge: HOME | End: 2025-06-09
Attending: FAMILY MEDICINE
Payer: MEDICARE

## 2025-06-09 DIAGNOSIS — G89.29 CHRONIC RIGHT-SIDED LOW BACK PAIN WITHOUT SCIATICA: ICD-10-CM

## 2025-06-09 DIAGNOSIS — M54.50 CHRONIC RIGHT-SIDED LOW BACK PAIN WITHOUT SCIATICA: ICD-10-CM

## 2025-06-09 PROCEDURE — 71046 X-RAY EXAM CHEST 2 VIEWS: CPT | Mod: TC,PO

## 2025-06-09 PROCEDURE — 71046 X-RAY EXAM CHEST 2 VIEWS: CPT | Mod: 26,,, | Performed by: RADIOLOGY

## 2025-06-10 ENCOUNTER — TELEPHONE (OUTPATIENT)
Dept: FAMILY MEDICINE | Facility: CLINIC | Age: 80
End: 2025-06-10
Payer: MEDICARE

## 2025-06-10 ENCOUNTER — RESULTS FOLLOW-UP (OUTPATIENT)
Dept: FAMILY MEDICINE | Facility: CLINIC | Age: 80
End: 2025-06-10

## 2025-06-10 NOTE — TELEPHONE ENCOUNTER
Hematology appt was changed by department, to August and wife is asking if he should be seen sooner, has appt with Dr Mcmahon on the 20th, they will talk to him then.  Thank you

## 2025-06-10 NOTE — TELEPHONE ENCOUNTER
No answer when calledCopied from CRM #[id number]. Topic: General Inquiry - Patient Advice  >> Laron 10, 2025  9:12 AM Jessica wrote:  Type:  Patient Returning Call    Who Called:Modesta/ Wife  Who Left Message for Patient:  Does the patient know what this is regarding?:Appt  Would the patient rather a call back or a response via [facility]? Callback  Best Call Back Number:[phone]  Additional Information: Need a callback

## 2025-06-13 ENCOUNTER — TELEPHONE (OUTPATIENT)
Dept: FAMILY MEDICINE | Facility: CLINIC | Age: 80
End: 2025-06-13

## 2025-06-13 DIAGNOSIS — Z86.73 HISTORY OF STROKE: Primary | ICD-10-CM

## 2025-06-13 NOTE — TELEPHONE ENCOUNTER
----- Message from Abdirahman sent at 6/13/2025  8:23 AM CDT -----  Good morning,     Mr. Patel has an MRI scheduled on Tuesday 06/17/25 and will need to have a stat creatinine before her appointment. Would you mind placing an order to be scheduled?    Thank you,  Abdirahman  Radiology  Department.

## 2025-06-16 ENCOUNTER — LAB VISIT (OUTPATIENT)
Dept: LAB | Facility: HOSPITAL | Age: 80
End: 2025-06-16
Attending: NURSE PRACTITIONER
Payer: MEDICARE

## 2025-06-16 DIAGNOSIS — Z86.73 HISTORY OF STROKE: ICD-10-CM

## 2025-06-16 LAB
CREAT SERPL-MCNC: 1.3 MG/DL (ref 0.5–1.4)
GFR SERPLBLD CREATININE-BSD FMLA CKD-EPI: 55 ML/MIN/1.73/M2

## 2025-06-16 PROCEDURE — 36415 COLL VENOUS BLD VENIPUNCTURE: CPT | Mod: PO

## 2025-06-16 PROCEDURE — 82565 ASSAY OF CREATININE: CPT

## 2025-06-17 ENCOUNTER — RESULTS FOLLOW-UP (OUTPATIENT)
Dept: FAMILY MEDICINE | Facility: CLINIC | Age: 80
End: 2025-06-17

## 2025-06-17 ENCOUNTER — TELEPHONE (OUTPATIENT)
Dept: FAMILY MEDICINE | Facility: CLINIC | Age: 80
End: 2025-06-17
Payer: MEDICARE

## 2025-06-17 ENCOUNTER — HOSPITAL ENCOUNTER (OUTPATIENT)
Dept: RADIOLOGY | Facility: HOSPITAL | Age: 80
Discharge: HOME | End: 2025-06-17
Attending: FAMILY MEDICINE
Payer: MEDICARE

## 2025-06-17 VITALS — OXYGEN SATURATION: 97 % | HEART RATE: 80 BPM

## 2025-06-17 DIAGNOSIS — M54.50 CHRONIC RIGHT-SIDED LOW BACK PAIN WITHOUT SCIATICA: ICD-10-CM

## 2025-06-17 DIAGNOSIS — G89.29 CHRONIC RIGHT-SIDED LOW BACK PAIN WITHOUT SCIATICA: ICD-10-CM

## 2025-06-17 DIAGNOSIS — R41.3 OTHER AMNESIA: ICD-10-CM

## 2025-06-17 PROCEDURE — A9585 GADOBUTROL INJECTION: HCPCS | Performed by: FAMILY MEDICINE

## 2025-06-17 PROCEDURE — 70553 MRI BRAIN STEM W/O & W/DYE: CPT | Mod: 26,,, | Performed by: STUDENT IN AN ORGANIZED HEALTH CARE EDUCATION/TRAINING PROGRAM

## 2025-06-17 PROCEDURE — 70553 MRI BRAIN STEM W/O & W/DYE: CPT | Mod: TC

## 2025-06-17 PROCEDURE — 72148 MRI LUMBAR SPINE W/O DYE: CPT | Mod: TC

## 2025-06-17 PROCEDURE — 72148 MRI LUMBAR SPINE W/O DYE: CPT | Mod: 26,,, | Performed by: RADIOLOGY

## 2025-06-17 PROCEDURE — 25500020 PHARM REV CODE 255: Performed by: FAMILY MEDICINE

## 2025-06-17 RX ORDER — GADOBUTROL 604.72 MG/ML
10 INJECTION INTRAVENOUS
Status: COMPLETED | OUTPATIENT
Start: 2025-06-17 | End: 2025-06-17

## 2025-06-17 RX ADMIN — GADOBUTROL 6 ML: 604.72 INJECTION INTRAVENOUS at 08:06

## 2025-06-17 NOTE — NURSING
MRI complete at this time, patient tolerated MRI well, heart rate 99, O2 sat. 95% on RA, cardiac device placed in normal operating mode per [employer] patient discharged in NAD.

## 2025-06-17 NOTE — NURSING
Patient arrived for scheduled MRI, patient identification verified X 2, allergies reviewed, patient assisted to MRI table without difficulty, cardiac device placed in MRI safe mode per [employer] MRI safe cardiac monitor and pulse ox. applied, heart rate 80, O2 sat. 94% on RA, NAD noted at this time, will continue to monitor patient throughout MRI.

## 2025-06-20 ENCOUNTER — OFFICE VISIT (OUTPATIENT)
Dept: FAMILY MEDICINE | Facility: CLINIC | Age: 80
End: 2025-06-20
Payer: MEDICARE

## 2025-06-20 ENCOUNTER — TELEPHONE (OUTPATIENT)
Dept: FAMILY MEDICINE | Facility: CLINIC | Age: 80
End: 2025-06-20
Payer: MEDICARE

## 2025-06-20 ENCOUNTER — PATIENT MESSAGE (OUTPATIENT)
Dept: FAMILY MEDICINE | Facility: CLINIC | Age: 80
End: 2025-06-20

## 2025-06-20 VITALS
TEMPERATURE: 97 F | HEART RATE: 66 BPM | WEIGHT: 146.88 LBS | SYSTOLIC BLOOD PRESSURE: 146 MMHG | DIASTOLIC BLOOD PRESSURE: 86 MMHG | HEIGHT: 65 IN | BODY MASS INDEX: 24.47 KG/M2

## 2025-06-20 DIAGNOSIS — I10 ESSENTIAL HYPERTENSION: ICD-10-CM

## 2025-06-20 DIAGNOSIS — I08.0 MILD MITRAL AND AORTIC REGURGITATION: ICD-10-CM

## 2025-06-20 DIAGNOSIS — G31.84 MILD NEUROCOGNITIVE DISORDER: ICD-10-CM

## 2025-06-20 DIAGNOSIS — I67.89 CEREBRAL MICROVASCULAR DISEASE: ICD-10-CM

## 2025-06-20 DIAGNOSIS — R63.4 WEIGHT LOSS: ICD-10-CM

## 2025-06-20 DIAGNOSIS — D64.9 CHRONIC ANEMIA: ICD-10-CM

## 2025-06-20 DIAGNOSIS — D47.2 MONOCLONAL PARAPROTEINEMIA: Primary | ICD-10-CM

## 2025-06-20 DIAGNOSIS — M54.50 CHRONIC RIGHT-SIDED LOW BACK PAIN WITHOUT SCIATICA: ICD-10-CM

## 2025-06-20 DIAGNOSIS — M51.360 DEGENERATION OF INTERVERTEBRAL DISC OF LUMBAR REGION WITH DISCOGENIC BACK PAIN: ICD-10-CM

## 2025-06-20 DIAGNOSIS — G89.29 CHRONIC RIGHT-SIDED LOW BACK PAIN WITHOUT SCIATICA: ICD-10-CM

## 2025-06-20 PROCEDURE — 99999 PR PBB SHADOW E&M-EST. PATIENT-LVL IV: CPT | Mod: PBBFAC,,, | Performed by: FAMILY MEDICINE

## 2025-06-20 RX ORDER — AMLODIPINE BESYLATE 2.5 MG/1
2.5 TABLET ORAL DAILY
Qty: 90 TABLET | Refills: 0 | Status: SHIPPED | OUTPATIENT
Start: 2025-06-20 | End: 2026-06-20

## 2025-06-20 NOTE — PROGRESS NOTES
Patient presents for follow-up.  Multiple tests done since last visit as he was having some weight loss.  He has actually gained 3-4 lb since last visit.  He did have low-level monoclonal paraproteinemia chronic anemia noted and he is pending follow-up with Hematology.  Cardiac evaluation showed mild valvular disease with echocardiogram, no ischemia noted on has nuclear stress testing.  Brain MRI showed evidence of cerebral microvascular disease drops or tumors.  Has gait difficulties concerns and we have some concern parkinsonism.  He is pending an appointment with Neurology.    Mr. Patel reports lower back pain, primarily on the right side, localized to the back without radiation down the legs. He underwent physical therapy for this issue approximately 8 months ago, which did not alleviate the back problem. He has had elevated blood pressure readings recently, with clinic measurements of 185/60 and 181/60.  Recent readings in clinic have been elevated as well.  At home, his blood pressure readings are borderline    MRI lumbar spine showed degenerative disc disease, but no significant spinal stenosis.  He had an old compression fracture noted.    ROS:  Musculoskeletal: +back pain           Alexander was seen today for follow-up.    Diagnoses and all orders for this visit:    Monoclonal paraproteinemia    Chronic anemia    Cerebral microvascular disease    Chronic right-sided low back pain without sciatica  -     Ambulatory Referral/Consult to Physical Therapy; Future    Degeneration of intervertebral disc of lumbar region with discogenic back pain  -     Ambulatory Referral/Consult to Physical Therapy; Future    Mild mitral and aortic regurgitation    Weight loss    Mild neurocognitive disorder    Essential hypertension    Other orders  -     amLODIPine (NORVASC) 2.5 MG tablet; Take 1 tablet (2.5 mg total) by mouth once daily.  -     MYC E-VISIT      Add amlodipine for hypertension.  Visit blood pressure 4 weeks.   Keep scheduled appointments with Neurology, Hematology, Cardiology.  Follow-up appointment after specialty evaluations physical therapy.  We did discuss pain management referral, but declines for now.      This note was generated with the assistance of ambient listening technology. Verbal consent was obtained by the patient and accompanying visitor(s) for the recording of patient appointment to facilitate this note. I attest to having reviewed and edited the generated note for accuracy, though some syntax or spelling errors may persist. Please contact the author of this note for any clarification.        Past Medical History:  Past Medical History:   Diagnosis Date    Acute idiopathic gout of multiple sites 02/23/2022    Atherosclerosis of aorta 04/13/2021    He has atherosclerosis noted on imaging in the past and no tx is needed at this time.    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 This is followed by cardiology now.    Cardiac pacemaker in situ 04/13/2021    Chronic anemia 6/20/2025    CKD (chronic kidney disease) stage 3, GFR 30-59 ml/min 12/25/2017    Alexander Patel has an Estimated Glumerular Filtration Rate (EGFR) between 30 and 59 consistent with the definition of chronic kidney disease stage 3.  eGFR if non   Date Value Ref Range Status  04/23/2018 53.8 (A) >60 mL/min/1.73 m^2 Final    Comment:    Calculation used to obtain the estimated glomerular filtration rate (eGFR) is the CKD-EPI equation.       Lab Results  Component     Colon polyp 10/08/2015    Diverticulosis of large intestine without hemorrhage 10/08/2015    Essential hypertension 08/28/2015    Gout 2012    Hyperlipidemia LDL goal < 130     Mild obstructive sleep apnea 02/16/2023    Orthostatic hypotension 07/14/2020    Osteoporosis 02/27/2025    PAF (paroxysmal atrial fibrillation) 12/30/2020    He had an episode of afib in the past and he has not  "required anticoagulation and is not needing rate control.  He has not had stroke symptoms.    Preop cardiovascular exam 04/08/2025    Second degree heart block 08/23/2019    Tubular adenoma of colon 02/25/2021    Tubular adenoma of colon 04/29/2025     Past Surgical History:   Procedure Laterality Date    A-V CARDIAC PACEMAKER INSERTION N/A 8/23/2019    Procedure: INSERTION, CARDIAC PACEMAKER, DUAL CHAMBER;  Surgeon: Manas Meade MD;  Location: [facility] CATH;  Service: Cardiology;  Laterality: N/A;    COLONOSCOPY N/A 10/8/2015    Procedure: COLONOSCOPY;  Surgeon: Avinash Lamb MD;  Location: [facility] ENDO;  Service: Endoscopy;  Laterality: N/A;    COLONOSCOPY N/A 2/25/2021    Procedure: COLONOSCOPY;  Surgeon: Avinash Lamb MD;  Location: [facility] ENDO;  Service: Endoscopy;  Laterality: N/A;    FRACTURE SURGERY      hand     Review of patient's allergies indicates:   Allergen Reactions    No known drug allergies      Medications Ordered Prior to Encounter[1]  Social History[2]  Family History   Problem Relation Name Age of Onset    Heart disease Mother      Lymphoma Mother      Colon polyps Mother      Alcohol abuse Father      Emphysema Father      Lymphoma Brother             Vitals:    06/20/25 0854 06/20/25 0944   BP: (!) 185/97 (!) 146/86   Pulse: 66    Temp: 97.3 °F (36.3 °C)    TempSrc: Temporal    Weight: 66.6 kg (146 lb 14.4 oz)    Height: 5' 5" (1.651 m)        Wt Readings from Last 3 Encounters:   06/20/25 66.6 kg (146 lb 14.4 oz)   05/16/25 65.3 kg (144 lb)   05/16/25 65.3 kg (144 lb)       APPEARANCE: Well nourished, well developed, in no acute distress.    HEAD: Normocephalic.  Atraumatic.  EYES:   Right eye: Pupil reactive.  Conjunctiva clear.    Left eye: Pupil reactive.  Conjunctiva clear.    NECK: Supple.   CHEST: Breath sounds clear bilaterally.  Normal respiratory effort  CARDIOVASCULAR: Normal rate.  Regular rhythm.  No murmurs.  No rub.  No gallops.   No edema.  MENTAL STATUS: Alert.  " Oriented x 3.       [1]   Current Outpatient Medications on File Prior to Visit   Medication Sig Dispense Refill    acetaminophen (TYLENOL) 650 MG TbSR Take 1-2 tablets (650-1,300 mg total) by mouth every 8 (eight) hours.      alendronate (FOSAMAX) 70 MG tablet Take 1 tablet (70 mg total) by mouth every 7 days. 12 tablet 3    allopurinoL (ZYLOPRIM) 100 MG tablet TAKE 1&1/2 TABLETS BY MOUTH ONCE DAILY. 135 tablet 2    apixaban (ELIQUIS) 5 mg Tab Take 1 tablet (5 mg total) by mouth 2 (two) times daily. 60 tablet 11    cyanocobalamin (VITAMIN B-12) 1000 MCG tablet Take 1 tablet (1,000 mcg total) by mouth once daily.      GLUCOSAMINE HCL/CHONDR LAM A NA (OSTEO BI-FLEX ORAL) Take 2 tablets by mouth once daily.       multivit-min/FA/lycopen/lutein (CENTRUM SILVER MEN ORAL) Take 1 tablet by mouth once daily.      rosuvastatin (CRESTOR) 10 MG tablet TAKE 1 TABLET BY MOUTH EVERY DAY 90 tablet 2    silodosin (RAPAFLO) 4 mg Cap capsule Take 1 capsule (4 mg total) by mouth once daily. 90 capsule 3     No current facility-administered medications on file prior to visit.   [2]   Social History  Socioeconomic History    Marital status:      Spouse name: Modesta    Number of children: 2   Occupational History    Occupation: retired   Tobacco Use    Smoking status: Never    Smokeless tobacco: Former     Types: Chew   Substance and Sexual Activity    Alcohol use: Yes     Comment: occasional    Drug use: No    Sexual activity: Yes

## 2025-06-25 ENCOUNTER — TELEPHONE (OUTPATIENT)
Dept: FAMILY MEDICINE | Facility: CLINIC | Age: 80
End: 2025-06-25
Payer: MEDICARE

## 2025-06-25 NOTE — TELEPHONE ENCOUNTER
Copied from CRM #[id number]. Topic: General Inquiry - Patient Advice  >> Jun 25, 2025  1:17 PM Renetta wrote:  .Type:  Needs Medical Advice    Who Called: BENIGNO WITH [facility]  Symptoms (please be specific):    How long has patient had these symptoms:    Pharmacy name and phone #:      Would the patient rather a call back or a response via [facility]? CALL BACK  Best Call Back Number: [phone]  Additional Information: PROVIDER IS NEEDING ORDERS REFAXED OVER FOR THE PATIENT BECAUSE THEY ARE UNCLEAR

## 2025-07-10 ENCOUNTER — OFFICE VISIT (OUTPATIENT)
Dept: NEUROLOGY | Facility: CLINIC | Age: 80
End: 2025-07-10
Payer: MEDICARE

## 2025-07-10 VITALS
SYSTOLIC BLOOD PRESSURE: 142 MMHG | BODY MASS INDEX: 23.99 KG/M2 | DIASTOLIC BLOOD PRESSURE: 82 MMHG | RESPIRATION RATE: 16 BRPM | WEIGHT: 144.19 LBS

## 2025-07-10 DIAGNOSIS — G31.84 MILD NEUROCOGNITIVE DISORDER: ICD-10-CM

## 2025-07-10 DIAGNOSIS — G20.C PARKINSONISM, UNSPECIFIED PARKINSONISM TYPE: Primary | ICD-10-CM

## 2025-07-10 PROCEDURE — 99999 PR PBB SHADOW E&M-EST. PATIENT-LVL IV: CPT | Mod: PBBFAC,,,

## 2025-07-10 PROCEDURE — 99483 ASSMT & CARE PLN PT COG IMP: CPT | Mod: S$GLB,,,

## 2025-07-10 PROCEDURE — 99499 UNLISTED E&M SERVICE: CPT | Mod: S$GLB,,,

## 2025-07-10 NOTE — PROGRESS NOTES
Caregiver name: j carlos  Relationship to the patient: wife  Does the patient have a living will? Yes  Does the patient have a designated healthcare POA? Yes  Does the patient have a designated general POA? Yes    Have educational materials/resources been provided? Yes      Activities of Daily Living    Bathing: Independent  Dressing: Independent  Grooming: Independent  Mouth Care: Independent  Toileting: Independent  Transferring Bed/Chair: Independent  Walking: Independent  Climbing: Needs Help  Eating: Independent      Instrumental Activities of Daily Living    Shopping: Independent  Cooking: Independent  Managing Medications: Independent  Using the phone and looking up numbers: Independent  Doing Housework: Independent  Doing Laundry: Independent  Driving or using public transportation: Needs Help  Managing finances: Dependent    Functional Assessment Staging  4   Decreased ability to perform complex tasks, e.g. planning dinner for guests, handling personal finances (such as forgetting to pay bills), difficulty marketing, etc.*             7/10/2025     1:59 PM 9/14/2023    11:04 AM 6/30/2022     7:46 AM 7/22/2019     2:27 PM 8/21/2015     9:00 AM   Depression Patient Health Questionnaire   Over the last two weeks how often have you been bothered by little interest or pleasure in doing things Not at all Not at all Not at all  Not at all  Not at all    Over the last two weeks how often have you been bothered by feeling down, depressed or hopeless Not at all Not at all Not at all Not at all  Not at all    PHQ-2 Total Score 0 0 0 0 0       Data saved with a previous flowsheet row definition

## 2025-07-10 NOTE — PROGRESS NOTES
NEUROLOGY  Outpatient Consultation Visit   [facility]  [address] [facility], [place] [address]  [phone] (office) / [phone] (fax)    Patient Name:  Alexander Patel  :  1945  MR #:  [id number]  Acct #:  [id number]    Date of Neurology Consult: 2025  Name of Provider: SAAD Guzman    Other Physicians:  Jai Mcmahon MD (Primary Care Physician); No ref. provider found (Referring)      Chief Complaint: Memory Loss      History of Present Illness (HPI):  Alexander Patel is a 80 y.o. male with a PMHX of CVA, HTN, pAF (on eliquis with PPM), HLD, CKD 3a, BPH, and THERESE. Patient presents to clinic for memory follow up. Patient is new to me, he has previously seen Dr. Thomson, last seen in . Patient is here with wife who also contributes to the following history.     Interval History:   He had NP testing in 2023 which was consistent with MCI although suspicion for emerging neurodegenerative condition, possibly PD or DLB. Patient and wife feel that his memory has decline. He is having more trouble concentrating. He reports hypophonia, sialorrhea and orthostasis. He feels that he walks slowly and stooped forward. He denies any tremor but does feel that his handwriting is smaller. Wife notes that he is active in his sleep. No dyskinesia. His MMSE in  was 25/30, today it is 24/30.     Prior HPI per Dr. Thomson:   They have noticed memory loss for about 2 years and progressively worsening. He has noticed more word finding trouble lately. His wife notes that they will be going somewhere familiar and he wont' know where to go. His thought process is off sometimes. He drives locally but doesn't drive further. He feels uncomfortable with it and his wife has been driving more. He does little projects and fix it things around the house fine. She pays the bills but always has.      He notes he tends to stoop over. He sometimes shuffles when he walks. No falls.      He has  hearing loss and wears hearing aids.      He has mild THERESE diagnosis. He does not wear a CPAP. Did not tolerate it. That was years ago. He snores per his wife. He has a pacemaker.      No family history of memory trouble.      B12 level was 285.   CT head in 2021 showed right parietal ischemic stroke.   Carotid US in 2022 showed no stenosis.        Past Medical, Surgical, Family & Social History:   Past Medical History:   Diagnosis Date    Acute idiopathic gout of multiple sites 02/23/2022    Atherosclerosis of aorta 04/13/2021    He has atherosclerosis noted on imaging in the past and no tx is needed at this time.    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 This is followed by cardiology now.    Cardiac pacemaker in situ 04/13/2021    Chronic anemia 6/20/2025    CKD (chronic kidney disease) stage 3, GFR 30-59 ml/min 12/25/2017    Alexander Patel has an Estimated Glumerular Filtration Rate (EGFR) between 30 and 59 consistent with the definition of chronic kidney disease stage 3.  eGFR if non   Date Value Ref Range Status  04/23/2018 53.8 (A) >60 mL/min/1.73 m^2 Final    Comment:    Calculation used to obtain the estimated glomerular filtration rate (eGFR) is the CKD-EPI equation.       Lab Results  Component     Colon polyp 10/08/2015    Diverticulosis of large intestine without hemorrhage 10/08/2015    Essential hypertension 08/28/2015    Gout 2012    Hyperlipidemia LDL goal < 130     Mild obstructive sleep apnea 02/16/2023    Orthostatic hypotension 07/14/2020    Osteoporosis 02/27/2025    PAF (paroxysmal atrial fibrillation) 12/30/2020    He had an episode of afib in the past and he has not required anticoagulation and is not needing rate control.  He has not had stroke symptoms.    Preop cardiovascular exam 04/08/2025    Second degree heart block 08/23/2019    Tubular adenoma of colon 02/25/2021    Tubular adenoma of colon  04/29/2025     Past Surgical History:   Procedure Laterality Date    A-V CARDIAC PACEMAKER INSERTION N/A 8/23/2019    Procedure: INSERTION, CARDIAC PACEMAKER, DUAL CHAMBER;  Surgeon: Manas Meade MD;  Location: [facility] CATH;  Service: Cardiology;  Laterality: N/A;    COLONOSCOPY N/A 10/8/2015    Procedure: COLONOSCOPY;  Surgeon: Avinash Lamb MD;  Location: [facility] ENDO;  Service: Endoscopy;  Laterality: N/A;    COLONOSCOPY N/A 2/25/2021    Procedure: COLONOSCOPY;  Surgeon: Avinash Lamb MD;  Location: [facility] ENDO;  Service: Endoscopy;  Laterality: N/A;    FRACTURE SURGERY      hand     Family History   Problem Relation Name Age of Onset    Heart disease Mother      Lymphoma Mother      Colon polyps Mother      Alcohol abuse Father      Emphysema Father      Lymphoma Brother       Alcohol use:  reports current alcohol use.   (Of note, 0.6 oz = 1 beer or 6 oz = 10 beers).  Tobacco use:  reports that he has never smoked. He has quit using smokeless tobacco.  His smokeless tobacco use included chew.  Street drug use:  reports no history of drug use.  Allergies: No known drug allergies.    Home Medications:   Current Medications[1]    Physical Examination:  BP (!) 142/82 (BP Location: Right arm, Patient Position: Sitting)   Resp 16   Wt 65.4 kg (144 lb 2.9 oz)   BMI 23.99 kg/m²     Neurological exam:  Mental status: Awake and alert. MMSE: 24/30    UPDRS Motor Examination      Speech  2 - Monotone, slurred but understandable; moderately impaired.   Facial Expression  Mild masked facies.  Decreased blink frequency and fewer movements around the mouth.  (2)   Rigidity     Neck 0 - Absent.   Upper Extremity: Right 0 - Absent.   Upper Extremity: Left 1 - Slight or detectable only when activated by mirror or other movements.   Lower Extremity: Right 0 - Absent.   Lower Extremity: Left 0 - Absent.     Finger Taps      right 2 - Moderately impaired. Definite and early fatiguing. May have occasional arrests in  movement.   left 2 - Moderately impaired. Definite and early fatiguing. May have occasional arrests in movement.   Hand Movements      right 1 - Mild slowing and/or reduction in amplitude.   left 1 - Mild slowing and/or reduction in amplitude.   Pronation/supination of Hands      right 1 - Mild slowing and/or reduction in amplitude.   left 1 - Mild slowing and/or reduction in amplitude.     Toe Tapping    right 0 - Normal.   left 0 - Normal.     Leg Agility      right 0 - Normal.   left 0 - Normal.   Arising from Chair  0 - Normal.   Posture  2 - Moderately stooped posture, definitely abnormal; can be slightly leaning ot one side.    Gait  1 - Walks slowly, may shuffle with short steps, but no festination (hastening steps) or propulsion, reduced L arm wing   Freezing of gait With turning    Postural Stability (Response to sudden, strong posterior displacement produced by pull on shoulders while patient erect with eyes open and feet slightly apart.   0 - Normal.   Body Bradykinesia and Hypokinesia (Combining slowness, hesitancy, decreased armswing, small amplitude, and poverty of movement in general)  1 - Minimal slowness, giving movement a deliberate character; could be normal for some persons. Possibly reduced amplitude.     Tremor at Rest:      Face, lips, chin 0 - Absent.    Hands:      right 0 - Absent.    left 0 - Absent.    Feet:     right 0 - Absent.    left 0 - Absent.    Constancy of REST tremor: none   Postural tremor:    right 0 - Absent.    left 0 - Absent.    Kinetic tremor:    right 0 - Absent.    left 0 - Absent.    Dyskinesias present? No                   Diagnostic Data Reviewed:   I have personally reviewed provider notes, labs and imaging made available to me today.     Imaging:  MRI Brain W WO Contrast 6/2025:    Narrative  EXAM:  MRI BRAIN W WO CONTRAST    TECHNIQUE: Multiplanar multisequence MRI of the brain without and with administration of gadolinium based intravenous  contrast.    INDICATION:  Memory loss; [R41.3]-Other amnesia.    COMPARISON:  None.    FINDINGS:  Mild nonspecific periventricular and subcortical white matter FLAIR hyperintensities, most compatible with the sequelae of chronic microvascular ischemic disease.  Mild parenchymal volume loss and associated ex vacuo prominence of the ventricular system.  Medial temporal lobe atrophy score 1 on the left and grade 2 on the right.    No restricted diffusion. No evidence of intraparenchymal hemorrhage. Maintained gray-white matter differentiation. No mass, mass effect, or midline shift. Preserved central vascular flow voids.  Tortuous left vertebral artery deforms the left medulla. No pathologic contrast enhancement. Normal configuration of the midline sagittal structures, including the pituitary gland/infundibulum, brainstem, cerebellar tonsils, and corpus callosum.    Normal orbits. Mucosal thickening in modeled debris in the left sphenoid sinus.  Mild ethmoid mucosal thickening.  No other significant signal abnormality in the visualized paranasal sinuses or mastoid air cells.  No skull or scalp abnormality.    Impression  1.    Mild parenchymal volume loss and nonspecific white matter signal abnormalities, most compatible with the sequelae of mild chronic microvascular ischemia.  2.    Left sphenoid sinus disease.    NP Testing 03/2024:   Mr. Patel and family reported two years of cognitive changes, with a history of low B12, vascular risk factors, and mild bradykinesia, stooped posture, and intermittent shuffling gait on physical exam. He also has fluctuating cognition, autonomic symptoms, and possible REM sleep behaviors. They denied neuropsychiatric symptoms. He is independent in basic and instrumental activities of daily living but drives less now.      Neuropsychological assessment results were interpreted in the context of average premorbid abilities. Brief mental status screening was reduced. Performance on  another brief cognitive measure was intact overall, with the exception of reduced attention. Processing speed was reduced across tasks, with reduced set shifting on measures of executive functioning. He demonstrated intact performance overall on a measure of abstract reasoning, verbal fluency, and motor programming, with reduced verbal fluency. Phonemic fluency was reduced, with intact semantic fluency across tasks. Naming was intact across tasks, with low average and below expected word reading. Visual perception was reduced, with intact drawing of a moderately complex figure and very complex figure. Learning of a word list was broadly intact, with intact retention and recognition. Story learning, recall, and recognition were intact. Visual recall and recognition were intact. He demonstrated reduced fine motor speed and dexterity bilaterally. He did not endorse significant anxiety on or depression on self-report measures but appeared anxious during testing.     In sum, Mr. Patel primarily exhibited difficulty with speed, including motor speed, processing speed, and phonemic fluency. Other testing was largely intact, with relatively reduced visual perception. Overall, report of cognitive change without resultant change in activities of daily living suggests a diagnosis of mild neurocognitive disorder. Symptoms suggest a possible emerging neurodegenerative condition, with Parkinson's or dementia with Lewy bodies in the differential, in addition to contribution from vascular risk factors. Continued monitoring over time is recommended.       Labs:  Lab Results   Component Value Date    WBC 5.65 05/09/2025    HGB 11.0 (L) 05/09/2025    HCT 33.7 (L) 05/09/2025     05/09/2025    MCV 95 05/09/2025    RDW 13.1 05/09/2025     Lab Results   Component Value Date     05/09/2025    K 4.8 05/09/2025     05/09/2025    CO2 27 05/09/2025    BUN 26 (H) 05/09/2025    CREATININE 1.3 06/16/2025    GLU 96 05/09/2025     "CALCIUM 9.2 05/09/2025    MG 2.1 07/02/2020    PHOS 3.8 10/16/2024     Lab Results   Component Value Date    PROT 6.9 05/09/2025    PROT 6.5 05/09/2025    ALBUMIN 4.0 05/09/2025    BILITOT 0.5 05/09/2025    AST 23 05/09/2025    ALKPHOS 54 05/09/2025    ALT 14 05/09/2025     No results found for: "INR", "PROTIME", "PTT"  Lab Results   Component Value Date    CHOL 133 10/16/2024    HDL 49 10/16/2024    LDLCALC 62.0 (L) 10/16/2024    TRIG 110 10/16/2024    CHOLHDL 36.8 10/16/2024     Lab Results   Component Value Date    HGBA1C 5.4 05/23/2014      Lab Results   Component Value Date    WOONURLN75 836 05/09/2025     Lab Results   Component Value Date    FOLATE 13.9 05/09/2025     Lab Results   Component Value Date    TSH 0.994 05/09/2025     No results found for: "VITAMINB1"  No results found for: "RPR"  Lab Results   Component Value Date    TYRA Negative 05/11/2012     No components found for: "HEPATITISCANTIBODY"  No components found for: "HIV 1/2 AG/AB"  No results found for: "CRP"  No components found for: "SEDIMENTATIONRATE"      Assessment and Plan:  Alexander Patel is a 80 y.o. male here for follow up of MCI.     Problem List Items Addressed This Visit       Mild neurocognitive disorder    Overview   MMSE:     Sept 2023: 25/30 July 2025: 24/30    NP testing March 2024: MCI, with possible emerging neurodegenerative          Current Assessment & Plan   79 y/o with cognitive decline in processing speed   -functional decline with instrumental ADLs, particularly managing finances    -PD features on exam as noted above   -NP testing with concern for PD vs DLB,   -will obtain Synone Biopsy for clarification of diagnosis   -May consider trial of sinemet pending biopsy, hesitant of aricept with Afib   -Discussed role of big and Loud therapy           Other Visit Diagnoses         Parkinsonism, unspecified Parkinsonism type    -  Primary          Important to note, also  has a past medical history of Acute idiopathic gout " of multiple sites (02/23/2022), Atherosclerosis of aorta (04/13/2021), BPH (benign prostatic hyperplasia), Bradycardia (10/08/2015), Cardiac pacemaker in situ (04/13/2021), Chronic anemia (6/20/2025), CKD (chronic kidney disease) stage 3, GFR 30-59 ml/min (12/25/2017), Colon polyp (10/08/2015), Diverticulosis of large intestine without hemorrhage (10/08/2015), Essential hypertension (08/28/2015), Gout (2012), Hyperlipidemia LDL goal < 130, Mild obstructive sleep apnea (02/16/2023), Orthostatic hypotension (07/14/2020), Osteoporosis (02/27/2025), PAF (paroxysmal atrial fibrillation) (12/30/2020), Preop cardiovascular exam (04/08/2025), Second degree heart block (08/23/2019), Tubular adenoma of colon (02/25/2021), and Tubular adenoma of colon (04/29/2025).      The patient will return to clinic following biopsy.     Thank you very much for the opportunity to assist in this patient's care.    If you have any questions or concerns, please do not hesitate to contact me at any time.    Sincerely,       SAAD Guzman  [facility] [place]     Cognition and function were assessed and the patient's functional assessment staging test (FAST) score is 4. Patient is felt to have decision making capacity. PHQ-2 score was 0. Medications were reconciled and reviewed for high-risk medications. The patient's behavior and psychiatric health were reviewed and addressed. The patient and family were counseled on safety in the home and operation of vehicles. Discussed caregiver needs and social support. Advance Care Plan was reviewed. Written care plan and support information provided to the patient or caregiver and information was provided.           [1]   Current Outpatient Medications:     acetaminophen (TYLENOL) 650 MG TbSR, Take 1-2 tablets (650-1,300 mg total) by mouth every 8 (eight) hours., Disp: , Rfl:     alendronate (FOSAMAX) 70 MG tablet, Take 1 tablet (70 mg total) by mouth every 7 days., Disp: 12  tablet, Rfl: 3    allopurinoL (ZYLOPRIM) 100 MG tablet, TAKE 1&1/2 TABLETS BY MOUTH ONCE DAILY., Disp: 135 tablet, Rfl: 2    amLODIPine (NORVASC) 2.5 MG tablet, Take 1 tablet (2.5 mg total) by mouth once daily., Disp: 90 tablet, Rfl: 0    apixaban (ELIQUIS) 5 mg Tab, Take 1 tablet (5 mg total) by mouth 2 (two) times daily., Disp: 60 tablet, Rfl: 11    cyanocobalamin (VITAMIN B-12) 1000 MCG tablet, Take 1 tablet (1,000 mcg total) by mouth once daily., Disp: , Rfl:     GLUCOSAMINE HCL/CHONDR LAM A NA (OSTEO BI-FLEX ORAL), Take 2 tablets by mouth once daily. , Disp: , Rfl:     multivit-min/FA/lycopen/lutein (CENTRUM SILVER MEN ORAL), Take 1 tablet by mouth once daily., Disp: , Rfl:     rosuvastatin (CRESTOR) 10 MG tablet, TAKE 1 TABLET BY MOUTH EVERY DAY, Disp: 90 tablet, Rfl: 2    silodosin (RAPAFLO) 4 mg Cap capsule, Take 1 capsule (4 mg total) by mouth once daily., Disp: 90 capsule, Rfl: 3

## 2025-07-25 NOTE — ASSESSMENT & PLAN NOTE
79 y/o with cognitive decline in processing speed    -PD features on exam as noted above   -NP testing with concern for PD vs DLB,   -will obtain Synone Biopsy for clarification of diagnosis   -May consider trial of sinemet pending biopsy, hesitant of aricept with Afib   -Discussed role of big and Loud therapy

## 2025-08-04 PROBLEM — G89.29 CHRONIC RIGHT-SIDED LOW BACK PAIN WITHOUT SCIATICA: Status: RESOLVED | Noted: 2025-06-20 | Resolved: 2025-08-04

## 2025-08-04 PROBLEM — M54.50 CHRONIC RIGHT-SIDED LOW BACK PAIN WITHOUT SCIATICA: Status: RESOLVED | Noted: 2025-06-20 | Resolved: 2025-08-04

## 2025-08-06 ENCOUNTER — TELEPHONE (OUTPATIENT)
Dept: CARDIOLOGY | Facility: HOSPITAL | Age: 80
End: 2025-08-06
Payer: MEDICARE

## 2025-08-06 NOTE — TELEPHONE ENCOUNTER
Called and notified spouse Mr. Patel's remote monitor is disconnected. He checked his monitor and it was blinking. Instructed to power off and back on. Monitor now says ok. Notified her I would check to make sure it transmitted tomorrow and if it does not, I will call them.

## 2025-08-08 NOTE — TELEPHONE ENCOUNTER
Called to f/u. Spoke to wife she states she spoke to [employer] and they are going to send her a new monitor and it can take up to 7-10 days and then give it 3 days and contact to make sure transmitting. She said, she will f/u in approx. 2 weeks.

## 2025-08-15 ENCOUNTER — HOSPITAL ENCOUNTER (OUTPATIENT)
Dept: CARDIOLOGY | Facility: HOSPITAL | Age: 80
Discharge: HOME | End: 2025-08-15
Attending: INTERNAL MEDICINE
Payer: MEDICARE

## 2025-08-15 ENCOUNTER — CLINICAL SUPPORT (OUTPATIENT)
Dept: CARDIOLOGY | Facility: HOSPITAL | Age: 80
End: 2025-08-15
Payer: MEDICARE

## 2025-08-15 DIAGNOSIS — I44.1 ATRIOVENTRICULAR BLOCK, SECOND DEGREE: ICD-10-CM

## 2025-08-15 PROCEDURE — 93294 REM INTERROG EVL PM/LDLS PM: CPT | Mod: ,,, | Performed by: INTERNAL MEDICINE

## 2025-08-15 PROCEDURE — 93296 REM INTERROG EVL PM/IDS: CPT | Mod: PO | Performed by: INTERNAL MEDICINE

## 2025-08-26 ENCOUNTER — TELEPHONE (OUTPATIENT)
Dept: HEMATOLOGY/ONCOLOGY | Facility: CLINIC | Age: 80
End: 2025-08-26
Payer: MEDICARE

## 2025-08-27 ENCOUNTER — OFFICE VISIT (OUTPATIENT)
Dept: HEMATOLOGY/ONCOLOGY | Facility: CLINIC | Age: 80
End: 2025-08-27
Payer: MEDICARE

## 2025-08-27 ENCOUNTER — LAB VISIT (OUTPATIENT)
Dept: LAB | Facility: HOSPITAL | Age: 80
End: 2025-08-27
Attending: INTERNAL MEDICINE
Payer: MEDICARE

## 2025-08-27 VITALS
DIASTOLIC BLOOD PRESSURE: 83 MMHG | BODY MASS INDEX: 24.69 KG/M2 | OXYGEN SATURATION: 98 % | HEIGHT: 64 IN | WEIGHT: 144.63 LBS | TEMPERATURE: 97 F | SYSTOLIC BLOOD PRESSURE: 164 MMHG | RESPIRATION RATE: 16 BRPM | HEART RATE: 69 BPM

## 2025-08-27 DIAGNOSIS — D64.9 ANEMIA, UNSPECIFIED TYPE: ICD-10-CM

## 2025-08-27 DIAGNOSIS — D47.2 MONOCLONAL PARAPROTEINEMIA: Primary | ICD-10-CM

## 2025-08-27 DIAGNOSIS — D47.2 MONOCLONAL PARAPROTEINEMIA: ICD-10-CM

## 2025-08-27 LAB
ABSOLUTE EOSINOPHIL (OHS): 0.08 K/UL
ABSOLUTE MONOCYTE (OHS): 0.55 K/UL (ref 0.3–1)
ABSOLUTE NEUTROPHIL COUNT (OHS): 3.32 K/UL (ref 1.8–7.7)
ALBUMIN SERPL BCP-MCNC: 4.4 G/DL (ref 3.5–5.2)
ALP SERPL-CCNC: 56 UNIT/L (ref 40–150)
ALT SERPL W/O P-5'-P-CCNC: 20 UNIT/L (ref 10–44)
ANION GAP (OHS): 9 MMOL/L (ref 8–16)
AST SERPL-CCNC: 24 UNIT/L (ref 11–45)
BASOPHILS # BLD AUTO: 0.05 K/UL
BASOPHILS NFR BLD AUTO: 0.9 %
BILIRUB SERPL-MCNC: 0.7 MG/DL (ref 0.1–1)
BILIRUB UR QL STRIP.AUTO: NEGATIVE
BUN SERPL-MCNC: 25 MG/DL (ref 8–23)
CALCIUM SERPL-MCNC: 9.3 MG/DL (ref 8.7–10.5)
CHLORIDE SERPL-SCNC: 108 MMOL/L (ref 95–110)
CLARITY UR: CLEAR
CO2 SERPL-SCNC: 23 MMOL/L (ref 23–29)
COLOR UR AUTO: YELLOW
CREAT SERPL-MCNC: 1.5 MG/DL (ref 0.5–1.4)
DAT IGG-SP REAG RBC-IMP: ABNORMAL
ERYTHROCYTE [DISTWIDTH] IN BLOOD BY AUTOMATED COUNT: 13 % (ref 11.5–14.5)
GFR SERPLBLD CREATININE-BSD FMLA CKD-EPI: 46 ML/MIN/1.73/M2
GLUCOSE SERPL-MCNC: 98 MG/DL (ref 70–110)
GLUCOSE UR QL STRIP: NEGATIVE
HCT VFR BLD AUTO: 33.5 % (ref 40–54)
HGB BLD-MCNC: 11.4 GM/DL (ref 14–18)
HGB UR QL STRIP: NEGATIVE
IMM GRANULOCYTES # BLD AUTO: 0.03 K/UL (ref 0–0.04)
IMM GRANULOCYTES NFR BLD AUTO: 0.5 % (ref 0–0.5)
KETONES UR QL STRIP: NEGATIVE
LDH SERPL-CCNC: 218 U/L (ref 110–260)
LEUKOCYTE ESTERASE UR QL STRIP: NEGATIVE
LYMPHOCYTES # BLD AUTO: 1.48 K/UL (ref 1–4.8)
MCH RBC QN AUTO: 31.1 PG (ref 27–31)
MCHC RBC AUTO-ENTMCNC: 34 G/DL (ref 32–36)
MCV RBC AUTO: 92 FL (ref 82–98)
NITRITE UR QL STRIP: NEGATIVE
NUCLEATED RBC (/100WBC) (OHS): 0 /100 WBC
PH UR STRIP: 6 [PH]
PLATELET # BLD AUTO: 195 K/UL (ref 150–450)
PMV BLD AUTO: 9.9 FL (ref 9.2–12.9)
POTASSIUM SERPL-SCNC: 5 MMOL/L (ref 3.5–5.1)
PROT SERPL-MCNC: 7.4 GM/DL (ref 6–8.4)
PROT UR QL STRIP: NEGATIVE
RBC # BLD AUTO: 3.66 M/UL (ref 4.6–6.2)
RELATIVE EOSINOPHIL (OHS): 1.5 %
RELATIVE LYMPHOCYTE (OHS): 26.9 % (ref 18–48)
RELATIVE MONOCYTE (OHS): 10 % (ref 4–15)
RELATIVE NEUTROPHIL (OHS): 60.2 % (ref 38–73)
SODIUM SERPL-SCNC: 140 MMOL/L (ref 136–145)
SP GR UR STRIP: <=1.005
T4 FREE SERPL-MCNC: 0.94 NG/DL (ref 0.71–1.51)
TESTOST SERPL-MCNC: 299 NG/DL (ref 304–1227)
TSH SERPL-ACNC: 0.64 UIU/ML (ref 0.4–4)
WBC # BLD AUTO: 5.51 K/UL (ref 3.9–12.7)

## 2025-08-27 PROCEDURE — 82040 ASSAY OF SERUM ALBUMIN: CPT | Mod: PN

## 2025-08-27 PROCEDURE — 1101F PT FALLS ASSESS-DOCD LE1/YR: CPT | Mod: CPTII,S$GLB,, | Performed by: INTERNAL MEDICINE

## 2025-08-27 PROCEDURE — 3288F FALL RISK ASSESSMENT DOCD: CPT | Mod: CPTII,S$GLB,, | Performed by: INTERNAL MEDICINE

## 2025-08-27 PROCEDURE — 1159F MED LIST DOCD IN RCRD: CPT | Mod: CPTII,S$GLB,, | Performed by: INTERNAL MEDICINE

## 2025-08-27 PROCEDURE — 84439 ASSAY OF FREE THYROXINE: CPT

## 2025-08-27 PROCEDURE — 84403 ASSAY OF TOTAL TESTOSTERONE: CPT

## 2025-08-27 PROCEDURE — 1126F AMNT PAIN NOTED NONE PRSNT: CPT | Mod: CPTII,S$GLB,, | Performed by: INTERNAL MEDICINE

## 2025-08-27 PROCEDURE — 3077F SYST BP >= 140 MM HG: CPT | Mod: CPTII,S$GLB,, | Performed by: INTERNAL MEDICINE

## 2025-08-27 PROCEDURE — 85025 COMPLETE CBC W/AUTO DIFF WBC: CPT | Mod: PN

## 2025-08-27 PROCEDURE — 99999 PR PBB SHADOW E&M-EST. PATIENT-LVL III: CPT | Mod: PBBFAC,,, | Performed by: INTERNAL MEDICINE

## 2025-08-27 PROCEDURE — 99205 OFFICE O/P NEW HI 60 MIN: CPT | Mod: S$GLB,,, | Performed by: INTERNAL MEDICINE

## 2025-08-27 PROCEDURE — 81003 URINALYSIS AUTO W/O SCOPE: CPT | Mod: PN

## 2025-08-27 PROCEDURE — 83615 LACTATE (LD) (LDH) ENZYME: CPT | Mod: PN

## 2025-08-27 PROCEDURE — 84443 ASSAY THYROID STIM HORMONE: CPT

## 2025-08-27 PROCEDURE — 36415 COLL VENOUS BLD VENIPUNCTURE: CPT | Mod: PN | Performed by: INTERNAL MEDICINE

## 2025-08-27 PROCEDURE — 36415 COLL VENOUS BLD VENIPUNCTURE: CPT | Mod: PN

## 2025-08-27 PROCEDURE — G2211 COMPLEX E/M VISIT ADD ON: HCPCS | Mod: S$GLB,,, | Performed by: INTERNAL MEDICINE

## 2025-08-27 PROCEDURE — 1157F ADVNC CARE PLAN IN RCRD: CPT | Mod: CPTII,S$GLB,, | Performed by: INTERNAL MEDICINE

## 2025-08-27 PROCEDURE — 3079F DIAST BP 80-89 MM HG: CPT | Mod: CPTII,S$GLB,, | Performed by: INTERNAL MEDICINE

## 2025-09-02 ENCOUNTER — LAB VISIT (OUTPATIENT)
Dept: LAB | Facility: HOSPITAL | Age: 80
End: 2025-09-02
Attending: INTERNAL MEDICINE
Payer: MEDICARE

## 2025-09-02 ENCOUNTER — OFFICE VISIT (OUTPATIENT)
Dept: FAMILY MEDICINE | Facility: CLINIC | Age: 80
End: 2025-09-02
Payer: MEDICARE

## 2025-09-02 VITALS
TEMPERATURE: 98 F | SYSTOLIC BLOOD PRESSURE: 132 MMHG | DIASTOLIC BLOOD PRESSURE: 64 MMHG | HEART RATE: 60 BPM | WEIGHT: 145.81 LBS | OXYGEN SATURATION: 98 % | BODY MASS INDEX: 25.03 KG/M2

## 2025-09-02 DIAGNOSIS — M81.0 OSTEOPOROSIS, UNSPECIFIED OSTEOPOROSIS TYPE, UNSPECIFIED PATHOLOGICAL FRACTURE PRESENCE: Primary | ICD-10-CM

## 2025-09-02 DIAGNOSIS — D47.2 MONOCLONAL PARAPROTEINEMIA: ICD-10-CM

## 2025-09-02 DIAGNOSIS — I10 ESSENTIAL HYPERTENSION: ICD-10-CM

## 2025-09-02 DIAGNOSIS — D64.9 CHRONIC ANEMIA: ICD-10-CM

## 2025-09-02 DIAGNOSIS — R79.89 LOW TESTOSTERONE LEVEL IN MALE: ICD-10-CM

## 2025-09-02 DIAGNOSIS — D64.9 ANEMIA, UNSPECIFIED TYPE: ICD-10-CM

## 2025-09-02 LAB — OHS CV DC REMOTE DEVICE TYPE: NORMAL

## 2025-09-02 PROCEDURE — 1101F PT FALLS ASSESS-DOCD LE1/YR: CPT | Mod: CPTII,S$GLB,, | Performed by: FAMILY MEDICINE

## 2025-09-02 PROCEDURE — 99214 OFFICE O/P EST MOD 30 MIN: CPT | Mod: S$GLB,,, | Performed by: FAMILY MEDICINE

## 2025-09-02 PROCEDURE — G2211 COMPLEX E/M VISIT ADD ON: HCPCS | Mod: S$GLB,,, | Performed by: FAMILY MEDICINE

## 2025-09-02 PROCEDURE — 82272 OCCULT BLD FECES 1-3 TESTS: CPT | Mod: 91

## 2025-09-02 PROCEDURE — 3075F SYST BP GE 130 - 139MM HG: CPT | Mod: CPTII,S$GLB,, | Performed by: FAMILY MEDICINE

## 2025-09-02 PROCEDURE — 1157F ADVNC CARE PLAN IN RCRD: CPT | Mod: CPTII,S$GLB,, | Performed by: FAMILY MEDICINE

## 2025-09-02 PROCEDURE — 3078F DIAST BP <80 MM HG: CPT | Mod: CPTII,S$GLB,, | Performed by: FAMILY MEDICINE

## 2025-09-02 PROCEDURE — 99999 PR PBB SHADOW E&M-EST. PATIENT-LVL IV: CPT | Mod: PBBFAC,,, | Performed by: FAMILY MEDICINE

## 2025-09-02 PROCEDURE — 1126F AMNT PAIN NOTED NONE PRSNT: CPT | Mod: CPTII,S$GLB,, | Performed by: FAMILY MEDICINE

## 2025-09-02 PROCEDURE — 3288F FALL RISK ASSESSMENT DOCD: CPT | Mod: CPTII,S$GLB,, | Performed by: FAMILY MEDICINE

## 2025-09-02 PROCEDURE — 1159F MED LIST DOCD IN RCRD: CPT | Mod: CPTII,S$GLB,, | Performed by: FAMILY MEDICINE

## 2025-09-02 RX ORDER — AMLODIPINE BESYLATE 2.5 MG/1
2.5 TABLET ORAL DAILY
Qty: 90 TABLET | Refills: 3 | Status: SHIPPED | OUTPATIENT
Start: 2025-09-02 | End: 2026-09-02

## 2025-09-02 RX ORDER — ALENDRONATE SODIUM 70 MG/1
70 TABLET ORAL
Qty: 12 TABLET | Refills: 3 | Status: SHIPPED | OUTPATIENT
Start: 2025-09-02 | End: 2026-09-02

## 2025-09-03 LAB
OB PNL STL: NEGATIVE